# Patient Record
Sex: FEMALE | Race: BLACK OR AFRICAN AMERICAN | Employment: OTHER | URBAN - METROPOLITAN AREA
[De-identification: names, ages, dates, MRNs, and addresses within clinical notes are randomized per-mention and may not be internally consistent; named-entity substitution may affect disease eponyms.]

---

## 2017-09-24 PROBLEM — O21.9 NAUSEA AND VOMITING DURING PREGNANCY: Status: ACTIVE | Noted: 2017-09-24

## 2017-09-24 PROBLEM — Z34.90 PREGNANCY: Status: ACTIVE | Noted: 2017-09-24

## 2017-12-11 PROBLEM — Z3A.39 39 WEEKS GESTATION OF PREGNANCY: Status: ACTIVE | Noted: 2017-12-11

## 2017-12-11 PROBLEM — O47.9 IRREGULAR CONTRACTIONS: Status: ACTIVE | Noted: 2017-12-11

## 2018-12-12 ENCOUNTER — HOSPITAL ENCOUNTER (EMERGENCY)
Age: 38
Discharge: HOME | End: 2018-12-12
Payer: COMMERCIAL

## 2018-12-12 VITALS
OXYGEN SATURATION: 99 % | RESPIRATION RATE: 16 BRPM | DIASTOLIC BLOOD PRESSURE: 79 MMHG | HEIGHT: 68 IN | HEART RATE: 73 BPM | WEIGHT: 180 LBS | BODY MASS INDEX: 27.28 KG/M2 | TEMPERATURE: 97.4 F | SYSTOLIC BLOOD PRESSURE: 103 MMHG

## 2018-12-12 DIAGNOSIS — H10.9 CONJUNCTIVITIS OF RIGHT EYE, UNSPECIFIED CONJUNCTIVITIS TYPE: Primary | ICD-10-CM

## 2018-12-12 PROCEDURE — 99282 EMERGENCY DEPT VISIT SF MDM: CPT

## 2018-12-12 RX ORDER — ERYTHROMYCIN 5 MG/G
OINTMENT OPHTHALMIC
Qty: 1 TUBE | Refills: 0 | Status: SHIPPED | OUTPATIENT
Start: 2018-12-12 | End: 2018-12-22

## 2018-12-12 ASSESSMENT — PAIN SCALES - GENERAL: PAINLEVEL_OUTOF10: 8

## 2018-12-12 ASSESSMENT — PAIN DESCRIPTION - ORIENTATION: ORIENTATION: RIGHT

## 2018-12-12 ASSESSMENT — PAIN DESCRIPTION - LOCATION: LOCATION: EYE

## 2018-12-12 ASSESSMENT — PAIN DESCRIPTION - FREQUENCY: FREQUENCY: CONTINUOUS

## 2018-12-12 ASSESSMENT — PAIN DESCRIPTION - PAIN TYPE: TYPE: ACUTE PAIN

## 2018-12-12 NOTE — ED PROVIDER NOTES
medication. Assessment     1. Conjunctivitis of right eye, unspecified conjunctivitis type      Plan   Discharge to home  Patient condition is good    New Medications     Discharge Medication List as of 12/12/2018 11:41 AM      START taking these medications    Details   erythromycin (ROMYCIN) 5 MG/GM ophthalmic ointment Apply thin layer oint to affected eye 4 times daily, Disp-1 Tube, R-0, Print             Electronically signed by Mark Burroughs PA-C   DD: 12/12/18  **This report was transcribed using voice recognition software. Every effort was made to ensure accuracy; however, inadvertent computerized transcription errors may be present.     END OF ED PROVIDER NOTE          Mark Burroughs PA-C  12/13/18 5523

## 2019-03-12 ENCOUNTER — APPOINTMENT (OUTPATIENT)
Dept: CT IMAGING | Age: 39
End: 2019-03-12
Payer: COMMERCIAL

## 2019-03-12 ENCOUNTER — HOSPITAL ENCOUNTER (EMERGENCY)
Age: 39
Discharge: HOME | End: 2019-03-12
Attending: EMERGENCY MEDICINE
Payer: COMMERCIAL

## 2019-03-12 VITALS
RESPIRATION RATE: 14 BRPM | SYSTOLIC BLOOD PRESSURE: 105 MMHG | HEART RATE: 73 BPM | HEIGHT: 67 IN | DIASTOLIC BLOOD PRESSURE: 61 MMHG | TEMPERATURE: 97.9 F | OXYGEN SATURATION: 98 % | WEIGHT: 180 LBS | BODY MASS INDEX: 28.25 KG/M2

## 2019-03-12 DIAGNOSIS — R11.2 NAUSEA AND VOMITING, INTRACTABILITY OF VOMITING NOT SPECIFIED, UNSPECIFIED VOMITING TYPE: Primary | ICD-10-CM

## 2019-03-12 DIAGNOSIS — R10.9 ABDOMINAL PAIN, UNSPECIFIED ABDOMINAL LOCATION: ICD-10-CM

## 2019-03-12 DIAGNOSIS — N39.0 URINARY TRACT INFECTION WITH HEMATURIA, SITE UNSPECIFIED: ICD-10-CM

## 2019-03-12 DIAGNOSIS — R31.9 URINARY TRACT INFECTION WITH HEMATURIA, SITE UNSPECIFIED: ICD-10-CM

## 2019-03-12 LAB
ALBUMIN SERPL-MCNC: 4.6 G/DL (ref 3.5–5.2)
ALP BLD-CCNC: 70 U/L (ref 35–104)
ALT SERPL-CCNC: 16 U/L (ref 0–32)
ANION GAP SERPL CALCULATED.3IONS-SCNC: 16 MMOL/L (ref 7–16)
AST SERPL-CCNC: 20 U/L (ref 0–31)
BACTERIA: ABNORMAL /HPF
BASOPHILS ABSOLUTE: 0.01 E9/L (ref 0–0.2)
BASOPHILS RELATIVE PERCENT: 0.2 % (ref 0–2)
BILIRUB SERPL-MCNC: 0.6 MG/DL (ref 0–1.2)
BILIRUBIN URINE: NEGATIVE
BLOOD, URINE: ABNORMAL
BUN BLDV-MCNC: 7 MG/DL (ref 6–20)
CALCIUM SERPL-MCNC: 8.9 MG/DL (ref 8.6–10.2)
CHLORIDE BLD-SCNC: 97 MMOL/L (ref 98–107)
CLARITY: CLEAR
CO2: 21 MMOL/L (ref 22–29)
COLOR: YELLOW
CREAT SERPL-MCNC: 0.8 MG/DL (ref 0.5–1)
EOSINOPHILS ABSOLUTE: 0.01 E9/L (ref 0.05–0.5)
EOSINOPHILS RELATIVE PERCENT: 0.2 % (ref 0–6)
EPITHELIAL CELLS, UA: ABNORMAL /HPF
GFR AFRICAN AMERICAN: >60
GFR NON-AFRICAN AMERICAN: >60 ML/MIN/1.73
GLUCOSE BLD-MCNC: 102 MG/DL (ref 74–99)
GLUCOSE URINE: NEGATIVE MG/DL
HCG, URINE, POC: NEGATIVE
HCT VFR BLD CALC: 40.3 % (ref 34–48)
HEMOGLOBIN: 13.3 G/DL (ref 11.5–15.5)
IMMATURE GRANULOCYTES #: 0.01 E9/L
IMMATURE GRANULOCYTES %: 0.2 % (ref 0–5)
KETONES, URINE: 15 MG/DL
LACTIC ACID: 2.1 MMOL/L (ref 0.5–2.2)
LEUKOCYTE ESTERASE, URINE: ABNORMAL
LIPASE: 16 U/L (ref 13–60)
LYMPHOCYTES ABSOLUTE: 1.1 E9/L (ref 1.5–4)
LYMPHOCYTES RELATIVE PERCENT: 25.9 % (ref 20–42)
Lab: NORMAL
MAGNESIUM: 2 MG/DL (ref 1.6–2.6)
MCH RBC QN AUTO: 29.6 PG (ref 26–35)
MCHC RBC AUTO-ENTMCNC: 33 % (ref 32–34.5)
MCV RBC AUTO: 89.6 FL (ref 80–99.9)
MONOCYTES ABSOLUTE: 0.34 E9/L (ref 0.1–0.95)
MONOCYTES RELATIVE PERCENT: 8 % (ref 2–12)
NEGATIVE QC PASS/FAIL: NORMAL
NEUTROPHILS ABSOLUTE: 2.77 E9/L (ref 1.8–7.3)
NEUTROPHILS RELATIVE PERCENT: 65.5 % (ref 43–80)
NITRITE, URINE: NEGATIVE
PDW BLD-RTO: 14 FL (ref 11.5–15)
PH UA: 6 (ref 5–9)
PLATELET # BLD: 325 E9/L (ref 130–450)
PMV BLD AUTO: 8.7 FL (ref 7–12)
POSITIVE QC PASS/FAIL: NORMAL
POTASSIUM REFLEX MAGNESIUM: 3 MMOL/L (ref 3.5–5)
PROTEIN UA: ABNORMAL MG/DL
RBC # BLD: 4.5 E12/L (ref 3.5–5.5)
RBC UA: ABNORMAL /HPF (ref 0–2)
SODIUM BLD-SCNC: 134 MMOL/L (ref 132–146)
SPECIFIC GRAVITY UA: 1.02 (ref 1–1.03)
TOTAL PROTEIN: 8.5 G/DL (ref 6.4–8.3)
UROBILINOGEN, URINE: 0.2 E.U./DL
WBC # BLD: 4.2 E9/L (ref 4.5–11.5)
WBC UA: ABNORMAL /HPF (ref 0–5)

## 2019-03-12 PROCEDURE — 83735 ASSAY OF MAGNESIUM: CPT

## 2019-03-12 PROCEDURE — 83690 ASSAY OF LIPASE: CPT

## 2019-03-12 PROCEDURE — 2580000003 HC RX 258: Performed by: NURSE PRACTITIONER

## 2019-03-12 PROCEDURE — 83605 ASSAY OF LACTIC ACID: CPT

## 2019-03-12 PROCEDURE — 87088 URINE BACTERIA CULTURE: CPT

## 2019-03-12 PROCEDURE — 74177 CT ABD & PELVIS W/CONTRAST: CPT

## 2019-03-12 PROCEDURE — 6370000000 HC RX 637 (ALT 250 FOR IP): Performed by: NURSE PRACTITIONER

## 2019-03-12 PROCEDURE — 96374 THER/PROPH/DIAG INJ IV PUSH: CPT

## 2019-03-12 PROCEDURE — 96375 TX/PRO/DX INJ NEW DRUG ADDON: CPT

## 2019-03-12 PROCEDURE — 80053 COMPREHEN METABOLIC PANEL: CPT

## 2019-03-12 PROCEDURE — 85025 COMPLETE CBC W/AUTO DIFF WBC: CPT

## 2019-03-12 PROCEDURE — 6360000002 HC RX W HCPCS: Performed by: NURSE PRACTITIONER

## 2019-03-12 PROCEDURE — 2580000003 HC RX 258: Performed by: RADIOLOGY

## 2019-03-12 PROCEDURE — 81001 URINALYSIS AUTO W/SCOPE: CPT

## 2019-03-12 PROCEDURE — 99284 EMERGENCY DEPT VISIT MOD MDM: CPT

## 2019-03-12 PROCEDURE — 6360000004 HC RX CONTRAST MEDICATION: Performed by: RADIOLOGY

## 2019-03-12 RX ORDER — ONDANSETRON 4 MG/1
4 TABLET, ORALLY DISINTEGRATING ORAL EVERY 8 HOURS PRN
Qty: 10 TABLET | Refills: 0 | Status: SHIPPED | OUTPATIENT
Start: 2019-03-12 | End: 2020-03-11

## 2019-03-12 RX ORDER — KETOROLAC TROMETHAMINE 30 MG/ML
30 INJECTION, SOLUTION INTRAMUSCULAR; INTRAVENOUS ONCE
Status: COMPLETED | OUTPATIENT
Start: 2019-03-12 | End: 2019-03-12

## 2019-03-12 RX ORDER — SODIUM CHLORIDE 0.9 % (FLUSH) 0.9 %
10 SYRINGE (ML) INJECTION PRN
Status: DISCONTINUED | OUTPATIENT
Start: 2019-03-12 | End: 2019-03-12 | Stop reason: HOSPADM

## 2019-03-12 RX ORDER — ONDANSETRON 2 MG/ML
4 INJECTION INTRAMUSCULAR; INTRAVENOUS ONCE
Status: COMPLETED | OUTPATIENT
Start: 2019-03-12 | End: 2019-03-12

## 2019-03-12 RX ORDER — CEFDINIR 300 MG/1
300 CAPSULE ORAL 2 TIMES DAILY
Qty: 14 CAPSULE | Refills: 0 | Status: SHIPPED | OUTPATIENT
Start: 2019-03-12 | End: 2019-03-19

## 2019-03-12 RX ORDER — POTASSIUM CHLORIDE 20 MEQ/1
40 TABLET, EXTENDED RELEASE ORAL ONCE
Status: COMPLETED | OUTPATIENT
Start: 2019-03-12 | End: 2019-03-12

## 2019-03-12 RX ORDER — METOCLOPRAMIDE HYDROCHLORIDE 5 MG/ML
10 INJECTION INTRAMUSCULAR; INTRAVENOUS ONCE
Status: COMPLETED | OUTPATIENT
Start: 2019-03-12 | End: 2019-03-12

## 2019-03-12 RX ORDER — 0.9 % SODIUM CHLORIDE 0.9 %
30 INTRAVENOUS SOLUTION INTRAVENOUS ONCE
Status: COMPLETED | OUTPATIENT
Start: 2019-03-12 | End: 2019-03-12

## 2019-03-12 RX ORDER — LOPERAMIDE HYDROCHLORIDE 2 MG/1
2 CAPSULE ORAL 4 TIMES DAILY PRN
Qty: 8 CAPSULE | Refills: 0 | Status: SHIPPED | OUTPATIENT
Start: 2019-03-12 | End: 2019-03-14

## 2019-03-12 RX ADMIN — SODIUM CHLORIDE 2448 ML: 9 INJECTION, SOLUTION INTRAVENOUS at 09:55

## 2019-03-12 RX ADMIN — ONDANSETRON 4 MG: 2 INJECTION INTRAMUSCULAR; INTRAVENOUS at 09:55

## 2019-03-12 RX ADMIN — IOPAMIDOL 110 ML: 755 INJECTION, SOLUTION INTRAVENOUS at 11:08

## 2019-03-12 RX ADMIN — KETOROLAC TROMETHAMINE 30 MG: 30 INJECTION, SOLUTION INTRAMUSCULAR; INTRAVENOUS at 09:55

## 2019-03-12 RX ADMIN — METOCLOPRAMIDE 10 MG: 5 INJECTION, SOLUTION INTRAMUSCULAR; INTRAVENOUS at 10:57

## 2019-03-12 RX ADMIN — Medication 10 ML: at 11:05

## 2019-03-12 RX ADMIN — POTASSIUM CHLORIDE 40 MEQ: 20 TABLET, EXTENDED RELEASE ORAL at 10:57

## 2019-03-12 ASSESSMENT — PAIN SCALES - GENERAL
PAINLEVEL_OUTOF10: 9
PAINLEVEL_OUTOF10: 10

## 2019-03-12 ASSESSMENT — PAIN DESCRIPTION - PAIN TYPE
TYPE: ACUTE PAIN
TYPE: ACUTE PAIN

## 2019-03-12 ASSESSMENT — PAIN DESCRIPTION - FREQUENCY
FREQUENCY: CONTINUOUS
FREQUENCY: CONTINUOUS

## 2019-03-12 ASSESSMENT — PAIN DESCRIPTION - DESCRIPTORS
DESCRIPTORS: SHARP
DESCRIPTORS: SHARP

## 2019-03-12 ASSESSMENT — PAIN DESCRIPTION - ORIENTATION: ORIENTATION: MID

## 2019-03-12 ASSESSMENT — PAIN DESCRIPTION - LOCATION
LOCATION: ABDOMEN
LOCATION: ABDOMEN

## 2019-03-13 LAB — URINE CULTURE, ROUTINE: NORMAL

## 2021-04-08 ENCOUNTER — APPOINTMENT (OUTPATIENT)
Dept: CT IMAGING | Age: 41
End: 2021-04-08
Payer: COMMERCIAL

## 2021-04-08 ENCOUNTER — HOSPITAL ENCOUNTER (EMERGENCY)
Age: 41
Discharge: HOME | End: 2021-04-08
Attending: EMERGENCY MEDICINE
Payer: COMMERCIAL

## 2021-04-08 VITALS
SYSTOLIC BLOOD PRESSURE: 95 MMHG | TEMPERATURE: 98 F | HEIGHT: 67 IN | BODY MASS INDEX: 28.25 KG/M2 | HEART RATE: 80 BPM | DIASTOLIC BLOOD PRESSURE: 65 MMHG | RESPIRATION RATE: 17 BRPM | WEIGHT: 180 LBS | OXYGEN SATURATION: 99 %

## 2021-04-08 DIAGNOSIS — R11.2 NON-INTRACTABLE VOMITING WITH NAUSEA, UNSPECIFIED VOMITING TYPE: ICD-10-CM

## 2021-04-08 DIAGNOSIS — N30.01 ACUTE CYSTITIS WITH HEMATURIA: Primary | ICD-10-CM

## 2021-04-08 LAB
ALBUMIN SERPL-MCNC: 4.5 G/DL (ref 3.5–5.2)
ALP BLD-CCNC: 69 U/L (ref 35–104)
ALT SERPL-CCNC: 17 U/L (ref 0–32)
ANION GAP SERPL CALCULATED.3IONS-SCNC: 14 MMOL/L (ref 7–16)
AST SERPL-CCNC: 16 U/L (ref 0–31)
BACTERIA: ABNORMAL /HPF
BILIRUB SERPL-MCNC: 0.7 MG/DL (ref 0–1.2)
BILIRUBIN URINE: NEGATIVE
BLOOD, URINE: ABNORMAL
BUN BLDV-MCNC: 10 MG/DL (ref 6–20)
CALCIUM SERPL-MCNC: 9 MG/DL (ref 8.6–10.2)
CHLORIDE BLD-SCNC: 99 MMOL/L (ref 98–107)
CLARITY: CLEAR
CO2: 21 MMOL/L (ref 22–29)
COLOR: YELLOW
CREAT SERPL-MCNC: 0.7 MG/DL (ref 0.5–1)
EPITHELIAL CELLS, UA: ABNORMAL /HPF
GFR AFRICAN AMERICAN: >60
GFR NON-AFRICAN AMERICAN: >60 ML/MIN/1.73
GLUCOSE BLD-MCNC: 104 MG/DL (ref 74–99)
GLUCOSE URINE: NEGATIVE MG/DL
HCG(URINE) PREGNANCY TEST: NEGATIVE
HCT VFR BLD CALC: 39.7 % (ref 34–48)
HEMOGLOBIN: 12.9 G/DL (ref 11.5–15.5)
KETONES, URINE: NEGATIVE MG/DL
LACTIC ACID, SEPSIS: 1.5 MMOL/L (ref 0.5–1.9)
LEUKOCYTE ESTERASE, URINE: ABNORMAL
LIPASE: 12 U/L (ref 13–60)
MCH RBC QN AUTO: 29.1 PG (ref 26–35)
MCHC RBC AUTO-ENTMCNC: 32.5 % (ref 32–34.5)
MCV RBC AUTO: 89.4 FL (ref 80–99.9)
NITRITE, URINE: NEGATIVE
PDW BLD-RTO: 14.3 FL (ref 11.5–15)
PH UA: 8 (ref 5–9)
PLATELET # BLD: 336 E9/L (ref 130–450)
PMV BLD AUTO: 9.2 FL (ref 7–12)
POTASSIUM SERPL-SCNC: 3.7 MMOL/L (ref 3.5–5)
PROTEIN UA: NEGATIVE MG/DL
RBC # BLD: 4.44 E12/L (ref 3.5–5.5)
RBC UA: ABNORMAL /HPF (ref 0–2)
SARS-COV-2, NAAT: NOT DETECTED
SODIUM BLD-SCNC: 134 MMOL/L (ref 132–146)
SPECIFIC GRAVITY UA: 1.01 (ref 1–1.03)
TOTAL PROTEIN: 8.2 G/DL (ref 6.4–8.3)
TROPONIN: <0.01 NG/ML (ref 0–0.03)
UROBILINOGEN, URINE: 4 E.U./DL
WBC # BLD: 8.1 E9/L (ref 4.5–11.5)
WBC UA: ABNORMAL /HPF (ref 0–5)

## 2021-04-08 PROCEDURE — 96374 THER/PROPH/DIAG INJ IV PUSH: CPT

## 2021-04-08 PROCEDURE — 83605 ASSAY OF LACTIC ACID: CPT

## 2021-04-08 PROCEDURE — 84484 ASSAY OF TROPONIN QUANT: CPT

## 2021-04-08 PROCEDURE — 93005 ELECTROCARDIOGRAM TRACING: CPT | Performed by: NURSE PRACTITIONER

## 2021-04-08 PROCEDURE — 96375 TX/PRO/DX INJ NEW DRUG ADDON: CPT

## 2021-04-08 PROCEDURE — 81001 URINALYSIS AUTO W/SCOPE: CPT

## 2021-04-08 PROCEDURE — 6360000004 HC RX CONTRAST MEDICATION: Performed by: RADIOLOGY

## 2021-04-08 PROCEDURE — 87635 SARS-COV-2 COVID-19 AMP PRB: CPT

## 2021-04-08 PROCEDURE — 96372 THER/PROPH/DIAG INJ SC/IM: CPT

## 2021-04-08 PROCEDURE — 80053 COMPREHEN METABOLIC PANEL: CPT

## 2021-04-08 PROCEDURE — 6360000002 HC RX W HCPCS: Performed by: NURSE PRACTITIONER

## 2021-04-08 PROCEDURE — 99284 EMERGENCY DEPT VISIT MOD MDM: CPT

## 2021-04-08 PROCEDURE — 81025 URINE PREGNANCY TEST: CPT

## 2021-04-08 PROCEDURE — 85027 COMPLETE CBC AUTOMATED: CPT

## 2021-04-08 PROCEDURE — 2580000003 HC RX 258: Performed by: RADIOLOGY

## 2021-04-08 PROCEDURE — 74177 CT ABD & PELVIS W/CONTRAST: CPT

## 2021-04-08 PROCEDURE — 83690 ASSAY OF LIPASE: CPT

## 2021-04-08 PROCEDURE — 2580000003 HC RX 258: Performed by: NURSE PRACTITIONER

## 2021-04-08 PROCEDURE — 6360000002 HC RX W HCPCS: Performed by: EMERGENCY MEDICINE

## 2021-04-08 RX ORDER — PROMETHAZINE HYDROCHLORIDE 25 MG/ML
12.5 INJECTION, SOLUTION INTRAMUSCULAR; INTRAVENOUS ONCE
Status: COMPLETED | OUTPATIENT
Start: 2021-04-08 | End: 2021-04-08

## 2021-04-08 RX ORDER — CEFDINIR 300 MG/1
300 CAPSULE ORAL 2 TIMES DAILY
Qty: 20 CAPSULE | Refills: 0 | Status: SHIPPED | OUTPATIENT
Start: 2021-04-08 | End: 2021-04-18

## 2021-04-08 RX ORDER — METOCLOPRAMIDE HYDROCHLORIDE 5 MG/ML
10 INJECTION INTRAMUSCULAR; INTRAVENOUS ONCE
Status: COMPLETED | OUTPATIENT
Start: 2021-04-08 | End: 2021-04-08

## 2021-04-08 RX ORDER — ONDANSETRON 4 MG/1
8 TABLET, ORALLY DISINTEGRATING ORAL EVERY 8 HOURS PRN
Qty: 12 TABLET | Refills: 0 | Status: SHIPPED | OUTPATIENT
Start: 2021-04-08 | End: 2022-03-30

## 2021-04-08 RX ORDER — 0.9 % SODIUM CHLORIDE 0.9 %
1000 INTRAVENOUS SOLUTION INTRAVENOUS ONCE
Status: COMPLETED | OUTPATIENT
Start: 2021-04-08 | End: 2021-04-08

## 2021-04-08 RX ORDER — SODIUM CHLORIDE 0.9 % (FLUSH) 0.9 %
10 SYRINGE (ML) INJECTION PRN
Status: DISCONTINUED | OUTPATIENT
Start: 2021-04-08 | End: 2021-04-08 | Stop reason: HOSPADM

## 2021-04-08 RX ORDER — DIPHENHYDRAMINE HYDROCHLORIDE 50 MG/ML
12.5 INJECTION INTRAMUSCULAR; INTRAVENOUS ONCE
Status: COMPLETED | OUTPATIENT
Start: 2021-04-08 | End: 2021-04-08

## 2021-04-08 RX ORDER — FENTANYL CITRATE 50 UG/ML
50 INJECTION, SOLUTION INTRAMUSCULAR; INTRAVENOUS ONCE
Status: COMPLETED | OUTPATIENT
Start: 2021-04-08 | End: 2021-04-08

## 2021-04-08 RX ORDER — ONDANSETRON 2 MG/ML
4 INJECTION INTRAMUSCULAR; INTRAVENOUS ONCE
Status: COMPLETED | OUTPATIENT
Start: 2021-04-08 | End: 2021-04-08

## 2021-04-08 RX ADMIN — FENTANYL CITRATE 50 MCG: 50 INJECTION, SOLUTION INTRAMUSCULAR; INTRAVENOUS at 10:04

## 2021-04-08 RX ADMIN — IOPAMIDOL 90 ML: 755 INJECTION, SOLUTION INTRAVENOUS at 12:32

## 2021-04-08 RX ADMIN — Medication 10 ML: at 12:32

## 2021-04-08 RX ADMIN — ONDANSETRON 4 MG: 2 INJECTION INTRAMUSCULAR; INTRAVENOUS at 10:04

## 2021-04-08 RX ADMIN — PROMETHAZINE HYDROCHLORIDE 12.5 MG: 25 INJECTION INTRAMUSCULAR; INTRAVENOUS at 13:40

## 2021-04-08 RX ADMIN — SODIUM CHLORIDE 1000 ML: 9 INJECTION, SOLUTION INTRAVENOUS at 14:06

## 2021-04-08 RX ADMIN — DIPHENHYDRAMINE HYDROCHLORIDE 12.5 MG: 50 INJECTION, SOLUTION INTRAMUSCULAR; INTRAVENOUS at 11:05

## 2021-04-08 RX ADMIN — SODIUM CHLORIDE 1000 ML: 9 INJECTION, SOLUTION INTRAVENOUS at 10:04

## 2021-04-08 RX ADMIN — METOCLOPRAMIDE HYDROCHLORIDE 10 MG: 5 INJECTION INTRAMUSCULAR; INTRAVENOUS at 11:05

## 2021-04-08 ASSESSMENT — PAIN DESCRIPTION - FREQUENCY
FREQUENCY: CONTINUOUS
FREQUENCY: CONTINUOUS

## 2021-04-08 ASSESSMENT — PAIN DESCRIPTION - DESCRIPTORS: DESCRIPTORS: CONSTANT;STABBING

## 2021-04-08 ASSESSMENT — PAIN DESCRIPTION - PAIN TYPE
TYPE: ACUTE PAIN
TYPE: ACUTE PAIN

## 2021-04-08 ASSESSMENT — PAIN DESCRIPTION - LOCATION
LOCATION: ABDOMEN
LOCATION: ABDOMEN

## 2021-04-08 NOTE — ED PROVIDER NOTES
Total Protein 8.2 6.4 - 8.3 g/dL    Albumin 4.5 3.5 - 5.2 g/dL    Total Bilirubin 0.7 0.0 - 1.2 mg/dL    Alkaline Phosphatase 69 35 - 104 U/L    ALT 17 0 - 32 U/L    AST 16 0 - 31 U/L   Troponin   Result Value Ref Range    Troponin <0.01 0.00 - 0.03 ng/mL   Lactate, Sepsis   Result Value Ref Range    Lactic Acid, Sepsis 1.5 0.5 - 1.9 mmol/L   Lipase   Result Value Ref Range    Lipase 12 (L) 13 - 60 U/L   Urinalysis with Microscopic   Result Value Ref Range    Color, UA Yellow Straw/Yellow    Clarity, UA Clear Clear    Glucose, Ur Negative Negative mg/dL    Bilirubin Urine Negative Negative    Ketones, Urine Negative Negative mg/dL    Specific Gravity, UA 1.010 1.005 - 1.030    Blood, Urine TRACE-INTACT Negative    pH, UA 8.0 5.0 - 9.0    Protein, UA Negative Negative mg/dL    Urobilinogen, Urine 4.0 (A) <2.0 E.U./dL    Nitrite, Urine Negative Negative    Leukocyte Esterase, Urine SMALL (A) Negative    WBC, UA 5-10 (A) 0 - 5 /HPF    RBC, UA 1-3 0 - 2 /HPF    Epithelial Cells, UA RARE /HPF    Bacteria, UA RARE (A) None Seen /HPF   Pregnancy, urine   Result Value Ref Range    HCG(Urine) Pregnancy Test NEGATIVE NEGATIVE   EKG 12 Lead   Result Value Ref Range    Ventricular Rate 90 BPM    Atrial Rate 90 BPM    P-R Interval 136 ms    QRS Duration 82 ms    Q-T Interval 380 ms    QTc Calculation (Bazett) 464 ms    P Axis 58 degrees    R Axis 28 degrees    T Axis 34 degrees   EKG #1:  Interpreted by emergency department physician unless otherwise noted. Time:  1431    Rate: 90 bpm  Rhythm: Sinus rhythm. Interpretation: Normal EKG, normal sinus rhythm. Comparison: There are no previous tracings available for comparison interpreted by me. Imaging: All Radiology results interpreted by Radiologist unless otherwise noted. CT ABDOMEN PELVIS W IV CONTRAST Additional Contrast? None   Final Result   Non-specific slight prominence of fluid throughout multiple small bowel loops   may reflect mild enteritis.       Slight diastasis recti at the umbilicus, unchanged           ED Course / Medical Decision Making     Medications   sodium chloride flush 0.9 % injection 10 mL (10 mLs Intravenous Given 4/8/21 1232)   0.9 % sodium chloride bolus (0 mLs Intravenous Stopped 4/8/21 1104)   ondansetron (ZOFRAN) injection 4 mg (4 mg Intravenous Given 4/8/21 1004)   fentaNYL (SUBLIMAZE) injection 50 mcg (50 mcg Intravenous Given 4/8/21 1004)   metoclopramide (REGLAN) injection 10 mg (10 mg Intravenous Given 4/8/21 1105)   diphenhydrAMINE (BENADRYL) injection 12.5 mg (12.5 mg Intravenous Given 4/8/21 1105)   iopamidol (ISOVUE-370) 76 % injection 90 mL (90 mLs Intravenous Given 4/8/21 1232)   promethazine (PHENERGAN) injection 12.5 mg (12.5 mg Intramuscular Given 4/8/21 1340)   0.9 % sodium chloride bolus (0 mLs Intravenous Stopped 4/8/21 1506)        Re-examination:  4/8/21       Time:   1100 Patients condition unchanged, still vomiting.   1300 Patient still complains of nausea. 1500 Patient reports relief of nausea tolerating p.o. fluids. Consultations:             None    Procedure(s):   none    MDM:   Patient is well-appearing, afebrile. Presents with diffuse abdominal pain as well as nausea vomiting. Labs obtained, reviewed, reassuring with the option of UA positive for leukocytes 5-10 WBCs. CT scan of the abdomen pelvis was obtained indicative of enteritis. Patient was given IV fluids and multiple antiemetics upon reevaluation abdominal pain resolved and patient was able to tolerate p.o. fluids. Plan will be discharged home with antibiotics for cystitis Zofran as needed for nausea. She was advised to follow-up with PCP for reevaluation as well as return precautions. Plan of Care/Counseling:  Myself reviewed today's visit with the patient in addition to providing specific details for the plan of care and counseling regarding the diagnosis and prognosis. Questions are answered at this time and are agreeable with the plan.     Assessment 1. Acute cystitis with hematuria    2. Non-intractable vomiting with nausea, unspecified vomiting type      Plan   Discharge home. Patient condition is good    New Medications     Discharge Medication List as of 4/8/2021  3:16 PM      START taking these medications    Details   ondansetron (ZOFRAN ODT) 4 MG disintegrating tablet Place 2 tablets under the tongue every 8 hours as needed for Nausea, Disp-12 tablet, R-0Print      cefdinir (OMNICEF) 300 MG capsule Take 1 capsule by mouth 2 times daily for 10 days, Disp-20 capsule, R-0Print           Electronically signed by CARYL Patel CNP   DD: 4/8/21  **This report was transcribed using voice recognition software. Every effort was made to ensure accuracy; however, inadvertent computerized transcription errors may be present.   END OF ED PROVIDER NOTE      CARYL Lezama CNP  04/09/21 7026

## 2021-04-08 NOTE — ED TRIAGE NOTES
Radiology Procedure Waiver   Name: Jamison Pride  : 1980  MRN: [id number]    Date:  21    Time: 10:00 AM EDT    Benefits of immediately proceeding with Radiology exam(s) without pre-testing outweigh the risks or are not indicated as specified below and therefore the following is/are being waived:    [x] Pregnancy test   [] Patients LMP on-time and regular. [x] Patient had Tubal Ligation or has other Contraception Device. [] Patient  is Menopausal or Premenarcheal.    [] Patient had Full or Partial Hysterectomy. [] Protocol for Iodine allergy    [] MRI Questionnaire     [] BUN/Creatinine   [] Patient age w/no hx of renal dysfunction. [] Patient on Dialysis. [] Recent Normal Labs.   Electronically signed by CARYL Naik CNP on 21 at 10:00 AM EDT

## 2021-04-09 LAB
EKG ATRIAL RATE: 90 BPM
EKG P AXIS: 58 DEGREES
EKG P-R INTERVAL: 136 MS
EKG Q-T INTERVAL: 380 MS
EKG QRS DURATION: 82 MS
EKG QTC CALCULATION (BAZETT): 464 MS
EKG R AXIS: 28 DEGREES
EKG T AXIS: 34 DEGREES
EKG VENTRICULAR RATE: 90 BPM

## 2022-03-30 ENCOUNTER — HOSPITAL ENCOUNTER (EMERGENCY)
Age: 42
Discharge: HOME | End: 2022-03-31
Attending: EMERGENCY MEDICINE
Payer: MEDICARE

## 2022-03-30 ENCOUNTER — APPOINTMENT (OUTPATIENT)
Dept: ULTRASOUND IMAGING | Age: 42
End: 2022-03-30
Payer: MEDICARE

## 2022-03-30 ENCOUNTER — APPOINTMENT (OUTPATIENT)
Dept: CT IMAGING | Age: 42
End: 2022-03-30
Payer: MEDICARE

## 2022-03-30 ENCOUNTER — APPOINTMENT (OUTPATIENT)
Dept: GENERAL RADIOLOGY | Age: 42
End: 2022-03-30
Payer: MEDICARE

## 2022-03-30 DIAGNOSIS — A59.03 TRICHOMONAL CYSTITIS: ICD-10-CM

## 2022-03-30 DIAGNOSIS — N30.00 ACUTE CYSTITIS WITHOUT HEMATURIA: ICD-10-CM

## 2022-03-30 DIAGNOSIS — M77.9 ENTHESOPATHY: Primary | ICD-10-CM

## 2022-03-30 LAB
ALBUMIN SERPL-MCNC: 4.4 G/DL (ref 3.5–5.2)
ALP BLD-CCNC: 59 U/L (ref 35–104)
ALT SERPL-CCNC: 26 U/L (ref 0–32)
ANION GAP SERPL CALCULATED.3IONS-SCNC: 12 MMOL/L (ref 7–16)
AST SERPL-CCNC: 28 U/L (ref 0–31)
BACTERIA: ABNORMAL /HPF
BASOPHILS ABSOLUTE: 0.02 E9/L (ref 0–0.2)
BASOPHILS RELATIVE PERCENT: 0.4 % (ref 0–2)
BILIRUB SERPL-MCNC: 0.4 MG/DL (ref 0–1.2)
BILIRUBIN URINE: NEGATIVE
BLOOD, URINE: ABNORMAL
BUN BLDV-MCNC: 8 MG/DL (ref 6–20)
CALCIUM SERPL-MCNC: 9.6 MG/DL (ref 8.6–10.2)
CHLORIDE BLD-SCNC: 102 MMOL/L (ref 98–107)
CLARITY: ABNORMAL
CO2: 23 MMOL/L (ref 22–29)
COLOR: YELLOW
CREAT SERPL-MCNC: 0.7 MG/DL (ref 0.5–1)
EOSINOPHILS ABSOLUTE: 0.08 E9/L (ref 0.05–0.5)
EOSINOPHILS RELATIVE PERCENT: 1.5 % (ref 0–6)
EPITHELIAL CELLS, UA: ABNORMAL /HPF
GFR AFRICAN AMERICAN: >60
GFR NON-AFRICAN AMERICAN: >60 ML/MIN/1.73
GLUCOSE BLD-MCNC: 100 MG/DL (ref 74–99)
GLUCOSE URINE: NEGATIVE MG/DL
HCG, URINE, POC: NEGATIVE
HCT VFR BLD CALC: 37.4 % (ref 34–48)
HEMOGLOBIN: 12.2 G/DL (ref 11.5–15.5)
IMMATURE GRANULOCYTES #: 0.01 E9/L
IMMATURE GRANULOCYTES %: 0.2 % (ref 0–5)
INFLUENZA A BY PCR: NOT DETECTED
INFLUENZA B BY PCR: NOT DETECTED
KETONES, URINE: NEGATIVE MG/DL
LEUKOCYTE ESTERASE, URINE: ABNORMAL
LIPASE: 30 U/L (ref 13–60)
LYMPHOCYTES ABSOLUTE: 2.63 E9/L (ref 1.5–4)
LYMPHOCYTES RELATIVE PERCENT: 50.7 % (ref 20–42)
Lab: NORMAL
MCH RBC QN AUTO: 29.3 PG (ref 26–35)
MCHC RBC AUTO-ENTMCNC: 32.6 % (ref 32–34.5)
MCV RBC AUTO: 89.7 FL (ref 80–99.9)
MONOCYTES ABSOLUTE: 0.48 E9/L (ref 0.1–0.95)
MONOCYTES RELATIVE PERCENT: 9.2 % (ref 2–12)
MUCUS: PRESENT /LPF
NEGATIVE QC PASS/FAIL: NORMAL
NEUTROPHILS ABSOLUTE: 1.97 E9/L (ref 1.8–7.3)
NEUTROPHILS RELATIVE PERCENT: 38 % (ref 43–80)
NITRITE, URINE: NEGATIVE
PDW BLD-RTO: 14.2 FL (ref 11.5–15)
PH UA: 5.5 (ref 5–9)
PLATELET # BLD: 389 E9/L (ref 130–450)
PMV BLD AUTO: 9.1 FL (ref 7–12)
POSITIVE QC PASS/FAIL: NORMAL
POTASSIUM REFLEX MAGNESIUM: 3.9 MMOL/L (ref 3.5–5)
PROTEIN UA: NEGATIVE MG/DL
RBC # BLD: 4.17 E12/L (ref 3.5–5.5)
RBC UA: ABNORMAL /HPF (ref 0–2)
SARS-COV-2, NAAT: NOT DETECTED
SODIUM BLD-SCNC: 137 MMOL/L (ref 132–146)
SPECIFIC GRAVITY UA: >=1.03 (ref 1–1.03)
TOTAL PROTEIN: 7.8 G/DL (ref 6.4–8.3)
TRICHOMONAS: PRESENT /HPF
TROPONIN, HIGH SENSITIVITY: <6 NG/L (ref 0–9)
UROBILINOGEN, URINE: 4 E.U./DL
WBC # BLD: 5.2 E9/L (ref 4.5–11.5)
WBC UA: ABNORMAL /HPF (ref 0–5)

## 2022-03-30 PROCEDURE — 73630 X-RAY EXAM OF FOOT: CPT

## 2022-03-30 PROCEDURE — 84484 ASSAY OF TROPONIN QUANT: CPT

## 2022-03-30 PROCEDURE — 71045 X-RAY EXAM CHEST 1 VIEW: CPT

## 2022-03-30 PROCEDURE — 87635 SARS-COV-2 COVID-19 AMP PRB: CPT

## 2022-03-30 PROCEDURE — 83690 ASSAY OF LIPASE: CPT

## 2022-03-30 PROCEDURE — 6360000002 HC RX W HCPCS: Performed by: EMERGENCY MEDICINE

## 2022-03-30 PROCEDURE — 87502 INFLUENZA DNA AMP PROBE: CPT

## 2022-03-30 PROCEDURE — 85025 COMPLETE CBC W/AUTO DIFF WBC: CPT

## 2022-03-30 PROCEDURE — 96375 TX/PRO/DX INJ NEW DRUG ADDON: CPT

## 2022-03-30 PROCEDURE — 81001 URINALYSIS AUTO W/SCOPE: CPT

## 2022-03-30 PROCEDURE — 99284 EMERGENCY DEPT VISIT MOD MDM: CPT

## 2022-03-30 PROCEDURE — 93005 ELECTROCARDIOGRAM TRACING: CPT | Performed by: PHYSICIAN ASSISTANT

## 2022-03-30 PROCEDURE — 96374 THER/PROPH/DIAG INJ IV PUSH: CPT

## 2022-03-30 PROCEDURE — 80053 COMPREHEN METABOLIC PANEL: CPT

## 2022-03-30 PROCEDURE — 93970 EXTREMITY STUDY: CPT

## 2022-03-30 PROCEDURE — 6360000004 HC RX CONTRAST MEDICATION: Performed by: RADIOLOGY

## 2022-03-30 PROCEDURE — 74177 CT ABD & PELVIS W/CONTRAST: CPT

## 2022-03-30 PROCEDURE — 2580000003 HC RX 258: Performed by: EMERGENCY MEDICINE

## 2022-03-30 RX ORDER — CEFDINIR 300 MG/1
300 CAPSULE ORAL ONCE
Status: COMPLETED | OUTPATIENT
Start: 2022-03-31 | End: 2022-03-31

## 2022-03-30 RX ORDER — MORPHINE SULFATE 4 MG/ML
4 INJECTION, SOLUTION INTRAMUSCULAR; INTRAVENOUS ONCE
Status: COMPLETED | OUTPATIENT
Start: 2022-03-30 | End: 2022-03-30

## 2022-03-30 RX ORDER — NAPROXEN 500 MG/1
500 TABLET ORAL 2 TIMES DAILY
Qty: 10 TABLET | Refills: 0 | Status: ON HOLD | OUTPATIENT
Start: 2022-03-30 | End: 2022-04-28 | Stop reason: HOSPADM

## 2022-03-30 RX ORDER — METRONIDAZOLE 500 MG/1
500 TABLET ORAL 2 TIMES DAILY
Qty: 14 TABLET | Refills: 0 | Status: SHIPPED | OUTPATIENT
Start: 2022-03-30 | End: 2022-04-06

## 2022-03-30 RX ORDER — ONDANSETRON 8 MG/1
8 TABLET, ORALLY DISINTEGRATING ORAL EVERY 8 HOURS PRN
Qty: 12 TABLET | Refills: 0 | Status: ON HOLD | OUTPATIENT
Start: 2022-03-30 | End: 2022-04-28 | Stop reason: SDUPTHER

## 2022-03-30 RX ORDER — 0.9 % SODIUM CHLORIDE 0.9 %
1000 INTRAVENOUS SOLUTION INTRAVENOUS ONCE
Status: COMPLETED | OUTPATIENT
Start: 2022-03-30 | End: 2022-03-31

## 2022-03-30 RX ORDER — METRONIDAZOLE 500 MG/1
500 TABLET ORAL ONCE
Status: COMPLETED | OUTPATIENT
Start: 2022-03-31 | End: 2022-03-31

## 2022-03-30 RX ORDER — ONDANSETRON 2 MG/ML
4 INJECTION INTRAMUSCULAR; INTRAVENOUS ONCE
Status: COMPLETED | OUTPATIENT
Start: 2022-03-30 | End: 2022-03-30

## 2022-03-30 RX ORDER — CEFDINIR 300 MG/1
300 CAPSULE ORAL 2 TIMES DAILY
Qty: 14 CAPSULE | Refills: 0 | Status: SHIPPED | OUTPATIENT
Start: 2022-03-30 | End: 2022-04-06

## 2022-03-30 RX ADMIN — MORPHINE SULFATE 4 MG: 4 INJECTION, SOLUTION INTRAMUSCULAR; INTRAVENOUS at 22:56

## 2022-03-30 RX ADMIN — SODIUM CHLORIDE 1000 ML: 9 INJECTION, SOLUTION INTRAVENOUS at 22:55

## 2022-03-30 RX ADMIN — IOPAMIDOL 75 ML: 755 INJECTION, SOLUTION INTRAVENOUS at 22:47

## 2022-03-30 RX ADMIN — ONDANSETRON 4 MG: 2 INJECTION INTRAMUSCULAR; INTRAVENOUS at 22:56

## 2022-03-30 ASSESSMENT — PAIN SCALES - GENERAL: PAINLEVEL_OUTOF10: 8

## 2022-03-30 NOTE — ED TRIAGE NOTES
FIRST PROVIDER CONTACT ASSESSMENT NOTE      Department of Emergency Medicine   Admit Date: No admission date for patient encounter. Chief Complaint: Emesis (x 4 weeks, hot flashes ) and Leg Pain (bilateral)      History of Present Illness: Kentrell North is a 39 y.o. female who presents to the ED for   4 weeks of: Stabbing chest pains off and on  Stabbing abdominal pain off and on  Nausea and vomiting off and on    Patient also complains of aching pains to both of her legs that have been going on for over a year. Patient states she does not have a doctor and has never been evaluated for any of these complaints. She is not on any daily medication.   Did try Tylenol couple times for the leg aches.        -----------------END OF FIRST PROVIDER CONTACT ASSESSMENT NOTE--------------  Electronically signed by ALEJANDRO Morgan   DD: 3/30/22

## 2022-03-31 VITALS
HEIGHT: 71 IN | BODY MASS INDEX: 26.6 KG/M2 | OXYGEN SATURATION: 97 % | WEIGHT: 190 LBS | DIASTOLIC BLOOD PRESSURE: 70 MMHG | SYSTOLIC BLOOD PRESSURE: 132 MMHG | RESPIRATION RATE: 18 BRPM | TEMPERATURE: 97 F | HEART RATE: 36 BPM

## 2022-03-31 LAB
EKG ATRIAL RATE: 90 BPM
EKG P AXIS: 72 DEGREES
EKG P-R INTERVAL: 134 MS
EKG Q-T INTERVAL: 420 MS
EKG QRS DURATION: 82 MS
EKG QTC CALCULATION (BAZETT): 513 MS
EKG R AXIS: 54 DEGREES
EKG T AXIS: 42 DEGREES
EKG VENTRICULAR RATE: 90 BPM

## 2022-03-31 PROCEDURE — 6370000000 HC RX 637 (ALT 250 FOR IP): Performed by: EMERGENCY MEDICINE

## 2022-03-31 PROCEDURE — 93010 ELECTROCARDIOGRAM REPORT: CPT | Performed by: INTERNAL MEDICINE

## 2022-03-31 RX ADMIN — CEFDINIR 300 MG: 300 CAPSULE ORAL at 00:23

## 2022-03-31 RX ADMIN — METRONIDAZOLE 500 MG: 500 TABLET ORAL at 00:23

## 2022-03-31 NOTE — ED PROVIDER NOTES
HPI:  3/30/22,   Time: 9:03 PM EDT       Kentrell North is a 39 y.o. female presenting to the ED for abdominal pain nausea and leg pain, beginning 4 weeks ago. The complaint has been intermittent, moderate in severity, and worsened by nothing. The patient states that she has been having 2 issues over the last several weeks and months. The patient states that over the last 3 months she has been having bilateral lower extremity pain. She states that she will be walking and gets pains that start in her feet and come up into her legs up into her knees. She states that it happens randomly and not at all the times that she is walking. She states that in starting 4 weeks ago she began having nausea as well as hot flashes. She states that she intermittently gets nauseous and then will get really sweaty. She denies any chest pain or shortness of breath. No palpitations. No numbness tingling. No headaches or vision changes. No focal deficits. No fevers. She states she has had one episode of vomiting that was nonbloody and nonbilious. No diarrhea. Normal bowel movements. No urinary symptoms. No back pain. Review of Systems:   Pertinent positives and negatives are stated within HPI, all other systems reviewed and are negative.          --------------------------------------------- PAST HISTORY ---------------------------------------------  Past Medical History:  has a past medical history of Anxiety and depression, Breast disorder, Cancer ([facility] [address] [facility] Depression, Eclampsia, Herpes simplex virus (HSV) infection, Hypothyroid, Mental disorder, Seizures ([facility] and Thyroid disease. Past Surgical History:  has a past surgical history that includes Leg Surgery (Right); Inner ear surgery (Left); Cervix surgery; Breast lumpectomy (Bilateral); and Dilation and curettage of uterus. Social History:  reports that she has quit smoking.  She has never used smokeless tobacco. She reports current alcohol use. She reports that she does not use drugs. Family History: family history is not on file. The patients home medications have been reviewed. Allergies: Patient has no known allergies. ---------------------------------------------------PHYSICAL EXAM--------------------------------------    Constitutional/General: Alert and oriented x3, well appearing, non toxic in NAD  Head: Normocephalic and atraumatic  Eyes: PERRL, EOMI, conjunctive normal, sclera non icteric  Mouth: Oropharynx clear, handling secretions, no trismus, no asymmetry of the posterior oropharynx or uvular edema  Neck: Supple, full ROM, non tender to palpation in the midline, no stridor, no crepitus, no meningeal signs  Respiratory: Lungs clear to auscultation bilaterally, no wheezes, rales, or rhonchi. Not in respiratory distress  Cardiovascular:  Regular rate. Regular rhythm. No murmurs, gallops, or rubs. 2+ distal pulses  GI:  Abdomen Soft, mild tenderness palpation diffusely, Non distended. +BS. No organomegaly, no palpable masses,  No rebound, guarding, or rigidity. Musculoskeletal: Moves all extremities x 4. Warm and well perfused, no clubbing, cyanosis, or edema. Capillary refill <3 seconds. 2+ pulses to bilateral dorsalis pedis and posterior tibialis. No midline thoracic or lumbar tenderness. Paraspinal muscle tenderness in the lower lumbar. Integument: skin warm and dry. No rashes. Neurologic: GCS 15, no focal deficits, symmetric strength 5/5 in the upper and lower extremities bilaterally, sensation intact to light touch bilateral lower extremities and upper extremities and equal in nature. No facial droop. No dysarthria or aphasia. Psychiatric: Normal Affect    -------------------------------------------------- RESULTS -------------------------------------------------  I have personally reviewed all laboratory and imaging results for this patient. Results are listed below.      LABS:  Results for orders placed or performed during the hospital encounter of 03/30/22   COVID-19, Rapid    Specimen: Nasopharyngeal Swab   Result Value Ref Range    SARS-CoV-2, NAAT Not Detected Not Detected   Rapid influenza A/B antigens    Specimen: Nares   Result Value Ref Range    Influenza A by PCR Not Detected Not Detected    Influenza B by PCR Not Detected Not Detected   CBC with Auto Differential   Result Value Ref Range    WBC 5.2 4.5 - 11.5 E9/L    RBC 4.17 3.50 - 5.50 E12/L    Hemoglobin 12.2 11.5 - 15.5 g/dL    Hematocrit 37.4 34.0 - 48.0 %    MCV 89.7 80.0 - 99.9 fL    MCH 29.3 26.0 - 35.0 pg    MCHC 32.6 32.0 - 34.5 %    RDW 14.2 11.5 - 15.0 fL    Platelets 259 174 - 126 E9/L    MPV 9.1 7.0 - 12.0 fL    Neutrophils % 38.0 (L) 43.0 - 80.0 %    Immature Granulocytes % 0.2 0.0 - 5.0 %    Lymphocytes % 50.7 (H) 20.0 - 42.0 %    Monocytes % 9.2 2.0 - 12.0 %    Eosinophils % 1.5 0.0 - 6.0 %    Basophils % 0.4 0.0 - 2.0 %    Neutrophils Absolute 1.97 1.80 - 7.30 E9/L    Immature Granulocytes # 0.01 E9/L    Lymphocytes Absolute 2.63 1.50 - 4.00 E9/L    Monocytes Absolute 0.48 0.10 - 0.95 E9/L    Eosinophils Absolute 0.08 0.05 - 0.50 E9/L    Basophils Absolute 0.02 0.00 - 0.20 E9/L   Comprehensive Metabolic Panel w/ Reflex to MG   Result Value Ref Range    Sodium 137 132 - 146 mmol/L    Potassium reflex Magnesium 3.9 3.5 - 5.0 mmol/L    Chloride 102 98 - 107 mmol/L    CO2 23 22 - 29 mmol/L    Anion Gap 12 7 - 16 mmol/L    Glucose 100 (H) 74 - 99 mg/dL    BUN 8 6 - 20 mg/dL    CREATININE 0.7 0.5 - 1.0 mg/dL    GFR Non-African American >60 >=60 mL/min/1.73    GFR African American >60     Calcium 9.6 8.6 - 10.2 mg/dL    Total Protein 7.8 6.4 - 8.3 g/dL    Albumin 4.4 3.5 - 5.2 g/dL    Total Bilirubin 0.4 0.0 - 1.2 mg/dL    Alkaline Phosphatase 59 35 - 104 U/L    ALT 26 0 - 32 U/L    AST 28 0 - 31 U/L   Lipase   Result Value Ref Range    Lipase 30 13 - 60 U/L   Troponin   Result Value Ref Range    Troponin, High Sensitivity <6 0 - 9 ng/L Urinalysis with Microscopic   Result Value Ref Range    Color, UA Yellow Straw/Yellow    Clarity, UA CLOUDY (A) Clear    Glucose, Ur Negative Negative mg/dL    Bilirubin Urine Negative Negative    Ketones, Urine Negative Negative mg/dL    Specific Gravity, UA >=1.030 1.005 - 1.030    Blood, Urine TRACE-INTACT Negative    pH, UA 5.5 5.0 - 9.0    Protein, UA Negative Negative mg/dL    Urobilinogen, Urine 4.0 (A) <2.0 E.U./dL    Nitrite, Urine Negative Negative    Leukocyte Esterase, Urine SMALL (A) Negative    Mucus, UA Present (A) None Seen /LPF    WBC, UA 5-10 (A) 0 - 5 /HPF    RBC, UA 0-1 0 - 2 /HPF    Epithelial Cells, UA MANY /HPF    Bacteria, UA MODERATE (A) None Seen /HPF    Trichomonas, UA Present (A) None Seen /HPF   POC Pregnancy Urine   Result Value Ref Range    HCG, Urine, POC Negative Negative    Lot Number [id number]     Positive QC Pass/Fail Pass     Negative QC Pass/Fail Pass    EKG 12 Lead   Result Value Ref Range    Ventricular Rate 90 BPM    Atrial Rate 90 BPM    P-R Interval 134 ms    QRS Duration 82 ms    Q-T Interval 420 ms    QTc Calculation (Bazett) 513 ms    P Axis 72 degrees    R Axis 54 degrees    T Axis 42 degrees       RADIOLOGY:  Interpreted by Radiologist.  CT ABDOMEN PELVIS W IV CONTRAST Additional Contrast? None   Final Result   No acute abnormality is seen in the abdomen or the pelvis. RECOMMENDATIONS:   Unavailable         US DUP LOWER EXTREMITIES BILATERAL VENOUS   Final Result   No evidence of DVT in either lower extremity. XR FOOT LEFT (MIN 3 VIEWS)   Final Result   1. Enthesophytes off the medial margins of the distal phalanx of the   bilateral large toes. Left greater than right calcaneal enthesophytes. 2.  Minimal left large toe osteoarthritis.       RECOMMENDATION:   In the setting of trauma, if there is persistent symptoms and physical exam   warrants a repeat radiograph in 10-14 days could be considered as occult   fractures may not be evident on initial imaging evaluation. XR FOOT RIGHT (MIN 3 VIEWS)   Final Result   1. Enthesophytes off the medial margins of the distal phalanx of the   bilateral large toes. Left greater than right calcaneal enthesophytes. 2.  Minimal left large toe osteoarthritis. RECOMMENDATION:   In the setting of trauma, if there is persistent symptoms and physical exam   warrants a repeat radiograph in 10-14 days could be considered as occult   fractures may not be evident on initial imaging evaluation. XR CHEST PORTABLE   Final Result   No acute process. EKG:  This EKG is signed and interpreted by the EP. KG shows normal sinus rhythm at 90 bpm.  Prolonged QT. Nonspecific ST-T wave changes. No STEMI.    ------------------------- NURSING NOTES AND VITALS REVIEWED ---------------------------   The nursing notes within the ED encounter and vital signs as below have been reviewed by myself. /76   Pulse 98   Temp 97 °F (36.1 °C) (Temporal)   Resp 14   Ht 5' 11\" (1.803 m)   Wt 190 lb (86.2 kg)   SpO2 99%   BMI 26.50 kg/m²   Oxygen Saturation Interpretation: Normal    The patients available past medical records and past encounters were reviewed. ------------------------------ ED COURSE/MEDICAL DECISION MAKING----------------------  Medications   0.9 % sodium chloride bolus (1,000 mLs IntraVENous New Bag 3/30/22 2255)   cefdinir (OMNICEF) capsule 300 mg (has no administration in time range)   metroNIDAZOLE (FLAGYL) tablet 500 mg (has no administration in time range)   ondansetron (ZOFRAN) injection 4 mg (4 mg IntraVENous Given 3/30/22 2256)   morphine sulfate (PF) injection 4 mg (4 mg IntraVENous Given 3/30/22 2256)   iopamidol (ISOVUE-370) 76 % injection 75 mL (75 mLs IntraVENous Given 3/30/22 2247)         ED COURSE:       Medical Decision Making: This is a 19-year-old female presented to the ED for nausea, chills and bilateral leg pain.   Patient underwent laboratory work-up which showed a normal CBC. Normal chemistry. Negative troponin. EKG showed nonspecific findings. Covid test and influenza test negative. Urinalysis consistent with a UTI as well as trichomonas. Patient received antibiotics here in the emergency department. Ultrasound lower extremities was negative. Osteophytes noted on foot x-rays. CT abdomen pelvis negative. Patient will be treated with antibiotics. She will be treated conservatively for her bone spurs. Strict return precautions given. Patient agrees to plan. I, Dr. Kojo Noguera, am the primary provider for this encounter    This patient's ED course included: a personal history and physicial examination, re-evaluation prior to disposition, multiple bedside re-evaluations, IV medications, cardiac monitoring and continuous pulse oximetry    This patient has remained hemodynamically stable during their ED course. Re-Evaluations:             Re-evaluation. Patients symptoms are improving      Counseling: The emergency provider has spoken with the patient and discussed todays results, in addition to providing specific details for the plan of care and counseling regarding the diagnosis and prognosis. Questions are answered at this time and they are agreeable with the plan.       --------------------------------- IMPRESSION AND DISPOSITION ---------------------------------    IMPRESSION  1. Enthesopathy    2. Trichomonal cystitis    3. Acute cystitis without hematuria        DISPOSITION  Disposition: Discharge to home  Patient condition is stable    NOTE: This report was transcribed using voice recognition software.  Every effort was made to ensure accuracy; however, inadvertent computerized transcription errors may be present        Tani Solis DO  03/30/22 0146

## 2022-04-26 ENCOUNTER — APPOINTMENT (OUTPATIENT)
Dept: GENERAL RADIOLOGY | Age: 42
End: 2022-04-26
Payer: MEDICARE

## 2022-04-26 ENCOUNTER — HOSPITAL ENCOUNTER (OUTPATIENT)
Age: 42
Setting detail: OBSERVATION
Discharge: HOME | End: 2022-04-29
Attending: EMERGENCY MEDICINE | Admitting: INTERNAL MEDICINE
Payer: MEDICARE

## 2022-04-26 DIAGNOSIS — M25.562 ACUTE PAIN OF LEFT KNEE: Primary | ICD-10-CM

## 2022-04-26 DIAGNOSIS — M19.90 INFLAMMATORY ARTHRITIS: ICD-10-CM

## 2022-04-26 DIAGNOSIS — M25.462 EFFUSION OF LEFT KNEE JOINT: ICD-10-CM

## 2022-04-26 LAB
ALBUMIN SERPL-MCNC: 4.1 G/DL (ref 3.5–5.2)
ALP BLD-CCNC: 59 U/L (ref 35–104)
ALT SERPL-CCNC: 20 U/L (ref 0–32)
AMPHETAMINE SCREEN, URINE: NOT DETECTED
ANION GAP SERPL CALCULATED.3IONS-SCNC: 10 MMOL/L (ref 7–16)
APPEARANCE FLUID: NORMAL
AST SERPL-CCNC: 26 U/L (ref 0–31)
BACTERIA: NORMAL /HPF
BARBITURATE SCREEN URINE: NOT DETECTED
BASOPHILS ABSOLUTE: 0.02 E9/L (ref 0–0.2)
BASOPHILS RELATIVE PERCENT: 0.5 % (ref 0–2)
BENZODIAZEPINE SCREEN, URINE: NOT DETECTED
BILIRUB SERPL-MCNC: 0.5 MG/DL (ref 0–1.2)
BILIRUBIN URINE: NEGATIVE
BLOOD, URINE: NORMAL
BUN BLDV-MCNC: 5 MG/DL (ref 6–20)
CALCIUM SERPL-MCNC: 9.4 MG/DL (ref 8.6–10.2)
CANNABINOID SCREEN URINE: NOT DETECTED
CELL COUNT FLUID TYPE: NORMAL
CHLORIDE BLD-SCNC: 105 MMOL/L (ref 98–107)
CLARITY: CLEAR
CO2: 26 MMOL/L (ref 22–29)
COCAINE METABOLITE SCREEN URINE: NOT DETECTED
COLOR FLUID: YELLOW
COLOR: YELLOW
CREAT SERPL-MCNC: 0.7 MG/DL (ref 0.5–1)
EOSINOPHILS ABSOLUTE: 0.09 E9/L (ref 0.05–0.5)
EOSINOPHILS RELATIVE PERCENT: 2 % (ref 0–6)
FENTANYL SCREEN, URINE: NOT DETECTED
GFR AFRICAN AMERICAN: >60
GFR NON-AFRICAN AMERICAN: >60 ML/MIN/1.73
GLUCOSE BLD-MCNC: 86 MG/DL (ref 74–99)
GLUCOSE URINE: NEGATIVE MG/DL
HCT VFR BLD CALC: 32.5 % (ref 34–48)
HEMOGLOBIN: 10.4 G/DL (ref 11.5–15.5)
IMMATURE GRANULOCYTES #: 0.01 E9/L
IMMATURE GRANULOCYTES %: 0.2 % (ref 0–5)
KETONES, URINE: NEGATIVE MG/DL
LEUKOCYTE ESTERASE, URINE: NEGATIVE
LYMPHOCYTES ABSOLUTE: 2.09 E9/L (ref 1.5–4)
LYMPHOCYTES RELATIVE PERCENT: 47.1 % (ref 20–42)
Lab: ABNORMAL
MCH RBC QN AUTO: 28.9 PG (ref 26–35)
MCHC RBC AUTO-ENTMCNC: 32 % (ref 32–34.5)
MCV RBC AUTO: 90.3 FL (ref 80–99.9)
METHADONE SCREEN, URINE: NOT DETECTED
MONOCYTE, FLUID: 92 %
MONOCYTES ABSOLUTE: 0.44 E9/L (ref 0.1–0.95)
MONOCYTES RELATIVE PERCENT: 9.9 % (ref 2–12)
NEUTROPHIL, FLUID: 8 %
NEUTROPHILS ABSOLUTE: 1.79 E9/L (ref 1.8–7.3)
NEUTROPHILS RELATIVE PERCENT: 40.3 % (ref 43–80)
NITRITE, URINE: NEGATIVE
NUCLEATED CELLS FLUID: 422 /UL
OPIATE SCREEN URINE: POSITIVE
OXYCODONE URINE: NOT DETECTED
PDW BLD-RTO: 14.3 FL (ref 11.5–15)
PH UA: 6 (ref 5–9)
PHENCYCLIDINE SCREEN URINE: NOT DETECTED
PLATELET # BLD: 347 E9/L (ref 130–450)
PMV BLD AUTO: 9.1 FL (ref 7–12)
POTASSIUM SERPL-SCNC: 3.8 MMOL/L (ref 3.5–5)
PROTEIN UA: NEGATIVE MG/DL
RBC # BLD: 3.6 E12/L (ref 3.5–5.5)
RBC FLUID: <2000 /UL
RBC UA: NORMAL /HPF (ref 0–2)
SEDIMENTATION RATE, ERYTHROCYTE: 38 MM/HR (ref 0–20)
SODIUM BLD-SCNC: 141 MMOL/L (ref 132–146)
SPECIFIC GRAVITY UA: 1.02 (ref 1–1.03)
TOTAL PROTEIN: 7.3 G/DL (ref 6.4–8.3)
UROBILINOGEN, URINE: 0.2 E.U./DL
WBC # BLD: 4.4 E9/L (ref 4.5–11.5)
WBC UA: NORMAL /HPF (ref 0–5)

## 2022-04-26 PROCEDURE — 85651 RBC SED RATE NONAUTOMATED: CPT

## 2022-04-26 PROCEDURE — 81001 URINALYSIS AUTO W/SCOPE: CPT

## 2022-04-26 PROCEDURE — 96375 TX/PRO/DX INJ NEW DRUG ADDON: CPT

## 2022-04-26 PROCEDURE — 87070 CULTURE OTHR SPECIMN AEROBIC: CPT

## 2022-04-26 PROCEDURE — 99285 EMERGENCY DEPT VISIT HI MDM: CPT

## 2022-04-26 PROCEDURE — 87491 CHLMYD TRACH DNA AMP PROBE: CPT

## 2022-04-26 PROCEDURE — 89051 BODY FLUID CELL COUNT: CPT

## 2022-04-26 PROCEDURE — 20610 DRAIN/INJ JOINT/BURSA W/O US: CPT

## 2022-04-26 PROCEDURE — 96374 THER/PROPH/DIAG INJ IV PUSH: CPT

## 2022-04-26 PROCEDURE — 89060 EXAM SYNOVIAL FLUID CRYSTALS: CPT

## 2022-04-26 PROCEDURE — 87040 BLOOD CULTURE FOR BACTERIA: CPT

## 2022-04-26 PROCEDURE — 6360000002 HC RX W HCPCS: Performed by: PHYSICIAN ASSISTANT

## 2022-04-26 PROCEDURE — 87205 SMEAR GRAM STAIN: CPT

## 2022-04-26 PROCEDURE — 80307 DRUG TEST PRSMV CHEM ANLYZR: CPT

## 2022-04-26 PROCEDURE — 2500000003 HC RX 250 WO HCPCS: Performed by: PHYSICIAN ASSISTANT

## 2022-04-26 PROCEDURE — 86140 C-REACTIVE PROTEIN: CPT

## 2022-04-26 PROCEDURE — 80053 COMPREHEN METABOLIC PANEL: CPT

## 2022-04-26 PROCEDURE — 36415 COLL VENOUS BLD VENIPUNCTURE: CPT

## 2022-04-26 PROCEDURE — 85025 COMPLETE CBC W/AUTO DIFF WBC: CPT

## 2022-04-26 PROCEDURE — 73562 X-RAY EXAM OF KNEE 3: CPT

## 2022-04-26 PROCEDURE — 87591 N.GONORRHOEAE DNA AMP PROB: CPT

## 2022-04-26 RX ORDER — MORPHINE SULFATE 4 MG/ML
4 INJECTION, SOLUTION INTRAMUSCULAR; INTRAVENOUS ONCE
Status: COMPLETED | OUTPATIENT
Start: 2022-04-26 | End: 2022-04-26

## 2022-04-26 RX ORDER — FENTANYL CITRATE 50 UG/ML
100 INJECTION, SOLUTION INTRAMUSCULAR; INTRAVENOUS ONCE
Status: DISCONTINUED | OUTPATIENT
Start: 2022-04-26 | End: 2022-04-26

## 2022-04-26 RX ORDER — ONDANSETRON 2 MG/ML
4 INJECTION INTRAMUSCULAR; INTRAVENOUS ONCE
Status: COMPLETED | OUTPATIENT
Start: 2022-04-26 | End: 2022-04-26

## 2022-04-26 RX ORDER — LIDOCAINE HYDROCHLORIDE 10 MG/ML
20 INJECTION, SOLUTION INFILTRATION; PERINEURAL ONCE
Status: COMPLETED | OUTPATIENT
Start: 2022-04-26 | End: 2022-04-26

## 2022-04-26 RX ORDER — FENTANYL CITRATE 50 UG/ML
75 INJECTION, SOLUTION INTRAMUSCULAR; INTRAVENOUS ONCE
Status: COMPLETED | OUTPATIENT
Start: 2022-04-26 | End: 2022-04-26

## 2022-04-26 RX ADMIN — FENTANYL CITRATE 75 MCG: 50 INJECTION, SOLUTION INTRAMUSCULAR; INTRAVENOUS at 22:40

## 2022-04-26 RX ADMIN — MORPHINE SULFATE 4 MG: 4 INJECTION, SOLUTION INTRAMUSCULAR; INTRAVENOUS at 20:07

## 2022-04-26 RX ADMIN — ONDANSETRON 4 MG: 2 INJECTION INTRAMUSCULAR; INTRAVENOUS at 20:06

## 2022-04-26 RX ADMIN — LIDOCAINE HYDROCHLORIDE 20 ML: 10 INJECTION, SOLUTION INFILTRATION; PERINEURAL at 22:45

## 2022-04-26 ASSESSMENT — PAIN SCALES - GENERAL
PAINLEVEL_OUTOF10: 9
PAINLEVEL_OUTOF10: 6
PAINLEVEL_OUTOF10: 10

## 2022-04-26 ASSESSMENT — PAIN DESCRIPTION - LOCATION: LOCATION: LEG

## 2022-04-26 ASSESSMENT — PAIN - FUNCTIONAL ASSESSMENT: PAIN_FUNCTIONAL_ASSESSMENT: 0-10

## 2022-04-26 ASSESSMENT — PAIN DESCRIPTION - ORIENTATION: ORIENTATION: LEFT

## 2022-04-26 NOTE — Clinical Note
Zavala Bltato accompanied Deepti Beavers to the emergency department on 4/26/2022. They may return to work on 04/28/2022. If you have any questions or concerns, please don't hesitate to call.       Alexei Valerio PA-C

## 2022-04-26 NOTE — ED PROVIDER NOTES
ED Attending shared visit  CC: Ofelia      [facility]  Department of Emergency Medicine   ED  Encounter Note  Admit Date/RoomTime: 2022  6:24 PM  ED Room:     NAME: Mia Marino  : 1980  MRN: [id number]     Chief Complaint:  Leg Pain (left leg. shooting pain up leg into buttocks)    History of Present Illness       Mia Marino is a 39 y.o. old female who presents to the emergency department by private vehicle, for non-traumatic pain located anterior to left knee  which occured a few day(s) prior to arrival, but she reports she has been having bilateral lower extremity pain on and off for a few months. The complaint is due to no known cause. Since onset the symptoms have been gradually worsening. Patient was in the ED 1 month ago with bilateral foot pain radiating to her knee. She has x-rays at that time which does show some bone spurs. At that time she had reported she was having nausea and chills for about 4 weeks. A full evaluation was undertaken which showed basically normal blood work, urinary tract infection with positive trichomonas. CAT scan of her abdomen was unremarkable at that time. 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 She reports she is still having nausea and chills/hot flashes. She has not tried to take her temperature. .  Her pain is aggraveated by any movement and relieved by splint or immobilization. Her weight bearing ability is: not possible secondary to pain, she can very slightly hop on the unaffected leg, which causes pain in the left leg. She denies any injury. She reports she did follow-up with a foot doctor who told her she needed to wear orthotics but then was released from podiatry care due to insurance issues. She denies history of Gonorrhea or chlamydia. Tetanus Status: unknown.      ROS   Pertinent positives and negatives are stated within HPI, all other systems reviewed and are negative. Past Medical History:  has a past medical history of Anxiety and depression, Breast disorder, Cancer ([address] Cervical cancer ([address] Depression, Eclampsia, Herpes simplex virus (HSV) infection, Hypothyroid, Mental disorder, Seizures ([address] and Thyroid disease. Surgical History:  has a past surgical history that includes Leg Surgery (Right); Inner ear surgery (Left); Cervix surgery; Breast lumpectomy (Bilateral); and Dilation and curettage of uterus. Social History:  reports that she has quit smoking. She has never used smokeless tobacco. She reports current alcohol use. She reports that she does not use drugs. Family History: family history is not on file. Allergies: Patient has no known allergies. Physical Exam   Oxygen Saturation Interpretation: Normal.        ED Triage Vitals [04/26/22 1822]   BP Temp Temp Source Pulse Resp SpO2 Height Weight   111/80 97.7 °F (36.5 °C) Temporal 98 18 99 % 5' 11\" (1.803 m) 190 lb (86.2 kg)         Constitutional:  Alert, development consistent with age. Neck:  Normal ROM. Supple. Left Knee: anterior, suprapatellar            Tenderness:  Severe, mostly over the anterior and superior knee, pain also anterior thigh though less severe. Swelling/Effusion: Mild. Deformity: no deformity observed/palpated. ROM: unable to test due to pain. Skin:  tenderness and warmth. Drawer's:  Unable to Assess. Lachman's: Unable to Assess. Apley's: Unable to Assess. Wade's: Unable to Assess. Valgus/Varus Stress: Unable to Assess. Crepitus: Unable to Assess. Hip:            Tenderness:  none. Swelling: None. Deformity: no.              ROM: diminished range with pain, due to pain in the knee with manipulation of hip, she reports the pain from knee radiated to the buttock. Skin:  no wounds, erythema, or swelling. Joint(s) Above/Below: ankle and foot. Tenderness:  none. Swelling: No.              Deformity: no deformity observed/palpated. ROM: full range of motion. Skin:  no wounds, erythema, or swelling. Neurovascular: Motor deficit: none. Sensory deficit: none. Pulse deficit: none. Capillary refill: normal.  Gait:  unable to be tested at this time. Lymphatics: No lymphangitis or adenopathy noted. Neurological:  Oriented. Motor functions intact.     Lab / Imaging Results   (All laboratory and radiology results have been personally reviewed by myself)  Labs:  Results for orders placed or performed during the hospital encounter of 04/26/22   C.trachomatis N.gonorrhoeae DNA, Urine    Specimen: Urine   Result Value Ref Range    Source Urine    CBC with Auto Differential   Result Value Ref Range    WBC 4.4 (L) 4.5 - 11.5 E9/L    RBC 3.60 3.50 - 5.50 E12/L    Hemoglobin 10.4 (L) 11.5 - 15.5 g/dL    Hematocrit 32.5 (L) 34.0 - 48.0 %    MCV 90.3 80.0 - 99.9 fL    MCH 28.9 26.0 - 35.0 pg    MCHC 32.0 32.0 - 34.5 %    RDW 14.3 11.5 - 15.0 fL    Platelets 144 554 - 586 E9/L    MPV 9.1 7.0 - 12.0 fL    Neutrophils % 40.3 (L) 43.0 - 80.0 %    Immature Granulocytes % 0.2 0.0 - 5.0 %    Lymphocytes % 47.1 (H) 20.0 - 42.0 %    Monocytes % 9.9 2.0 - 12.0 %    Eosinophils % 2.0 0.0 - 6.0 %    Basophils % 0.5 0.0 - 2.0 %    Neutrophils Absolute 1.79 (L) 1.80 - 7.30 E9/L    Immature Granulocytes # 0.01 E9/L    Lymphocytes Absolute 2.09 1.50 - 4.00 E9/L    Monocytes Absolute 0.44 0.10 - 0.95 E9/L    Eosinophils Absolute 0.09 0.05 - 0.50 E9/L    Basophils Absolute 0.02 0.00 - 0.20 E9/L   Sedimentation Rate   Result Value Ref Range    Sed Rate 38 (H) 0 - 20 mm/Hr   Comprehensive Metabolic Panel   Result Value Ref Range    Sodium 141 132 - 146 mmol/L    Potassium 3.8 3.5 - 5.0 mmol/L    Chloride 105 98 - 107 mmol/L    CO2 26 22 - 29 mmol/L Anion Gap 10 7 - 16 mmol/L    Glucose 86 74 - 99 mg/dL    BUN 5 (L) 6 - 20 mg/dL    CREATININE 0.7 0.5 - 1.0 mg/dL    GFR Non-African American >60 >=60 mL/min/1.73    GFR African American >60     Calcium 9.4 8.6 - 10.2 mg/dL    Total Protein 7.3 6.4 - 8.3 g/dL    Albumin 4.1 3.5 - 5.2 g/dL    Total Bilirubin 0.5 0.0 - 1.2 mg/dL    Alkaline Phosphatase 59 35 - 104 U/L    ALT 20 0 - 32 U/L    AST 26 0 - 31 U/L   Urinalysis   Result Value Ref Range    Color, UA Yellow Straw/Yellow    Clarity, UA Clear Clear    Glucose, Ur Negative Negative mg/dL    Bilirubin Urine Negative Negative    Ketones, Urine Negative Negative mg/dL    Specific Gravity, UA 1.020 1.005 - 1.030    Blood, Urine TRACE-INTACT Negative    pH, UA 6.0 5.0 - 9.0    Protein, UA Negative Negative mg/dL    Urobilinogen, Urine 0.2 <2.0 E.U./dL    Nitrite, Urine Negative Negative    Leukocyte Esterase, Urine Negative Negative   Microscopic Urinalysis   Result Value Ref Range    WBC, UA NONE 0 - 5 /HPF    RBC, UA NONE 0 - 2 /HPF    Bacteria, UA NONE SEEN None Seen /HPF   Cell Count with Differential, Body Fluid   Result Value Ref Range    Cell Count Fluid Type Synovial     Color, Fluid Yellow     Appearance, Fluid Hazy     Nucl Cell, Fluid 422 /uL    RBC, Fluid <2,000 /uL    Neutrophil Count, Fluid 8 %    Monocyte Count, Fluid 92 %   URINE DRUG SCREEN   Result Value Ref Range    Amphetamine Screen, Urine NOT DETECTED Negative <1000 ng/mL    Barbiturate Screen, Ur NOT DETECTED Negative < 200 ng/mL    Benzodiazepine Screen, Urine NOT DETECTED Negative < 200 ng/mL    Cannabinoid Scrn, Ur NOT DETECTED Negative < 50ng/mL    Cocaine Metabolite Screen, Urine NOT DETECTED Negative < 300 ng/mL    Opiate Scrn, Ur POSITIVE (A) Negative < 300ng/mL    PCP Screen, Urine NOT DETECTED Negative < 25 ng/mL    Methadone Screen, Urine NOT DETECTED Negative <300 ng/mL    Oxycodone Urine NOT DETECTED Negative <100 ng/mL    FENTANYL SCREEN, URINE NOT DETECTED Negative <1 ng/mL Drug Screen Comment: see below      Imaging: All Radiology results interpreted by Radiologist unless otherwise noted. XR KNEE LEFT (3 VIEWS)   Final Result   Addendum 1 of 1   ADDENDUM:   Correction: Please note that there is suprapatella soft tissue swelling,   compatible with joint effusion. Critical results were called by Dr. Shanta Foley to referring physician on   4/26/2022 at 19:29. Final   No acute abnormality of the knee. Joint effusion. ED Course / Medical Decision Making     Medications   cefTRIAXone (ROCEPHIN) 2 g injection (has no administration in time range)   sterile water injection (has no administration in time range)   0.9 % sodium chloride bolus (has no administration in time range)   morphine sulfate (PF) injection 4 mg (4 mg IntraVENous Given 4/26/22 2007)   ondansetron (ZOFRAN) injection 4 mg (4 mg IntraVENous Given 4/26/22 2006)   lidocaine 1 % injection 20 mL (20 mLs IntraDERmal Given 4/26/22 2245)   fentaNYL (SUBLIMAZE) injection 75 mcg (75 mcg IntraVENous Given 4/26/22 2240)   cefTRIAXone (ROCEPHIN) 2,000 mg in sodium chloride flush 20 mL IV syringe (2,000 mg IntraVENous Given 4/27/22 0049)        Consult(s):   IP CONSULT TO ORTHOPEDIC SURGERY  IP CONSULT TO INFECTIOUS DISEASES    Procedure(s):     PROCEDURE   4/27/22       Time:    ARTHROCENTESIS  Risks, benefits and alternatives (for applicable procedures below) described. Performed By: EM Attending Physician. Dr. Vi Weeks    Indication:  Joint pain, joint swelling and to obtain joint fluid for diagnostic testing. Informed consent: Written consent obtained. The patient was counseled regarding the procedure in person, it's indications, risks, potential complications and alternatives and any questions were answered. Consent was obtained. .  Prep:  The patient was positioned appropriately and the landmarks were identified. The skin was cleansed with povidone iodine and draped in a sterile fashion.   Local Anesthesia:  obtained with Lidocaine 1% without epinephrine. Arthrocentesis:  left knee aspiration was performed using a 18 gauge needle and 20 cc of tenacious, yellow, slightly hazy fluid was drained. Culture sent:  yes. A sterile dressing was then applied to the site. Complications: None. The patient tolerated the procedure with difficulty. MDM:    Patient presents to emergency with severe left knee pain causing immobility of the knee. The symptoms have been developing over the past several days and the pain radiates into her left thigh up into her buttocks. She was here 1 month ago and had bilateral feet pain and found to have bone spurs. At that time she also had a full evaluation due to chills/nausea and was found to have a urinary tract infection with positive trichomonas. She was given cefdinir and Flagyl for home but she did not take the medicines because she reported they made her sleepy. Patient has persisted with the nausea and chills in addition to newly developing left knee pain. Patient's presentation was consistent with septic arthritis and an arthrocentesis was performed. Cultures are pending at this time. Patient was covered with Rocephin 2 g in department. Blood cultures and urine cultures for gonorrhea and chlamydia were also sent. Patient is going to be admitted under observation for follow-up on the synovial fluid analysis and orthopedic consult. Pain control was provided with morphine in department which caused patient to be significantly sedated. Patient reports no history of IV drug abuse or any other type of drug abuse. Urine drug screen was positive for opiates but she had received a dose of morphine prior to collection of urine. Imaging was obtained based on moderate suspicion for joint effusion as per history/physical findings.        Plan of Care/Counseling:  Brooklynn Hatfield PA-C and EM Attending Physician reviewed today's visit with the patient in addition to providing specific details for the plan of care and counseling regarding the diagnosis and prognosis. Questions are answered at this time and are agreeable with the plan. Assessment      1. Acute pain of left knee      Plan   Observation Admission to Telemetry Unit. Patient condition is stable    New Medications     New Prescriptions    No medications on file     Electronically signed by Brooklynn Hatfield PA-C   DD: 4/26/22  **This report was transcribed using voice recognition software. Every effort was made to ensure accuracy; however, inadvertent computerized transcription errors may be present.   END OF ED PROVIDER NOTE       Brooklynn Hatfield PA-C  04/27/22 [id number]

## 2022-04-27 PROBLEM — M19.90 INFLAMMATORY ARTHRITIS: Status: ACTIVE | Noted: 2022-04-27

## 2022-04-27 PROBLEM — M25.462 EFFUSION OF LEFT KNEE JOINT: Status: ACTIVE | Noted: 2022-04-27

## 2022-04-27 PROBLEM — M00.9 SEPTIC ARTHRITIS (HCC): Status: ACTIVE | Noted: 2022-04-27

## 2022-04-27 PROBLEM — M00.9 SEPTIC ARTHRITIS (HCC): Status: RESOLVED | Noted: 2022-04-27 | Resolved: 2022-04-27

## 2022-04-27 LAB
ANION GAP SERPL CALCULATED.3IONS-SCNC: 8 MMOL/L (ref 7–16)
BUN BLDV-MCNC: 6 MG/DL (ref 6–20)
C-REACTIVE PROTEIN: 1.4 MG/DL (ref 0–0.4)
CALCIUM SERPL-MCNC: 9.1 MG/DL (ref 8.6–10.2)
CHLORIDE BLD-SCNC: 103 MMOL/L (ref 98–107)
CO2: 28 MMOL/L (ref 22–29)
CREAT SERPL-MCNC: 0.7 MG/DL (ref 0.5–1)
GFR AFRICAN AMERICAN: >60
GFR NON-AFRICAN AMERICAN: >60 ML/MIN/1.73
GLUCOSE BLD-MCNC: 94 MG/DL (ref 74–99)
GRAM STAIN ORDERABLE: NORMAL
HCT VFR BLD CALC: 32.8 % (ref 34–48)
HEMOGLOBIN: 10.6 G/DL (ref 11.5–15.5)
MCH RBC QN AUTO: 29.2 PG (ref 26–35)
MCHC RBC AUTO-ENTMCNC: 32.3 % (ref 32–34.5)
MCV RBC AUTO: 90.4 FL (ref 80–99.9)
PDW BLD-RTO: 14.4 FL (ref 11.5–15)
PLATELET # BLD: 355 E9/L (ref 130–450)
PMV BLD AUTO: 8.9 FL (ref 7–12)
POTASSIUM SERPL-SCNC: 4.2 MMOL/L (ref 3.5–5)
RBC # BLD: 3.63 E12/L (ref 3.5–5.5)
SODIUM BLD-SCNC: 139 MMOL/L (ref 132–146)
WBC # BLD: 5 E9/L (ref 4.5–11.5)

## 2022-04-27 PROCEDURE — 96361 HYDRATE IV INFUSION ADD-ON: CPT

## 2022-04-27 PROCEDURE — 6360000002 HC RX W HCPCS: Performed by: PHYSICIAN ASSISTANT

## 2022-04-27 PROCEDURE — 96375 TX/PRO/DX INJ NEW DRUG ADDON: CPT

## 2022-04-27 PROCEDURE — 2580000003 HC RX 258: Performed by: INTERNAL MEDICINE

## 2022-04-27 PROCEDURE — 80048 BASIC METABOLIC PNL TOTAL CA: CPT

## 2022-04-27 PROCEDURE — 96372 THER/PROPH/DIAG INJ SC/IM: CPT

## 2022-04-27 PROCEDURE — 85027 COMPLETE CBC AUTOMATED: CPT

## 2022-04-27 PROCEDURE — 6360000002 HC RX W HCPCS: Performed by: INTERNAL MEDICINE

## 2022-04-27 PROCEDURE — G0378 HOSPITAL OBSERVATION PER HR: HCPCS

## 2022-04-27 PROCEDURE — 99285 EMERGENCY DEPT VISIT HI MDM: CPT

## 2022-04-27 PROCEDURE — 36415 COLL VENOUS BLD VENIPUNCTURE: CPT

## 2022-04-27 PROCEDURE — 96376 TX/PRO/DX INJ SAME DRUG ADON: CPT

## 2022-04-27 PROCEDURE — 6370000000 HC RX 637 (ALT 250 FOR IP): Performed by: INTERNAL MEDICINE

## 2022-04-27 PROCEDURE — 2580000003 HC RX 258: Performed by: PHYSICIAN ASSISTANT

## 2022-04-27 PROCEDURE — 99220 PR INITIAL OBSERVATION CARE/DAY 70 MINUTES: CPT | Performed by: INTERNAL MEDICINE

## 2022-04-27 RX ORDER — ENOXAPARIN SODIUM 100 MG/ML
40 INJECTION SUBCUTANEOUS DAILY
Status: DISCONTINUED | OUTPATIENT
Start: 2022-04-27 | End: 2022-04-29 | Stop reason: HOSPADM

## 2022-04-27 RX ORDER — POLYETHYLENE GLYCOL 3350 17 G/17G
17 POWDER, FOR SOLUTION ORAL DAILY PRN
Status: DISCONTINUED | OUTPATIENT
Start: 2022-04-27 | End: 2022-04-29 | Stop reason: HOSPADM

## 2022-04-27 RX ORDER — ACETAMINOPHEN 325 MG/1
650 TABLET ORAL EVERY 6 HOURS PRN
Status: DISCONTINUED | OUTPATIENT
Start: 2022-04-27 | End: 2022-04-28

## 2022-04-27 RX ORDER — SODIUM CHLORIDE 0.9 % (FLUSH) 0.9 %
5-40 SYRINGE (ML) INJECTION EVERY 12 HOURS SCHEDULED
Status: DISCONTINUED | OUTPATIENT
Start: 2022-04-27 | End: 2022-04-29 | Stop reason: HOSPADM

## 2022-04-27 RX ORDER — ACETAMINOPHEN 650 MG/1
650 SUPPOSITORY RECTAL EVERY 6 HOURS PRN
Status: DISCONTINUED | OUTPATIENT
Start: 2022-04-27 | End: 2022-04-28

## 2022-04-27 RX ORDER — ONDANSETRON 4 MG/1
4 TABLET, ORALLY DISINTEGRATING ORAL EVERY 8 HOURS PRN
Status: DISCONTINUED | OUTPATIENT
Start: 2022-04-27 | End: 2022-04-29 | Stop reason: HOSPADM

## 2022-04-27 RX ORDER — ONDANSETRON 2 MG/ML
4 INJECTION INTRAMUSCULAR; INTRAVENOUS EVERY 6 HOURS PRN
Status: DISCONTINUED | OUTPATIENT
Start: 2022-04-27 | End: 2022-04-29 | Stop reason: HOSPADM

## 2022-04-27 RX ORDER — MORPHINE SULFATE 2 MG/ML
1 INJECTION, SOLUTION INTRAMUSCULAR; INTRAVENOUS
Status: DISCONTINUED | OUTPATIENT
Start: 2022-04-27 | End: 2022-04-29 | Stop reason: HOSPADM

## 2022-04-27 RX ORDER — 0.9 % SODIUM CHLORIDE 0.9 %
1000 INTRAVENOUS SOLUTION INTRAVENOUS ONCE
Status: COMPLETED | OUTPATIENT
Start: 2022-04-27 | End: 2022-04-27

## 2022-04-27 RX ORDER — SODIUM CHLORIDE 0.9 % (FLUSH) 0.9 %
5-40 SYRINGE (ML) INJECTION PRN
Status: DISCONTINUED | OUTPATIENT
Start: 2022-04-27 | End: 2022-04-29 | Stop reason: HOSPADM

## 2022-04-27 RX ORDER — KETOROLAC TROMETHAMINE 30 MG/ML
15 INJECTION, SOLUTION INTRAMUSCULAR; INTRAVENOUS EVERY 6 HOURS PRN
Status: DISCONTINUED | OUTPATIENT
Start: 2022-04-27 | End: 2022-04-28

## 2022-04-27 RX ORDER — MORPHINE SULFATE 2 MG/ML
1 INJECTION, SOLUTION INTRAMUSCULAR; INTRAVENOUS EVERY 4 HOURS PRN
Status: DISCONTINUED | OUTPATIENT
Start: 2022-04-27 | End: 2022-04-27

## 2022-04-27 RX ORDER — SODIUM CHLORIDE 9 MG/ML
INJECTION, SOLUTION INTRAVENOUS PRN
Status: DISCONTINUED | OUTPATIENT
Start: 2022-04-27 | End: 2022-04-29 | Stop reason: HOSPADM

## 2022-04-27 RX ORDER — CEFTRIAXONE 2 G/1
INJECTION, POWDER, FOR SOLUTION INTRAMUSCULAR; INTRAVENOUS
Status: DISPENSED
Start: 2022-04-27 | End: 2022-04-27

## 2022-04-27 RX ADMIN — Medication 10 ML: at 11:08

## 2022-04-27 RX ADMIN — MORPHINE SULFATE 1 MG: 2 INJECTION, SOLUTION INTRAMUSCULAR; INTRAVENOUS at 21:27

## 2022-04-27 RX ADMIN — SODIUM CHLORIDE 1000 ML: 9 INJECTION, SOLUTION INTRAVENOUS at 02:11

## 2022-04-27 RX ADMIN — MORPHINE SULFATE 1 MG: 2 INJECTION, SOLUTION INTRAMUSCULAR; INTRAVENOUS at 16:55

## 2022-04-27 RX ADMIN — ACETAMINOPHEN 650 MG: 325 TABLET ORAL at 05:52

## 2022-04-27 RX ADMIN — ONDANSETRON 4 MG: 2 INJECTION INTRAMUSCULAR; INTRAVENOUS at 21:29

## 2022-04-27 RX ADMIN — ENOXAPARIN SODIUM 40 MG: 100 INJECTION SUBCUTANEOUS at 08:32

## 2022-04-27 RX ADMIN — MORPHINE SULFATE 1 MG: 2 INJECTION, SOLUTION INTRAMUSCULAR; INTRAVENOUS at 12:40

## 2022-04-27 RX ADMIN — MORPHINE SULFATE 1 MG: 2 INJECTION, SOLUTION INTRAMUSCULAR; INTRAVENOUS at 08:32

## 2022-04-27 RX ADMIN — MORPHINE SULFATE 1 MG: 2 INJECTION, SOLUTION INTRAMUSCULAR; INTRAVENOUS at 02:52

## 2022-04-27 RX ADMIN — Medication 2000 MG: at 00:49

## 2022-04-27 ASSESSMENT — PAIN DESCRIPTION - LOCATION
LOCATION: LEG
LOCATION: KNEE;LEG

## 2022-04-27 ASSESSMENT — PAIN DESCRIPTION - ORIENTATION
ORIENTATION: LEFT

## 2022-04-27 ASSESSMENT — PAIN SCALES - GENERAL
PAINLEVEL_OUTOF10: 10
PAINLEVEL_OUTOF10: 9
PAINLEVEL_OUTOF10: 10
PAINLEVEL_OUTOF10: 9
PAINLEVEL_OUTOF10: 10
PAINLEVEL_OUTOF10: 10

## 2022-04-27 ASSESSMENT — PAIN DESCRIPTION - DESCRIPTORS
DESCRIPTORS: BURNING;PRESSURE
DESCRIPTORS: ACHING;BURNING
DESCRIPTORS: BURNING;PRESSURE
DESCRIPTORS: ACHING;BURNING;DISCOMFORT
DESCRIPTORS: BURNING;PRESSURE

## 2022-04-27 ASSESSMENT — PAIN DESCRIPTION - FREQUENCY: FREQUENCY: CONTINUOUS

## 2022-04-27 ASSESSMENT — PAIN - FUNCTIONAL ASSESSMENT: PAIN_FUNCTIONAL_ASSESSMENT: PREVENTS OR INTERFERES SOME ACTIVE ACTIVITIES AND ADLS

## 2022-04-27 ASSESSMENT — PAIN DESCRIPTION - ONSET: ONSET: ON-GOING

## 2022-04-27 ASSESSMENT — PAIN DESCRIPTION - PAIN TYPE: TYPE: ACUTE PAIN

## 2022-04-27 NOTE — PLAN OF CARE
Problem: Pain  Goal: Verbalizes/displays adequate comfort level or baseline comfort level  Outcome: Progressing     Problem: Safety - Adult  Goal: Free from fall injury  Outcome: Progressing     Problem: ABCDS Injury Assessment  Goal: Absence of physical injury  Outcome: Progressing

## 2022-04-27 NOTE — CONSULTS
Orthopaedic Consultation  Zulema Powers PA-C for Radhika Garcia MD      CHIEF COMPLAINT:  Left knee pain and lower extremity pain for a few days duration    History of Present Illness: Patient is a 40 yo female who presented to [facility]) ED 4-26-22 with complaints of left knee and lower extremity pain of a few days duration. Patient was seen at [facility]) ER 3-30-22 and seen for bilateral lower extremity pain that would begin at both feet and radiate into her knees an this has been present for approx 1 month. She states pain is now more localized to her left knee and pain will radiate into her thigh and left buttocks. Pain is diffusely present in her left knee with swelling, states that it has been warm. She denies LLE paresthesias or low back pain. Her knee was aspirated in the ER 4-26-22 as septic arthritis was suspected. 20 cc of tenacious, yellow slightly hazy synovial fluid was sent for gram stain, cell analysis, and culture. Blood cultures were obtained. She was given a dose of IV rocephin in the ED. When reviewing her chart patient was in the ER 3-30-22 for complaints of bilateral lower extremity pain, abdominal pain, nausea, chills and she states now that she continues to feel feverish and hot, nauseous but is not having chills. She has not taken her temperatures at home. On 3-30-22 she was diagnosed with a UTI and + trichomoniasis. She denies any other STI hx. Denies any other recent systemic infections. She did not fully complete the antibiotics she was prescribed for her UTI and trichomoniasis as they made her feel sleepy, she does state that she did take them for approximately 5 days but did not complete the prescription. She denies prior issues with her left knee. States that within the past few days knee pain has severely worsened where she cannot extend it or weight bear. NKI or fall.          Past Medical History:   Diagnosis Date    Anxiety and depression     Breast disorder     Cancer [facility])     Cervical    Cervical [address] [address]     pt doesn't remember when diagnosed, pt states she has been ca free for more than 5 years    Depression     Eclampsia 03/2011    Herpes simplex virus (HSV) infection     Hypothyroid     Mental disorder     Seizures ([facility] [address]     Thyroid disease          Past Surgical History:   Procedure Laterality Date    BREAST LUMPECTOMY Bilateral     CERVIX SURGERY      DILATION AND CURETTAGE OF UTERUS      INNER EAR SURGERY Left     LEG SURGERY Right        Medications Prior to Admission:    Medications Prior to Admission: naproxen (NAPROSYN) 500 MG tablet, Take 1 tablet by mouth 2 times daily for 5 days  ondansetron (ZOFRAN ODT) 8 MG TBDP disintegrating tablet, Place 1 tablet under the tongue every 8 hours as needed for Nausea (Patient not taking: Reported on 4/27/2022)  ibuprofen (ADVIL;MOTRIN) 800 MG tablet, Take 1 tablet by mouth every 8 hours (Patient not taking: Reported on 4/27/2022)    Allergies:    Patient has no known allergies. Social History:    reports that she has quit smoking. She has never used smokeless tobacco. She reports current alcohol use. She reports that she does not use drugs. Family History:   family history is not on file. REVIEW OF SYSTEMS:  As above in the HPI, otherwise negative    PHYSICAL EXAM:    Vitals:  /67   Pulse 83   Temp 97.5 °F (36.4 °C)   Resp 18   Ht 5' 11\" (1.803 m)   Wt 168 lb 9.6 oz (76.5 kg)   SpO2 100%   BMI 23.51 kg/m²     General:  Awake, alert, oriented X 3. Well developed, well nourished, well groomed. No apparent distress. HEENT:  Normocephalic, atraumatic. Pupils equal, round, reactive to light. No scleral icterus. No conjunctival injection. Normal lips, teeth, and gums. No nasal discharge. Neck:  Supple  Heart:  RRR, no murmurs, gallops, or rubs  Lungs:  CTA bilaterally, bilat symmetrical expansion, no wheeze, rales, or rhonchi  Abdomen:   Bowel sounds present, soft, nontender, no masses, no organomegaly, no peritoneal signs  Extremities:  Left knee no warmth, no erythema, no effusion. Attempted to have patient perform active extension  but she is in too much pain to do so. Diffuse TTP. Keeps knee flexed at approx 90 degrees with pillow between legs. Calf soft and NT. No ankle or foot TTP. Skin:  Warm and dry, no open lesions or rash  Neuro:  Cranial nerves 2-12 intact, no focal deficits  Breast: deferred  Rectal: deferred  Genitalia:  deferred    LABS:  CBC:   Lab Results   Component Value Date    WBC 5.0 04/27/2022    RBC 3.63 04/27/2022    HGB 10.6 04/27/2022    HCT 32.8 04/27/2022    MCV 90.4 04/27/2022    MCH 29.2 04/27/2022    MCHC 32.3 04/27/2022    RDW 14.4 04/27/2022     04/27/2022    MPV 8.9 04/27/2022     WBC:    Lab Results   Component Value Date    WBC 5.0 04/27/2022     U/A:    Lab Results   Component Value Date    COLORU Yellow 04/26/2022    PROTEINU Negative 04/26/2022    PHUR 6.0 04/26/2022    WBCUA NONE 04/26/2022    RBCUA NONE 04/26/2022    MUCUS Present 03/30/2022    TRICHOMONAS Present 03/30/2022    BACTERIA NONE SEEN 04/26/2022    CLARITYU Clear 04/26/2022    SPECGRAV 1.020 04/26/2022    LEUKOCYTESUR Negative 04/26/2022    UROBILINOGEN 0.2 04/26/2022    BILIRUBINUR Negative 04/26/2022    BLOODU TRACE-INTACT 04/26/2022    GLUCOSEU Negative 04/26/2022   ESR 38 elevated  CRP 1.4 elevated    Cell analysis synoval fluid  Color yellow  Appearance hazy  Nucl cell fluid 422  RBC fluid <2,000  Neutrophil count fluid 8    Synovial fluid crystal analysis pending  Blood cultures x 2 pending    Gram Stain Gram stain performed on unspun fluid   Polymorphonuclear leukocytes not seen   Epithelial cells not seen   Rare Mononuclear leukocytes   No organisms seen.      Left knee XR 4-26-22  Early tricompartmental cecily changes, suprapatellar swelling, no fracture    ASSESSMENT:      Patient Active Problem List   Diagnosis    Pregnancy    Nausea and vomiting during pregnancy    Irregular contractions    39 weeks gestation of pregnancy    Septic arthritis ([address]    Effusion of left knee joint       PLAN:    38 yo female presenting with acute onset of left knee pain, swelling with radiation of pain into her left thigh and buttocks region. Recently diagnosed 3-30-22 with UTI and trichomoniasis and did not complete full course of recommended antibiotic treatment for these conditions. Left knee was aspirated in the ER upon presentation and is currently showing on gram stain no organisms and WBC on synovial fluid analysis is low at 422. Will continue to follow patient for final culture results on synovial fluid and blood cultures, but currently results are not indicative of septic arthritis. She can continue with PT efforts in the interim for gait training and mobilization as tolerated.      [employer] SOFIYA

## 2022-04-27 NOTE — H&P
[facility] History and Physical      CHIEF COMPLAINT:   Left knee pain    History of Present Illness:      59-year-old female presents to the ER for left knee pain. She states that she has had intermittent left knee pain for several months. She states though recently its been getting worse and swelling has increased. Patient has been taking over-the-counter ibuprofen for the pain however it is not controlling it. Patient is having a hard time weightbearing on that left knee. In the ER it was difficult for her to get up and out of bed. Apparently patient was in the ED about a month ago with a nausea and chills. She was found to be positive for trichomoniasis. She also had bilateral foot pain at that time and was told to go to podiatry. Patient went for what appointment and then did not continue due to insurance issues. In the ED the knee has been drained at bedside and cultures have been sent. Thus far she has received Rocephin 2 g IV x1, fentanyl 75 mcg IV x1, lidocaine intradermal x1, morphine 4 mg IV x1, and Zofran 4 mg IV x1.     Informant(s) for H&P: Patient    REVIEW OF SYSTEMS:  A comprehensive review of systems was negative except for: what is in the HPI      PMH:  Past Medical History:   Diagnosis Date    Anxiety and depression     Breast disorder     Cancer ([address]     Cervical    Cervical cancer ([address]     pt doesn't remember when diagnosed, pt states she has been ca free for more than 5 years    Depression     Eclampsia 03/2011    Herpes simplex virus (HSV) infection     Hypothyroid     Mental disorder     Seizures ([address]     Thyroid disease        Surgical History:  Past Surgical History:   Procedure Laterality Date    BREAST LUMPECTOMY Bilateral     CERVIX SURGERY      DILATION AND CURETTAGE OF UTERUS      INNER EAR SURGERY Left     LEG SURGERY Right        Medications Prior to Admission:    Prior to Admission medications    Medication Sig Start Date End Date Taking? Authorizing Provider   naproxen (NAPROSYN) 500 MG tablet Take 1 tablet by mouth 2 times daily for 5 days 3/30/22 4/4/22  Caity Morton,    ondansetron (ZOFRAN ODT) 8 MG TBDP disintegrating tablet Place 1 tablet under the tongue every 8 hours as needed for Nausea 3/30/22   Caity Morton DO   ibuprofen (ADVIL;MOTRIN) 800 MG tablet Take 1 tablet by mouth every 8 hours 12/12/17   Kisha Stapleton MD       Allergies:    Patient has no known allergies. Social History:    reports that she has quit smoking. She has never used smokeless tobacco. She reports current alcohol use. She reports that she does not use drugs. Family History:   family history is not on file.        PHYSICAL EXAM:  Vitals:  BP 99/64   Pulse 72   Temp 97.7 °F (36.5 °C) (Temporal)   Resp 18   Ht 5' 11\" (1.803 m)   Wt 190 lb (86.2 kg)   SpO2 97%   BMI 26.50 kg/m²     General Appearance: alert and oriented to person, place and time and in no acute distress  Skin: warm and dry  Head: normocephalic and atraumatic  Eyes: pupils equal, round, and reactive to light, extraocular eye movements intact, conjunctivae normal  Neck: neck supple and non tender without mass   Pulmonary/Chest: clear to auscultation bilaterally- no wheezes, rales or rhonchi, normal air movement, no respiratory distress  Cardiovascular: normal rate, normal S1 and S2 and no carotid bruits  Abdomen: soft, non-tender, non-distended, normal bowel sounds, no masses or organomegaly  Extremities: no cyanosis, no clubbing and no edema, left knee swelling and pain  Neurologic: no cranial nerve deficit and speech normal        LABS:  Recent Labs     04/26/22 1918      K 3.8      CO2 26   BUN 5*   CREATININE 0.7   GLUCOSE 86   CALCIUM 9.4       Recent Labs     04/26/22 1918   WBC 4.4*   RBC 3.60   HGB 10.4*   HCT 32.5*   MCV 90.3   MCH 28.9   MCHC 32.0   RDW 14.3      MPV 9.1       No results for input(s): POCGLU in the last 72 hours. Radiology:   XR KNEE LEFT (3 VIEWS)   Final Result   Addendum 1 of 1   ADDENDUM:   Correction: Please note that there is suprapatella soft tissue swelling,   compatible with joint effusion. Critical results were called by Dr. Erik Villa to referring physician on   4/26/2022 at 19:29. Final   No acute abnormality of the knee. Joint effusion. ASSESSMENT:      Principal Problem:    Septic arthritis ([address]  Active Problems:    Effusion of left knee joint  Resolved Problems:    * No resolved hospital problems. *      PLAN:    1. Left knee joint effusion S/P bedside I&D R/O septic joint - Follow up with blood cultures as well as effusion cultures. Received rocephin in the ER. Will consult ID for further antibiotic management. Morphine PRN for pain. PT/OT. 2. DVT prophylaxis - Lovenox    NOTE: This report was transcribed using voice recognition software. Every effort was made to ensure accuracy; however, inadvertent computerized transcription errors may be present.     Electronically signed by Reema Avila DO on 4/27/2022 at 12:54 AM

## 2022-04-27 NOTE — CARE COORDINATION
Introduced my self and provided explanation of CM role to patient. Patient is aware of current diagnosis and treatment plan including pt/ot evals, ortho consult. Patient is somewhat sleepy, gives short answers and drifts back to sleep during screening. She does voice that she resides at home with her 3 yr old son (he is in care of patient's other daughter). She has no pcp but does desire to self schedule and establish pcp post discharge. She denies issues with obtaining prescriptions. Await pt/ot evals for final dc planning needs. Nursing reports she is slow to ambulate and will not bear weight to affected lower extremity. Christiana Morton.  Diane, MSN, RN  [facility] Case Management  [phone]

## 2022-04-27 NOTE — CONSULTS
[address] [place] [address] [facility]  Consultation Note     Admit Date: 4/26/2022  6:24 PM    Reason for Consult:   Possible left septic knee    Attending Physician:  Jeneal Halsted, DO    HISTORY OF PRESENT ILLNESS:             The history is obtained from extensive review of available past medical records. The patient is a 39 y.o. female who is not known to the ID service. The patient presents to the ED at [facility] on 4/26/2022 with a several month history of pain in the left knee. She was afebrile and vital signs were unremarkable. White count was 4.4. ESR was 38. She underwent an arthrocentesis with 422 WBCs, predominantly monocytes. Drug screen was positive for opiates. She was treated with Ceftriaxone. Past Medical History:        Diagnosis Date    Anxiety and depression     Breast disorder     Cancer ([address]     Cervical    C[facility]     pt doesn't remember when diagnosed, pt states she has been ca free for more than 5 years    Depression     Eclampsia 03/2011    Herpes simplex virus (HSV) infection     Hypothyroid     Mental disorder     Seizures ([facility]     Thyroid disease      Past Surgical History:        Procedure Laterality Date    BREAST LUMPECTOMY Bilateral     CERVIX SURGERY      DILATION AND CURETTAGE OF UTERUS      INNER EAR SURGERY Left     LEG SURGERY Right      Current Medications:   Scheduled Meds:   sodium chloride flush  5-40 mL IntraVENous 2 times per day    enoxaparin  40 mg SubCUTAneous Daily     Continuous Infusions:   sodium chloride       PRN Meds:sodium chloride flush, sodium chloride, ondansetron **OR** ondansetron, polyethylene glycol, acetaminophen **OR** acetaminophen, morphine    Allergies:  Patient has no known allergies.     Social History:   Social History     Socioeconomic History    Marital status: Single     Spouse name: None    Number of children: None    Years of education: None    Highest education level: None Occupational History    None   Tobacco Use    Smoking status: Former Smoker    Smokeless tobacco: Never Used   Vaping Use    Vaping Use: Never used   Substance and Sexual Activity    Alcohol use: Yes     Comment: socially    Drug use: No    Sexual activity: Not Currently     Partners: Male     Comment: begining of pregnancy -march   Other Topics Concern    None   Social History Narrative    None     Social Determinants of Health     Financial Resource Strain:     Difficulty of Paying Living Expenses: Not on file   Food Insecurity:     Worried About Running Out of Food in the Last Year: Not on file    Stephen of Food in the Last Year: Not on file   Transportation Needs:     Lack of Transportation (Medical): Not on file    Lack of Transportation (Non-Medical): Not on file   Physical Activity:     Days of Exercise per Week: Not on file    Minutes of Exercise per Session: Not on file   Stress:     Feeling of Stress : Not on file   Social Connections:     Frequency of Communication with Friends and Family: Not on file    Frequency of Social Gatherings with Friends and Family: Not on file    Attends Restoration Services: Not on file    Active Member of [address] [place] [address] or Organizations: Not on file    Attends Club or Organization Meetings: Not on file    Marital Status: Not on file   Intimate Partner Violence:     Fear of Current or Ex-Partner: Not on file    Emotionally Abused: Not on file    Physically Abused: Not on file    Sexually Abused: Not on file   Housing Stability:     Unable to Pay for Housing in the Last Year: Not on file    Number of [place] in the Last Year: Not on file    Unstable Housing in the Last Year: Not on file      Pets: None  Travel: No  The patient lives alone. She is unemployed    Family History:   No family history on file. . Otherwise non-pertinent to the chief complaint.     REVIEW OF SYSTEMS:    Constitutional: Negative for fevers, chills, diaphoresis  Neurologic: Negative   Psychiatric: Negative  Rheumatologic: Negative   Endocrine: Negative  Hematologic: Negative  Immunologic: Negative  ENT: Negative  Respiratory: Negative   Cardiovascular: Negative  GI: Negative  : Negative  Musculoskeletal: As in the HPI. In addition to this she said that her right knee was also hurting. Skin: No rashes. PHYSICAL EXAM:    Vitals:   /67   Pulse 83   Temp 97.5 °F (36.4 °C)   Resp 18   Ht 5' 11\" (1.803 m)   Wt 168 lb 9.6 oz (76.5 kg)   SpO2 100%   BMI 23.51 kg/m²   Constitutional: The patient is asleep. Arousable. No distress. Skin: Warm and dry. No rashes were noted. HEENT: Eyes show round, and reactive pupils. No jaundice. Moist mucous membranes, no ulcerations, no thrush. Neck: Supple to movements. No lymphadenopathy. Chest: No use of accessory muscles to breathe. Symmetrical expansion. Auscultation reveals no wheezing, crackles, or rhonchi. Cardiovascular: S1 and S2 are rhythmic and regular. No murmurs appreciated. Abdomen: Positive bowel sounds to auscultation. Benign to palpation. No masses felt. No hepatosplenomegaly. Extremities: Left knee has an ice pack on it. The skin his cool. There is minimal to no effusion. Decreased range of motion. Lines: Peripheral.      CBC+dif:  Recent Labs     04/26/22 1918 04/26/22 1918 04/27/22  0940   WBC 4.4*  --  5.0   HGB 10.4*   < > 10.6*   HCT 32.5*   < > 32.8*   MCV 90.3   < > 90.4      < > 355   NEUTROABS 1.79*  --   --     < > = values in this interval not displayed.      Lab Results   Component Value Date    CRP 1.4 (H) 04/26/2022      No results found for: CRPHS  Lab Results   Component Value Date    SEDRATE 38 (H) 04/26/2022     Lab Results   Component Value Date    ALT 20 04/26/2022    AST 26 04/26/2022    ALKPHOS 59 04/26/2022    BILITOT 0.5 04/26/2022     Lab Results   Component Value Date     04/27/2022    K 4.2 04/27/2022    K 3.9 03/30/2022     04/27/2022    CO2 28 04/27/2022 BUN 6 04/27/2022    CREATININE 0.7 04/27/2022    GFRAA >60 04/27/2022    LABGLOM >60 04/27/2022    GLUCOSE 94 04/27/2022    PROT 7.3 04/26/2022    LABALBU 4.1 04/26/2022    CALCIUM 9.1 04/27/2022    BILITOT 0.5 04/26/2022    ALKPHOS 59 04/26/2022    AST 26 04/26/2022    ALT 20 04/26/2022       No results found for: PROTIME, INR    No results found for: TSH    Lab Results   Component Value Date    COLORU Yellow 04/26/2022    PHUR 6.0 04/26/2022    WBCUA NONE 04/26/2022    RBCUA NONE 04/26/2022    MUCUS Present 03/30/2022    TRICHOMONAS Present 03/30/2022    BACTERIA NONE SEEN 04/26/2022    CLARITYU Clear 04/26/2022    SPECGRAV 1.020 04/26/2022    LEUKOCYTESUR Negative 04/26/2022    UROBILINOGEN 0.2 04/26/2022    BILIRUBINUR Negative 04/26/2022    BLOODU TRACE-INTACT 04/26/2022    GLUCOSEU Negative 04/26/2022       No results found for: HCO3, BE, O2SAT, PH, THGB, PCO2, PO2, TCO2  Radiology:  Reviewed    Microbiology:  Pending  No results for input(s): BC in the last 72 hours. No results for input(s): ORG in the last 72 hours. No results for input(s): Sandrine Candle in the last 72 hours. No results for input(s): STREPNEUMAGU in the last 72 hours. No results for input(s): LP1UAG in the last 72 hours. No results for input(s): ASO in the last 72 hours. No results for input(s): CULTRESP in the last 72 hours. No results for input(s): PROCAL in the last 72 hours. Assessment:  · Inflammatory arthritis left knee. Doubt infectious given the low cell count. Crystals are pending  · Leukopenia    Plan:    · Antibiotics are not warranted  · Check cultures, baseline ESR, CRP  · Supportive treatment  · Check crystals in synovial fluid  · Uric acid  · Will follow with you    Thank you for having us see this patient in consultation. I will be discussing this case with the treating physicians.     Patt Osborne MD  1:29 PM  4/27/2022

## 2022-04-27 NOTE — PROGRESS NOTES
Physical Therapy  Facility/Department: [facility] MED SURG      Name: Yessenia Mittal  : 1980  MRN: [id number]      Order received and chart reviewed. Will await to complete PT initial evaluation until orthopedics has made their recommendations. Will continue to follow.      Evi Trejo, [address]

## 2022-04-27 NOTE — PROGRESS NOTES
[address] [place], [place] [address]ist   Progress Note    Admitting Date and Time: 4/26/2022  6:24 PM  Admit Dx: Septic arthritis ([address] [M00.9]  Effusion of left knee joint [M25.462]  Acute pain of left knee [M25.562]    Subjective/interval history:    Pt was admitted early this morning with left knee effusion and concern for septic joint. She received IV antibiotics in the ED. Cell counts on joint aspiration not consistent with septic joint, so antibiotics stopped per ID recommendation. Synovial fluid crystals pending. Pain fairly well controlled with as needed IV morphine, though it is not lasting very long. States she gets nauseous and throws up with oral pain medications. ROS: denies fever, chills, cp, sob, n/v, HA unless stated above.  sodium chloride flush  5-40 mL IntraVENous 2 times per day    enoxaparin  40 mg SubCUTAneous Daily     sodium chloride flush, 5-40 mL, PRN  sodium chloride, , PRN  ondansetron, 4 mg, Q8H PRN   Or  ondansetron, 4 mg, Q6H PRN  polyethylene glycol, 17 g, Daily PRN  acetaminophen, 650 mg, Q6H PRN   Or  acetaminophen, 650 mg, Q6H PRN  morphine, 1 mg, Q4H PRN         Objective:    BP 99/61   Pulse 82   Temp 97.7 °F (36.5 °C)   Resp 18   Ht 5' 11\" (1.803 m)   Wt 168 lb 9.6 oz (76.5 kg)   SpO2 97%   BMI 23.51 kg/m²   General Appearance: alert and oriented to person, place and time and in no acute distress  Skin: warm and dry  Head: normocephalic and atraumatic  Eyes: pupils equal, round, and reactive to light, extraocular eye movements intact, conjunctivae normal  Neck: neck supple and non tender without mass   Pulmonary/Chest: clear to auscultation bilaterally- no wheezes, rales or rhonchi, normal air movement, no respiratory distress  Cardiovascular: normal rate, normal S1 and S2 and no carotid bruits  Abdomen: soft, non-tender, non-distended, normal bowel sounds, no masses or organomegaly   Extremities: Left knee with mild effusion, tender and warm to palpation. No peripheral edema. No cyanosis or clubbing. Neurologic: no cranial nerve deficit and speech normal      Recent Labs     04/26/22 1918 04/27/22  0940    139   K 3.8 4.2    103   CO2 26 28   BUN 5* 6   CREATININE 0.7 0.7   GLUCOSE 86 94   CALCIUM 9.4 9.1       Recent Labs     04/26/22 1918   ALKPHOS 59   PROT 7.3   LABALBU 4.1   BILITOT 0.5   AST 26   ALT 20       Recent Labs     04/26/22 1918 04/27/22  0940   WBC 4.4* 5.0   RBC 3.60 3.63   HGB 10.4* 10.6*   HCT 32.5* 32.8*   MCV 90.3 90.4   MCH 28.9 29.2   MCHC 32.0 32.3   RDW 14.3 14.4    355   MPV 9.1 8.9         Radiology:   XR KNEE LEFT (3 VIEWS)   Final Result   Addendum 1 of 1   ADDENDUM:   Correction: Please note that there is suprapatella soft tissue swelling,   compatible with joint effusion. Critical results were called by Dr. Randy Zeng to referring physician on   4/26/2022 at 19:29. Final   No acute abnormality of the knee. Joint effusion. Assessment and Plan:  Principal Problem:    Effusion of left knee joint  Active Problems:    Inflammatory arthritis  Resolved Problems:    Septic arthritis ([facility]      1. Left knee effusion   -Likely inflammatory arthritis, cell counts not consistent with septic arthritis  -Biotics discontinued per ID recommendation  -Synovial fluid crystals pending  -uric acid and ESR ordered   -Increase frequency of morphine slightly to every 3 hours as needed for now, but will add as needed low-dose IV Toradol to try to wean from opiate pain medications. DVT prophylaxis: Enoxaparin  CODE STATUS: Full code    Disposition: Anticipate discharge home. Possibly tomorrow or Friday depending on lab results and pain control. NOTE: This report was transcribed using voice recognition software. Every effort was made to ensure accuracy; however, inadvertent computerized transcription errors may be present.      Electronically signed by Alejandra Mcdaniel DO on 4/27/2022 at 5:55 PM

## 2022-04-27 NOTE — DISCHARGE INSTR - COC
Continuity of Care Form    Patient Name: Vanna Gordon   :  1980  MRN:  [id number]    Admit date:  2022  Discharge date:  ***    Code Status Order: Prior   Advance Directives:      Admitting Physician:  Purvi Fu DO  PCP: No primary care provider on file. Discharging Nurse: [facility] Unit/Room#:   Discharging Unit Phone Number: ***    Emergency Contact:   Extended Emergency Contact Information  Primary Emergency Contact: Floyd Gomez  Address: [address] [place] [address] Phone: [phone]  Relation: Other    Past Surgical History:  Past Surgical History:   Procedure Laterality Date    BREAST LUMPECTOMY Bilateral     CERVIX SURGERY      DILATION AND CURETTAGE OF UTERUS      INNER EAR SURGERY Left     LEG SURGERY Right        Immunization History: There is no immunization history for the selected administration types on file for this patient.     Active Problems:  Patient Active Problem List   Diagnosis Code    Pregnancy Z34.90    Nausea and vomiting during pregnancy O21.9    Irregular contractions O47.9    39 weeks gestation of pregnancy Z3A.39    Septic arthritis (HCC) M00.9    Effusion of left knee joint M25.462       Isolation/Infection:   Isolation            No Isolation          Patient Infection Status       Infection Onset Added Last Indicated Last Indicated By Review Planned Expiration Resolved Resolved By    None active    Resolved    COVID-19 (Rule Out) 22 COVID-19, Rapid (Ordered)   22 Rule-Out Test Resulted    COVID-19 (Rule Out) 21 COVID-19, Rapid (Ordered)   21 Rule-Out Test Resulted            Nurse Assessment:  Last Vital Signs: BP 99/64   Pulse 72   Temp 97.7 °F (36.5 °C) (Temporal)   Resp 18   Ht 5' 11\" (1.803 m)   Wt 190 lb (86.2 kg)   SpO2 97%   BMI 26.50 kg/m²     Last documented pain score (0-10 scale): Pain Level: 6  Last Weight: Wt Readings from Last 1 Encounters:   22 190 lb (86.2 kg)     Mental Status:  {IP PT MENTAL STATUS:}    IV Access:  { EDENILSON IV ACCESS:714271643}    Nursing Mobility/ADLs:  Walking   {CHP DME PFMS:475725316}  Transfer  {CHP DME LTRA:495872502}  Bathing  {CHP DME MQRA:509623629}  Dressing  {CHP DME OBKE:674798542}  Toileting  {CHP DME CUIS:890245166}  Feeding  {CHP DME LQHH:016189832}  Med Admin  {CHP DME IGDY:946506560}  Med Delivery   { EDENILSON MED Delivery:696828724}    Wound Care Documentation and Therapy:        Elimination:  Continence: Bowel: {YES / FH:37426}  Bladder: {YES / UB:35394}  Urinary Catheter: {Urinary Catheter:583670678}   Colostomy/Ileostomy/Ileal Conduit: {YES / ZN:56136}       Date of Last BM: ***  No intake or output data in the 24 hours ending 22 0057  No intake/output data recorded.     Safety Concerns:     [address] [employer] Safety Concerns:569017013}    Impairments/Disabilities:      [address] [employer] Impairments/Disabilities:855511865}    Nutrition Therapy:  Current Nutrition Therapy:   [address] [employer] Diet List:851584310}    Routes of Feeding: {CHP DME Other Feedings:198727637}  Liquids: {Slp liquid thickness:24902}  Daily Fluid Restriction: {CHP DME Yes amt example:563298812}  Last Modified Barium Swallow with Video (Video Swallowing Test): {Done Not Done UVQP:408859479}    Treatments at the Time of Hospital Discharge:   Respiratory Treatments: ***  Oxygen Therapy:  {Therapy; copd oxygen:31660}  Ventilator:    {[facility] Vent CIUH:625268687}    Rehab Therapies: {THERAPEUTIC INTERVENTION:0049109683}  Weight Bearing Status/Restrictions: [address] Weight Bearin}  Other Medical Equipment (for information only, NOT a DME order):  {EQUIPMENT:259856781}  Other Treatments: ***    Patient's personal belongings (please select all that are sent with patient):  {P DME Belongings:588881921}    RN SIGNATURE:  {Esignature:127455618}    CASE MANAGEMENT/SOCIAL WORK SECTION    Inpatient Status Date: ***    Readmission Risk Assessment Score:  Readmission Risk              Risk of Unplanned Readmission:  0           Discharging to Facility/ Agency   Name:   Address:  Phone:  Fax:    Dialysis Facility (if applicable)   Name:  Address:  Dialysis Schedule:  Phone:  Fax:    / signature: {Esignature:600205474}    PHYSICIAN SECTION    Prognosis: {Prognosis:6813137378}    Condition at Discharge: [address] Beat[address] Birch Patient Condition:161430237}    Rehab Potential (if transferring to Rehab): {Prognosis:4378555284}    Recommended Labs or Other Treatments After Discharge: ***    Physician Certification: I certify the above information and transfer of Mana Gar  is necessary for the continuing treatment of the diagnosis listed and that she requires {Admit to Appropriate Level of Care:63629} for {GREATER/LESS:442792699} 30 days.      Update Admission H&P: {CHP DME Changes in BMLEX:972609113}    PHYSICIAN SIGNATURE:  {Esignature:854727867}

## 2022-04-28 PROBLEM — R26.2 DIFFICULTY WALKING: Status: ACTIVE | Noted: 2022-04-28

## 2022-04-28 LAB
ANION GAP SERPL CALCULATED.3IONS-SCNC: 9 MMOL/L (ref 7–16)
BUN BLDV-MCNC: 6 MG/DL (ref 6–20)
CALCIUM SERPL-MCNC: 9.7 MG/DL (ref 8.6–10.2)
CHLORIDE BLD-SCNC: 102 MMOL/L (ref 98–107)
CO2: 27 MMOL/L (ref 22–29)
CREAT SERPL-MCNC: 0.7 MG/DL (ref 0.5–1)
CRYSTALS, FLUID: NORMAL
GFR AFRICAN AMERICAN: >60
GFR NON-AFRICAN AMERICAN: >60 ML/MIN/1.73
GLUCOSE BLD-MCNC: 92 MG/DL (ref 74–99)
HCT VFR BLD CALC: 33.7 % (ref 34–48)
HEMOGLOBIN: 11 G/DL (ref 11.5–15.5)
MCH RBC QN AUTO: 28.9 PG (ref 26–35)
MCHC RBC AUTO-ENTMCNC: 32.6 % (ref 32–34.5)
MCV RBC AUTO: 88.7 FL (ref 80–99.9)
PDW BLD-RTO: 14.1 FL (ref 11.5–15)
PLATELET # BLD: 379 E9/L (ref 130–450)
PMV BLD AUTO: 8.8 FL (ref 7–12)
POTASSIUM SERPL-SCNC: 3.8 MMOL/L (ref 3.5–5)
RBC # BLD: 3.8 E12/L (ref 3.5–5.5)
SEDIMENTATION RATE, ERYTHROCYTE: 40 MM/HR (ref 0–20)
SODIUM BLD-SCNC: 138 MMOL/L (ref 132–146)
SOURCE BODY FLUID: NORMAL
URIC ACID, SERUM: 7 MG/DL (ref 2.4–5.7)
WBC # BLD: 4.7 E9/L (ref 4.5–11.5)

## 2022-04-28 PROCEDURE — 6360000002 HC RX W HCPCS: Performed by: NURSE PRACTITIONER

## 2022-04-28 PROCEDURE — 2580000003 HC RX 258: Performed by: INTERNAL MEDICINE

## 2022-04-28 PROCEDURE — 80048 BASIC METABOLIC PNL TOTAL CA: CPT

## 2022-04-28 PROCEDURE — 6360000002 HC RX W HCPCS: Performed by: INTERNAL MEDICINE

## 2022-04-28 PROCEDURE — 97161 PT EVAL LOW COMPLEX 20 MIN: CPT

## 2022-04-28 PROCEDURE — 96372 THER/PROPH/DIAG INJ SC/IM: CPT

## 2022-04-28 PROCEDURE — 99225 PR SBSQ OBSERVATION CARE/DAY 25 MINUTES: CPT | Performed by: FAMILY MEDICINE

## 2022-04-28 PROCEDURE — 36415 COLL VENOUS BLD VENIPUNCTURE: CPT

## 2022-04-28 PROCEDURE — 6370000000 HC RX 637 (ALT 250 FOR IP): Performed by: NURSE PRACTITIONER

## 2022-04-28 PROCEDURE — 85651 RBC SED RATE NONAUTOMATED: CPT

## 2022-04-28 PROCEDURE — 84550 ASSAY OF BLOOD/URIC ACID: CPT

## 2022-04-28 PROCEDURE — G0378 HOSPITAL OBSERVATION PER HR: HCPCS

## 2022-04-28 PROCEDURE — 96375 TX/PRO/DX INJ NEW DRUG ADDON: CPT

## 2022-04-28 PROCEDURE — 96376 TX/PRO/DX INJ SAME DRUG ADON: CPT

## 2022-04-28 PROCEDURE — APPSS30 APP SPLIT SHARED TIME 16-30 MINUTES: Performed by: NURSE PRACTITIONER

## 2022-04-28 PROCEDURE — 85027 COMPLETE CBC AUTOMATED: CPT

## 2022-04-28 RX ORDER — ONDANSETRON 8 MG/1
8 TABLET, ORALLY DISINTEGRATING ORAL EVERY 8 HOURS PRN
Qty: 12 TABLET | Refills: 0 | Status: SHIPPED | OUTPATIENT
Start: 2022-04-28 | End: 2022-06-09

## 2022-04-28 RX ORDER — ACETAMINOPHEN 500 MG
1000 TABLET ORAL 3 TIMES DAILY
Qty: 360 TABLET | Refills: 1 | COMMUNITY
Start: 2022-04-28 | End: 2022-09-22 | Stop reason: ALTCHOICE

## 2022-04-28 RX ORDER — OXYCODONE HYDROCHLORIDE 5 MG/1
5 TABLET ORAL EVERY 6 HOURS PRN
Qty: 28 TABLET | Refills: 0 | Status: SHIPPED | OUTPATIENT
Start: 2022-04-28 | End: 2022-05-05

## 2022-04-28 RX ORDER — ACETAMINOPHEN 500 MG
1000 TABLET ORAL EVERY 8 HOURS SCHEDULED
Status: DISCONTINUED | OUTPATIENT
Start: 2022-04-28 | End: 2022-04-29 | Stop reason: HOSPADM

## 2022-04-28 RX ORDER — OXYCODONE HYDROCHLORIDE 5 MG/1
5 TABLET ORAL EVERY 4 HOURS PRN
Status: DISCONTINUED | OUTPATIENT
Start: 2022-04-28 | End: 2022-04-29 | Stop reason: HOSPADM

## 2022-04-28 RX ORDER — KETOROLAC TROMETHAMINE 30 MG/ML
15 INJECTION, SOLUTION INTRAMUSCULAR; INTRAVENOUS EVERY 6 HOURS
Status: DISCONTINUED | OUTPATIENT
Start: 2022-04-28 | End: 2022-04-29 | Stop reason: HOSPADM

## 2022-04-28 RX ORDER — IBUPROFEN 800 MG/1
800 TABLET ORAL EVERY 8 HOURS
Qty: 21 TABLET | Refills: 0 | Status: SHIPPED | OUTPATIENT
Start: 2022-04-28 | End: 2022-09-22 | Stop reason: ALTCHOICE

## 2022-04-28 RX ADMIN — Medication 10 ML: at 01:35

## 2022-04-28 RX ADMIN — ACETAMINOPHEN 1000 MG: 500 TABLET ORAL at 13:18

## 2022-04-28 RX ADMIN — Medication 10 ML: at 20:12

## 2022-04-28 RX ADMIN — MORPHINE SULFATE 1 MG: 2 INJECTION, SOLUTION INTRAMUSCULAR; INTRAVENOUS at 20:12

## 2022-04-28 RX ADMIN — ENOXAPARIN SODIUM 40 MG: 100 INJECTION SUBCUTANEOUS at 08:36

## 2022-04-28 RX ADMIN — KETOROLAC TROMETHAMINE 15 MG: 30 INJECTION, SOLUTION INTRAMUSCULAR at 08:35

## 2022-04-28 RX ADMIN — OXYCODONE 5 MG: 5 TABLET ORAL at 18:14

## 2022-04-28 RX ADMIN — KETOROLAC TROMETHAMINE 15 MG: 30 INJECTION, SOLUTION INTRAMUSCULAR at 21:43

## 2022-04-28 RX ADMIN — KETOROLAC TROMETHAMINE 15 MG: 30 INJECTION, SOLUTION INTRAMUSCULAR at 14:47

## 2022-04-28 RX ADMIN — Medication 10 ML: at 08:36

## 2022-04-28 ASSESSMENT — PAIN SCALES - GENERAL
PAINLEVEL_OUTOF10: 10
PAINLEVEL_OUTOF10: 2
PAINLEVEL_OUTOF10: 2
PAINLEVEL_OUTOF10: 10
PAINLEVEL_OUTOF10: 8
PAINLEVEL_OUTOF10: 7
PAINLEVEL_OUTOF10: 10
PAINLEVEL_OUTOF10: 3

## 2022-04-28 ASSESSMENT — PAIN DESCRIPTION - DESCRIPTORS
DESCRIPTORS: ACHING
DESCRIPTORS: ACHING;DISCOMFORT
DESCRIPTORS: ACHING

## 2022-04-28 ASSESSMENT — PAIN DESCRIPTION - PAIN TYPE
TYPE: ACUTE PAIN

## 2022-04-28 ASSESSMENT — PAIN DESCRIPTION - ORIENTATION
ORIENTATION: LEFT

## 2022-04-28 ASSESSMENT — PAIN - FUNCTIONAL ASSESSMENT
PAIN_FUNCTIONAL_ASSESSMENT: PREVENTS OR INTERFERES WITH MANY ACTIVE NOT PASSIVE ACTIVITIES
PAIN_FUNCTIONAL_ASSESSMENT: PREVENTS OR INTERFERES WITH MANY ACTIVE NOT PASSIVE ACTIVITIES
PAIN_FUNCTIONAL_ASSESSMENT: PREVENTS OR INTERFERES WITH ALL ACTIVE AND SOME PASSIVE ACTIVITIES

## 2022-04-28 ASSESSMENT — PAIN DESCRIPTION - FREQUENCY
FREQUENCY: CONTINUOUS
FREQUENCY: CONTINUOUS

## 2022-04-28 ASSESSMENT — PAIN DESCRIPTION - LOCATION
LOCATION: KNEE

## 2022-04-28 ASSESSMENT — PAIN DESCRIPTION - ONSET
ONSET: ON-GOING
ONSET: ON-GOING

## 2022-04-28 NOTE — PROGRESS NOTES
Department of Surgery   Surgical Service- Orthopaedics  Physician Assistant  Progress Note      María Ram  4/28/2022  2:10 PM    SUBJECTIVE: Patient continues to have left knee pain radiating into thigh and buttocks, denies low back pain or LLE paresthesias. States pain remains the same. Morphine and Toradol ordered for pain. She states that she still feels feverish and nauseous. Seen by PT today. Denies history of gout or rheumatologic disease. States that she has \"bone spurs\" in her toes/feet. Denies every having other areas of joint pain or specifically swelling,warmth, redness in great toes or other joints. OBJECTIVE:      VITALS:  BP 89/61   Pulse 81   Temp 97.5 °F (36.4 °C) (Oral)   Resp 18   Ht 5' 11\" (1.803 m)   Wt 168 lb 9.6 oz (76.5 kg)   SpO2 100%   BMI 23.51 kg/m²     Physical Exam: Mild swelling at knee, no excessive warmth, lateral knee is cold due to ice pack against it. No erythema. Patient rest with knee flexed, pain with any attempted PROM or AROM. No calf TTP or LE edema. 2+ DP and PT pulses.      Data:  CBC:   Lab Results   Component Value Date    WBC 4.7 04/28/2022    RBC 3.80 04/28/2022    HGB 11.0 04/28/2022    HCT 33.7 04/28/2022    MCV 88.7 04/28/2022    MCH 28.9 04/28/2022    MCHC 32.6 04/28/2022    RDW 14.1 04/28/2022     04/28/2022    MPV 8.8 04/28/2022     BMP:    Lab Results   Component Value Date     04/28/2022    K 3.8 04/28/2022    K 3.9 03/30/2022     04/28/2022    CO2 27 04/28/2022    BUN 6 04/28/2022    LABALBU 4.1 04/26/2022    CREATININE 0.7 04/28/2022    CALCIUM 9.7 04/28/2022    GFRAA >60 04/28/2022    LABGLOM >60 04/28/2022    GLUCOSE 92 04/28/2022     Uric Acid:    Lab Results   Component Value Date    LABURIC 7.0 04/28/2022 4-28-22 ESR 40  Blood cultures x 2 prelim results no growth for 24 hrs  Synovial fluid culture prelim results no growth  Synovial fluid crystals Negative      ASSESSMENT: Acute left knee pain and effusion- suspect inflammatory or possibly gouty arthropathy    PLAN:   Recent labs and culture results reviewed  Based on synovial fluid cell count there is a low probability that symptoms are due to septic arthritis, most recent synovial fluid gram stain and culture results are so far negative. She may be having symptoms from a gouty arthropathy given slight elevation of uric acid although serum uric acid is not fully diagnostic of gout and also synovial fluid crystal analysis was negative. Will await final culture results and continue to follow. Continue pain medications as ordered and PT efforts.      [employer] SOFIYA

## 2022-04-28 NOTE — PROGRESS NOTES
P Quality Flow/Interdisciplinary Rounds Progress Note        Quality Flow Rounds held on April 28, 2022    Disciplines Attending:  Bedside Nurse, ,  and Nursing Unit Leadership    Susana Martinez was admitted on 4/26/2022  6:24 PM    Anticipated Discharge Date:  Expected Discharge Date: 04/30/22    Disposition:    Andrea Score:  Andrea Scale Score: 20    Readmission Risk              Risk of Unplanned Readmission:  0           Discussed patient goal for the day, patient clinical progression, and barriers to discharge. The following Goal(s) of the Day/Commitment(s) have been identified: Awaiting final cultures, pain control, and discharge planning home.       Mariela Neil RN  April 28, 2022

## 2022-04-28 NOTE — PROGRESS NOTES
Physical Therapy  Facility/Department: [facility] MED SURG  Physical Therapy Initial Assessment    Name: Melissa Tobar  : 1980  MRN: [id number]  Date of Service: 2022      Patient Diagnosis(es): The encounter diagnosis was Acute pain of left knee. Past Medical History:  has a past medical history of Anxiety and depression, Breast disorder, Cancer ([facility] [address] Cervical cancer ([facility] [address] Depression, Eclampsia, Herpes simplex virus (HSV) infection, Hypothyroid, Mental disorder, Seizures ([facility] [address] and Thyroid disease. Past Surgical History:  has a past surgical history that includes Leg Surgery (Right); Inner ear surgery (Left); Cervix surgery; Breast lumpectomy (Bilateral); and Dilation and curettage of uterus. Requires PT Follow-Up: Yes     Evaluating Therapist: Nicholas Garcia PT     rec ww     Referring Provider:        Blade Martinez DO         PT order : PT eval and treat     Room #: 357   DIAGNOSIS:  Septic arthritis, L knee effusion   PRECAUTIONS: falls     Social:  Pt lives with  4 y. o.son  in a 1  floor plan  3  steps and  1  rails to enter. Prior to admission pt walked with  No AD, independent      Initial Evaluation  Date:  2022 Treatment      Short Term/ Long Term   Goals   Was pt agreeable to Eval/treatment? Yes      Does pt have pain?  severe L knee pain, has been medicated.    5/10 at rest, 10/10 after mobility      Bed Mobility  Rolling:  Independent   Supine to sit:  S/I   Sit to supine:  S/I   Scooting:  S/I    independent    Transfers Sit to stand:  CGA   Stand to sit:  CGA   Stand pivot:  CGA    independent    Ambulation    7  Feet  2  with  ww  with  SBA/CGA    100  feet with least restrictive device  with independent        Stair negotiation: ascended and descended NT    4  steps with  1  rail with  Independent    LE ROM  Decreased L knee ext ( limited by at least 15 degrees) , flex WFL,otherwise WFL  L knee ext  -5 degrees    LE strength  2-/ 5 L knee   3-/ 5    AM- PAC RAW score   Pt is alert and Oriented x  4      Balance:  CGA . Fall risk due to  Pain and inability to bear weight   Endurance: limited by pain   Bed/Chair alarm:  No      ASSESSMENT  Pt displays functional ability as noted in the objective portion of this evaluation. Conditions Requiring Skilled Therapeutic Intervention:    [x]Decreased strength     [x]Decreased ROM  [x]Decreased functional mobility  [x]Decreased balance   [x]Decreased endurance   [x]Decreased posture  []Decreased sensation  []Decreased coordination   []Decreased vision  []Decreased safety awareness   [x]Increased pain         Treatment/Education:    Pt in bed  upon arrival ; agreeable to PT. Mobility as above. Slow with all mobility due to pain. Pt reports increased L knee pain with mobility and knee extension and states pain shoots into buttocks. Unable to bear weight on L foot and gait distance limited by pain, unable to walk into bathroom due to pain and ended up using BSC. Instructed in hand and foot placement with transfers and sequencing with gait when attempting to bear weight on L LE. Amb with flexed trunk posture     Pt educated on fall risk,  Safety with mobility        Patient response to education:   Pt verbalized understanding Pt demonstrated skill Pt requires further education in this area    x With cues  x       Comments:  Pt left  In bed per pt request after session, with call light in reach. Rehab potential is Good for reaching above PT goals. Pts/ family goals   1. Decreased pain     Patient and or family understand(s) diagnosis, prognosis, and plan of care. -  Yes     PLAN  PT care will be provided in accordance with the objectives noted above. Whenever appropriate, clear delegation orders will be provided for nursing staff. Exercises and functional mobility practice will be used as well as appropriate assistive devices or modalities to obtain goals.  Patient and family education will also be administered as needed. PLAN OF CARE:    Current Treatment Recommendations     [x] Strengthening to improve independence with functional mobility   [] ROM to improve independence with functional mobility   [x] Balance Training to improve static/dynamic balance and to reduce fall risk  [x] Endurance Training to improve activity tolerance during functional mobility   [x] Transfer Training to improve safety and independence with all functional transfers   [x] Gait Training to improve gait mechanics, endurance and assess need for appropriate assistive device  [x] Stair Training in preparation for safe discharge home and/or into the community   [x] Positioning to prevent skin breakdown and contractures  [x] Safety and Education Training   [x] Patient/Caregiver Education   [] HEP  [] Other     Frequency of treatments will be 2-5x/week x 7 days. Time in: 0955   Time out: 1014       Evaluation Time includes thorough review of current medical information, gathering information on past medical history/social history and prior level of function, completion of standardized testing/informal observation of tasks, assessment of data and education on plan of care and goals.     CPT codes:  [x] Low Complexity PT evaluation 74779  [] Moderate Complexity PT evaluation 60544  [] High Complexity PT evaluation 40075  [] PT Re-evaluation 82662  [] Gait training 31175  minutes  [] Therapeutic activities 56883  minutes  [] Therapeutic exercises 12825  minutes  [] Neuromuscular reeducation 02579  minutes       [address] number:  PT 5197

## 2022-04-28 NOTE — PROGRESS NOTES
Brought pt down for her knee MRI exam. Pt states she was pre-medicated for pain. Per pt She is unable to lay on her back or straighten her knee enough to be able to fit in our cameras. Kathrin Britt, her RN , to inform. RN states pt is able to walk to bathroom. This will need to be discussed betwn pt and Dr to see if it will be able to be done and her pain managed.

## 2022-04-28 NOTE — CARE COORDINATION
Social work / Discharge Planning:       Initial assessment completed by PAZ yesterday. Awaiting PT/OT evaluations. ID was consulted and no antibiotics ordered. Social work will follow.    Electronically signed by MALCOM Coleman on 4/28/2022 at 8:17 AM

## 2022-04-28 NOTE — PROGRESS NOTES
[address] [place] [address] [facility]  [facility]  Progress Note    SUBJECTIVE:  Chief Complaint   Patient presents with    Leg Pain     left leg. shooting pain up leg into buttocks     Patient is tolerating medications. No reported adverse drug reactions. No nausea, vomiting, diarrhea. Left knee pain unchanged however managed with medications. Review of systems:  As stated above in the chief complaint, otherwise negative. Medications:  Scheduled Meds:   sodium chloride flush  5-40 mL IntraVENous 2 times per day    enoxaparin  40 mg SubCUTAneous Daily     Continuous Infusions:   sodium chloride       PRN Meds:sodium chloride flush, sodium chloride, ondansetron **OR** ondansetron, polyethylene glycol, acetaminophen **OR** acetaminophen, morphine, ketorolac    OBJECTIVE:  BP 89/61   Pulse 81   Temp 97.5 °F (36.4 °C) (Oral)   Resp 18   Ht 5' 11\" (1.803 m)   Wt 168 lb 9.6 oz (76.5 kg)   SpO2 100%   BMI 23.51 kg/m²   Temp  Av.8 °F (36.6 °C)  Min: 97.5 °F (36.4 °C)  Max: 98.1 °F (36.7 °C)  Constitutional: The patient is awake, alert, and oriented. Resting in bed  Skin: Warm and dry. No rashes were noted. HEENT: Round and reactive pupils. Moist mucous membranes. No ulcerations or thrush. Neck: Supple to movements. Chest: No use of accessory muscles to breathe. Symmetrical expansion. No wheezing, crackles or rhonchi. Cardiovascular: S1 and S2 are rhythmic and regular. No murmurs appreciated. Abdomen: Positive bowel sounds to auscultation. Benign to palpation. No masses felt. No hepatosplenomegaly. Extremities: No clubbing, no cyanosis, no edema.  Left knee swelling, decreased ROM  Lines: peripheral    Laboratory and Tests Review:  Lab Results   Component Value Date    WBC 4.7 2022    WBC 5.0 2022    WBC 4.4 (L) 2022    HGB 11.0 (L) 2022    HCT 33.7 (L) 2022    MCV 88.7 2022     2022     Lab Results   Component Value Date    NEUTROABS 1.79 (L) 04/26/2022    NEUTROABS 1.97 03/30/2022    NEUTROABS 2.77 03/12/2019     No results found for: CRPHS  Lab Results   Component Value Date    ALT 20 04/26/2022    AST 26 04/26/2022    ALKPHOS 59 04/26/2022    BILITOT 0.5 04/26/2022     Lab Results   Component Value Date     04/28/2022    K 3.8 04/28/2022    K 3.9 03/30/2022     04/28/2022    CO2 27 04/28/2022    BUN 6 04/28/2022    CREATININE 0.7 04/28/2022    CREATININE 0.7 04/27/2022    CREATININE 0.7 04/26/2022    GFRAA >60 04/28/2022    LABGLOM >60 04/28/2022    GLUCOSE 92 04/28/2022    PROT 7.3 04/26/2022    LABALBU 4.1 04/26/2022    CALCIUM 9.7 04/28/2022    BILITOT 0.5 04/26/2022    ALKPHOS 59 04/26/2022    AST 26 04/26/2022    ALT 20 04/26/2022     Lab Results   Component Value Date    CRP 1.4 (H) 04/26/2022     Lab Results   Component Value Date    SEDRATE 40 (H) 04/28/2022    SEDRATE 38 (H) 04/26/2022     Radiology:      Microbiology:     Blood cultures 4/16/22 NGTD  Body fluid synovial No organisms seen     Assessment:  · Inflammatory arthritis left knee. Doubt infectious given the low cell count. · Leukopenia     Plan:    · Antibiotics are not warranted  · Check cultures,   · baseline ESR 38, CRP 1.4  · Supportive treatment  · Check crystals in synovial fluid-none  · Uric acid 7  · Will follow with you      Hernandez Galloway, APRN - CNP  9:50 AM  4/28/2022     Patient seen and examined. I had a face to face encounter with the patient. Agree with exam.  Assessment and plan as outlined above and directed by me. Addition and corrections were done as deemed appropriate. My exam and plan include: the patient has she can be discharged from understanding. Spoke with nursing inflammatory arthritis. Synovial fluid cultures are negative. She is off antibiotics.     Ashely Calles MD  4/28/2022  3:05 PM

## 2022-04-28 NOTE — PROGRESS NOTES
[facility] Progress Note    Admitting Date and Time: 4/26/2022  6:24 PM  Admit Dx: Septic arthritis ([facility] [address] [M00.9]  Effusion of left knee joint [M25.462]  Acute pain of left knee [M25.562]      Assessment:    Principal Problem:    Effusion of left knee joint  Active Problems:    Inflammatory arthritis    Difficulty walking  Resolved Problems:    Septic arthritis ([address]      Plan:  1. Left knee effusion  - gram stain negative for growth or organisms thus far, culture negative at 48 hours. Fluid analysis negative for crystals. Does not seem to be c/w gout or CPPD, nor septic arthritis. Has been getting Toradol, ES Tylenol and requiring Morphine  Unable to extend knee due to severity of pain. Will check MRI to assess any possible quadriceps tendonopathy , or less likely bucket handle meniscal tear. Continue Tylenol, NSAIDS and PRN morphine      VTE prophylaxis: Lovenox    Subjective:  Patient is being followed for Septic arthritis ([address] [M00.9]  Effusion of left knee joint [M25.462]  Acute pain of left knee [M25.562]     Still with significant left knee pain, states unable to extend the joint due to severity of pain. She was not able to bear much weight with PT and had to hop with use of walker    ROS: denies fever, chills, cp, sob, n/v, HA unless stated above.  acetaminophen  1,000 mg Oral 3 times per day    ketorolac  15 mg IntraVENous Q6H    sodium chloride flush  5-40 mL IntraVENous 2 times per day    enoxaparin  40 mg SubCUTAneous Daily     oxyCODONE, 5 mg, Q4H PRN  sodium chloride flush, 5-40 mL, PRN  sodium chloride, , PRN  ondansetron, 4 mg, Q8H PRN   Or  ondansetron, 4 mg, Q6H PRN  polyethylene glycol, 17 g, Daily PRN  morphine, 1 mg, Q3H PRN         Objective:    BP 89/61   Pulse 81   Temp 97.5 °F (36.4 °C) (Oral)   Resp 18   Ht 5' 11\" (1.803 m)   Wt 168 lb 9.6 oz (76.5 kg)   SpO2 100%   BMI 23.51 kg/m²     General Appearance: in moderate pain left knee.   Skin: warm and dry  Head: normocephalic and atraumatic  Eyes: pupils equal, round, and reactive to light, extraocular eye movements intact, conjunctivae normal  Neck: neck supple and non tender without mass   Pulmonary/Chest: clear to auscultation bilaterally- no wheezes, rales or rhonchi, normal air movement, no respiratory distress  Cardiovascular: normal rate, normal S1 and S2 and no carotid bruits  Abdomen: soft, non-tender, non-distended, normal bowel sounds, no masses or organomegaly  Extremities: left knee suprapatellar discomfort, unable to extend joint. No erythema or warmth. Neurologic: no cranial nerve deficit and speech normal        Recent Labs     04/26/22 1918 04/27/22  0940 04/28/22  0414    139 138   K 3.8 4.2 3.8    103 102   CO2 26 28 27   BUN 5* 6 6   CREATININE 0.7 0.7 0.7   GLUCOSE 86 94 92   CALCIUM 9.4 9.1 9.7       Recent Labs     04/26/22 1918 04/27/22  0940 04/28/22  0414   WBC 4.4* 5.0 4.7   RBC 3.60 3.63 3.80   HGB 10.4* 10.6* 11.0*   HCT 32.5* 32.8* 33.7*   MCV 90.3 90.4 88.7   MCH 28.9 29.2 28.9   MCHC 32.0 32.3 32.6   RDW 14.3 14.4 14.1    355 379   MPV 9.1 8.9 8.8       Radiology:     NOTE: This report was transcribed using voice recognition software. Every effort was made to ensure accuracy; however, inadvertent computerized transcription errors may be present.   Electronically signed by CARYL Prado CNP on 4/28/2022 at 3:58 PM

## 2022-04-29 ENCOUNTER — APPOINTMENT (OUTPATIENT)
Dept: MRI IMAGING | Age: 42
End: 2022-04-29
Payer: MEDICARE

## 2022-04-29 VITALS
OXYGEN SATURATION: 100 % | HEART RATE: 82 BPM | SYSTOLIC BLOOD PRESSURE: 103 MMHG | DIASTOLIC BLOOD PRESSURE: 62 MMHG | BODY MASS INDEX: 23.6 KG/M2 | TEMPERATURE: 98.3 F | HEIGHT: 71 IN | RESPIRATION RATE: 16 BRPM | WEIGHT: 168.6 LBS

## 2022-04-29 PROBLEM — M94.262 CHONDROMALACIA OF KNEE, LEFT: Status: ACTIVE | Noted: 2022-04-29

## 2022-04-29 LAB
ANION GAP SERPL CALCULATED.3IONS-SCNC: 11 MMOL/L (ref 7–16)
BUN BLDV-MCNC: 7 MG/DL (ref 6–20)
C. TRACHOMATIS DNA ,URINE: NEGATIVE
CALCIUM SERPL-MCNC: 9.4 MG/DL (ref 8.6–10.2)
CHLORIDE BLD-SCNC: 102 MMOL/L (ref 98–107)
CO2: 25 MMOL/L (ref 22–29)
CREAT SERPL-MCNC: 0.8 MG/DL (ref 0.5–1)
GFR AFRICAN AMERICAN: >60
GFR NON-AFRICAN AMERICAN: >60 ML/MIN/1.73
GLUCOSE BLD-MCNC: 96 MG/DL (ref 74–99)
HCT VFR BLD CALC: 32.1 % (ref 34–48)
HEMOGLOBIN: 10.5 G/DL (ref 11.5–15.5)
MCH RBC QN AUTO: 29.2 PG (ref 26–35)
MCHC RBC AUTO-ENTMCNC: 32.7 % (ref 32–34.5)
MCV RBC AUTO: 89.4 FL (ref 80–99.9)
N. GONORRHOEAE DNA, URINE: NEGATIVE
PDW BLD-RTO: 14.1 FL (ref 11.5–15)
PLATELET # BLD: 363 E9/L (ref 130–450)
PMV BLD AUTO: 9 FL (ref 7–12)
POTASSIUM SERPL-SCNC: 3.7 MMOL/L (ref 3.5–5)
RBC # BLD: 3.59 E12/L (ref 3.5–5.5)
SODIUM BLD-SCNC: 138 MMOL/L (ref 132–146)
SOURCE: NORMAL
WBC # BLD: 4.6 E9/L (ref 4.5–11.5)

## 2022-04-29 PROCEDURE — 85027 COMPLETE CBC AUTOMATED: CPT

## 2022-04-29 PROCEDURE — 2580000003 HC RX 258: Performed by: INTERNAL MEDICINE

## 2022-04-29 PROCEDURE — 6360000002 HC RX W HCPCS: Performed by: NURSE PRACTITIONER

## 2022-04-29 PROCEDURE — 36415 COLL VENOUS BLD VENIPUNCTURE: CPT

## 2022-04-29 PROCEDURE — G0378 HOSPITAL OBSERVATION PER HR: HCPCS

## 2022-04-29 PROCEDURE — 6370000000 HC RX 637 (ALT 250 FOR IP): Performed by: NURSE PRACTITIONER

## 2022-04-29 PROCEDURE — 99217 PR OBSERVATION CARE DISCHARGE MANAGEMENT: CPT | Performed by: FAMILY MEDICINE

## 2022-04-29 PROCEDURE — 6360000002 HC RX W HCPCS: Performed by: INTERNAL MEDICINE

## 2022-04-29 PROCEDURE — 96372 THER/PROPH/DIAG INJ SC/IM: CPT

## 2022-04-29 PROCEDURE — 96376 TX/PRO/DX INJ SAME DRUG ADON: CPT

## 2022-04-29 PROCEDURE — 80048 BASIC METABOLIC PNL TOTAL CA: CPT

## 2022-04-29 PROCEDURE — APPSS30 APP SPLIT SHARED TIME 16-30 MINUTES: Performed by: NURSE PRACTITIONER

## 2022-04-29 PROCEDURE — 97165 OT EVAL LOW COMPLEX 30 MIN: CPT

## 2022-04-29 PROCEDURE — 73721 MRI JNT OF LWR EXTRE W/O DYE: CPT

## 2022-04-29 RX ADMIN — KETOROLAC TROMETHAMINE 15 MG: 30 INJECTION, SOLUTION INTRAMUSCULAR at 08:53

## 2022-04-29 RX ADMIN — Medication 10 ML: at 08:53

## 2022-04-29 RX ADMIN — OXYCODONE 5 MG: 5 TABLET ORAL at 11:16

## 2022-04-29 RX ADMIN — KETOROLAC TROMETHAMINE 15 MG: 30 INJECTION, SOLUTION INTRAMUSCULAR at 17:13

## 2022-04-29 RX ADMIN — ACETAMINOPHEN 1000 MG: 500 TABLET ORAL at 13:38

## 2022-04-29 RX ADMIN — SODIUM CHLORIDE, PRESERVATIVE FREE 10 ML: 5 INJECTION INTRAVENOUS at 13:40

## 2022-04-29 RX ADMIN — MORPHINE SULFATE 1 MG: 2 INJECTION, SOLUTION INTRAMUSCULAR; INTRAVENOUS at 08:55

## 2022-04-29 RX ADMIN — ENOXAPARIN SODIUM 40 MG: 100 INJECTION SUBCUTANEOUS at 08:52

## 2022-04-29 RX ADMIN — MORPHINE SULFATE 1 MG: 2 INJECTION, SOLUTION INTRAMUSCULAR; INTRAVENOUS at 02:05

## 2022-04-29 RX ADMIN — KETOROLAC TROMETHAMINE 15 MG: 30 INJECTION, SOLUTION INTRAMUSCULAR at 04:09

## 2022-04-29 RX ADMIN — ACETAMINOPHEN 1000 MG: 500 TABLET ORAL at 06:18

## 2022-04-29 RX ADMIN — MORPHINE SULFATE 1 MG: 2 INJECTION, SOLUTION INTRAMUSCULAR; INTRAVENOUS at 13:38

## 2022-04-29 ASSESSMENT — PAIN DESCRIPTION - LOCATION
LOCATION: OTHER (COMMENT)
LOCATION: LEG

## 2022-04-29 ASSESSMENT — PAIN SCALES - GENERAL
PAINLEVEL_OUTOF10: 8
PAINLEVEL_OUTOF10: 7
PAINLEVEL_OUTOF10: 8

## 2022-04-29 ASSESSMENT — PAIN DESCRIPTION - DESCRIPTORS: DESCRIPTORS: ACHING;CRAMPING;SHARP

## 2022-04-29 ASSESSMENT — PAIN DESCRIPTION - ORIENTATION: ORIENTATION: LEFT

## 2022-04-29 NOTE — PROGRESS NOTES
[address] [place] [address] [facility]  [facility]  Progress Note    SUBJECTIVE:  Chief Complaint   Patient presents with    Leg Pain     left leg. shooting pain up leg into buttocks     Patient is tolerating medications. No reported adverse drug reactions. No nausea, vomiting, diarrhea. Unable to complete MRI due to pain. No fevers     Review of systems:  As stated above in the chief complaint, otherwise negative. Medications:  Scheduled Meds:   acetaminophen  1,000 mg Oral 3 times per day    ketorolac  15 mg IntraVENous Q6H    sodium chloride flush  5-40 mL IntraVENous 2 times per day    enoxaparin  40 mg SubCUTAneous Daily     Continuous Infusions:   sodium chloride       PRN Meds:oxyCODONE, sodium chloride flush, sodium chloride, ondansetron **OR** ondansetron, polyethylene glycol, morphine    OBJECTIVE:  /62   Pulse 82   Temp 98.3 °F (36.8 °C) (Oral)   Resp 16   Ht 5' 11\" (1.803 m)   Wt 168 lb 9.6 oz (76.5 kg)   SpO2 100%   BMI 23.51 kg/m²   Temp  Av.1 °F (36.7 °C)  Min: 97.3 °F (36.3 °C)  Max: 99.3 °F (37.4 °C)  Constitutional: The patient is awake, alert, and oriented. Resting in bed. Apathetic. Skin: Warm and dry. No rashes were noted. HEENT: Round and reactive pupils. Moist mucous membranes. No ulcerations or thrush. Neck: Supple to movements. Chest: No use of accessory muscles to breathe. Symmetrical expansion. No wheezing, crackles or rhonchi. Cardiovascular: S1 and S2 are rhythmic and regular. No murmurs appreciated. Abdomen: Positive bowel sounds to auscultation. Benign to palpation. No masses felt. Extremities: Left knee swelling, decreased ROM +pain.    Lines: peripheral    Laboratory and Tests Review:  Lab Results   Component Value Date    WBC 4.6 2022    WBC 4.7 2022    WBC 5.0 2022    HGB 10.5 (L) 2022    HCT 32.1 (L) 2022    MCV 89.4 2022     2022     Lab Results   Component Value Date    NEUTROABS 1.79 (L) 04/26/2022    NEUTROABS 1.97 03/30/2022    NEUTROABS 2.77 03/12/2019     No results found for: CRPHS  Lab Results   Component Value Date    ALT 20 04/26/2022    AST 26 04/26/2022    ALKPHOS 59 04/26/2022    BILITOT 0.5 04/26/2022     Lab Results   Component Value Date     04/29/2022    K 3.7 04/29/2022    K 3.9 03/30/2022     04/29/2022    CO2 25 04/29/2022    BUN 7 04/29/2022    CREATININE 0.8 04/29/2022    CREATININE 0.7 04/28/2022    CREATININE 0.7 04/27/2022    GFRAA >60 04/29/2022    LABGLOM >60 04/29/2022    GLUCOSE 96 04/29/2022    PROT 7.3 04/26/2022    LABALBU 4.1 04/26/2022    CALCIUM 9.4 04/29/2022    BILITOT 0.5 04/26/2022    ALKPHOS 59 04/26/2022    AST 26 04/26/2022    ALT 20 04/26/2022     Lab Results   Component Value Date    CRP 1.4 (H) 04/26/2022     Lab Results   Component Value Date    SEDRATE 40 (H) 04/28/2022    SEDRATE 38 (H) 04/26/2022     Radiology:  Reviewed     Microbiology:   Blood cultures 4/16/22 NGTD  Body fluid synovial No organisms seen, no crystals   Urine for chlamydia & gonorrhea: negative      Assessment:  · Inflammatory arthritis left knee. Doubt infectious given the low cell count. · Leukopenia, resolved     Plan:    · Antibiotics are not warranted  · Supportive treatment  · Unable to complete MRI due to pain; has been up walking in room however    · Labs reviewed     CARYL Shen CNP  10:36 AM  4/29/2022     Patient seen and examined. I had a face to face encounter with the patient. Agree with exam.  Assessment and plan as outlined above and directed by me. Addition and corrections were done as deemed appropriate. My exam and plan include: Thus far no evidence of ongoing infection. ID will sign off. Call if needed.     Harlan Mesa MD  4/29/2022  12:56 PM

## 2022-04-29 NOTE — PLAN OF CARE
Problem: Discharge Planning  Goal: Discharge to home or other facility with appropriate resources  Outcome: Adequate for Discharge     Problem: Pain  Goal: Verbalizes/displays adequate comfort level or baseline comfort level  Outcome: Adequate for Discharge     Problem: Safety - Adult  Goal: Free from fall injury  Outcome: Adequate for Discharge     Problem: ABCDS Injury Assessment  Goal: Absence of physical injury  Outcome: Adequate for Discharge

## 2022-04-29 NOTE — PROGRESS NOTES
MRI performed today of left knee    MRI reports and images reviewed and shows severe chondromalacia of patella as well as tricompartmental arthritic change, medial trochlear hypoplasia, menisci intact, cruciates intact, joint effusion, patella michael    4-29-22   CBC WBC normal at 4.6    Knee aspiration/synovial fluid culture preliminary no growth incubation continues    Synovial fluid crystal analysis negative    Blood cultures x 2 prelim no growth for 24 hrs          Left knee pain and effusion  Left knee OA, chondromalacia patella    MRI performed today and shows findings consistent with effusion and arthritic change within knee. No acute structural abnormalities seen. It is suggested that the patient be placed on NSAIDS for pain, ice, rest, and participate in outpatient PT for initiation of ROM, strengthening, and mobilization. Will sign off on patient case at this time.     [employer] SOFIYA

## 2022-04-29 NOTE — DISCHARGE SUMMARY
[facility] Physician Discharge Summary       [facility]) Physicians Pre-Service  [phone]  Schedule an appointment as soon as possible for a visit in 1 week  to establish with primary care provider    Darnell Vaughn MD  [facility] [phone]    In 1 week  for hospital follow up      Activity level: As tolerated    Dispo: Home    Condition on discharge: Stable     Patient ID:  Darlene Silverio  [id number]  39 y.o.  1980    Admit date: 4/26/2022    Discharge date and time:  4/29/2022  4:40 PM    Admission Diagnoses: Principal Problem:    Effusion of left knee joint  Active Problems:    Inflammatory arthritis    Difficulty walking    Acute pain of left knee  Resolved Problems:    Septic arthritis [facility])      Discharge Diagnoses: Principal Problem:    Effusion of left knee joint  Active Problems:    Inflammatory arthritis    Difficulty walking    Acute pain of left knee  Resolved Problems:    Septic arthritis ([facility] [address]      Consults:  IP CONSULT TO ORTHOPEDIC SURGERY  IP CONSULT TO INFECTIOUS DISEASES  IP CONSULT TO IV TEAM  IP CONSULT TO IV TEAM  IP CONSULT TO IV TEAM    Hospital Course:   Patient Darlene Silverio is a 39 y.o. presented with Septic arthritis ([facility] [address] [M00.9]  Effusion of left knee joint [M25.462]  Acute pain of left knee [C97058]    39year old female who presented to ED with complaint of severe, progressively worsening left knee pain. Limiting ability to ambulate, and noting increased swelling. Had been taking Ibuprofen without relief    She was noted to have left knee effusion, was aspirated in ED and admitted for further pain control and orthopedics consult. Knee fluid negative for growth at time of discharge, > 48 hours incubation. No crystals were seen and low leukocyte count not consistent with infection. No antibiotics recommended. Treated with morphine, toradol and ES Tylenol.  Pain remained severe and limited ROM, unable to fully extend knee. MRI shows recurrence of large effusion and also severe OA changes with high grade chondromalacia patella and high grade cartilage fissures. Images reviewed by ortho and stated patient OK to discharge and will follow up with her in office. She was prescribed 800 mg Ibuprofen and Oxycodone PRN for pain management. Provided a walker. Recommended outpatient PT. Follow up with orthopedics in office in 1 week. Discharge Exam:    General Appearance: alert and oriented to person, place and time and in no acute distress  Skin: warm and dry  Head: normocephalic and atraumatic  Eyes: pupils equal, round, and reactive to light, extraocular eye movements intact, conjunctivae normal  Neck: neck supple and non tender without mass   Pulmonary/Chest: clear to auscultation bilaterally- no wheezes, rales or rhonchi, normal air movement, no respiratory distress  Cardiovascular: normal rate, normal S1 and S2 and no carotid bruits  Abdomen: soft, non-tender, non-distended, normal bowel sounds, no masses or organomegaly  Extremities: she can extend her knee to about 15-20 degrees and then resists any further attempt at ROM. Pain to medial aspect. Neurologic: no cranial nerve deficit and speech normal    I/O last 3 completed shifts: In: 250 [P.O.:240; I.V.:10]  Out: 1802 [Urine:1802]  No intake/output data recorded. LABS:  Recent Labs     04/27/22  0940 04/28/22 0414 04/29/22  0910    138 138   K 4.2 3.8 3.7    102 102   CO2 28 27 25   BUN 6 6 7   CREATININE 0.7 0.7 0.8   GLUCOSE 94 92 96   CALCIUM 9.1 9.7 9.4       Recent Labs     04/27/22  0940 04/28/22 0414 04/29/22  0910   WBC 5.0 4.7 4.6   RBC 3.63 3.80 3.59   HGB 10.6* 11.0* 10.5*   HCT 32.8* 33.7* 32.1*   MCV 90.4 88.7 89.4   MCH 29.2 28.9 29.2   MCHC 32.3 32.6 32.7   RDW 14.4 14.1 14.1    379 363   MPV 8.9 8.8 9.0       No results for input(s): POCGLU in the last 72 hours.     Imaging:  XR KNEE LEFT (3 VIEWS)    Addendum Date: 4/26/2022    ADDENDUM: Correction: Please note that there is suprapatella soft tissue swelling, compatible with joint effusion. Critical results were called by Dr. Kahlil Lux to referring physician on 4/26/2022 at 19:29. Result Date: 4/26/2022  EXAMINATION: THREE XRAY VIEWS OF THE LEFT KNEE 4/26/2022 7:14 pm COMPARISON: None. HISTORY: ORDERING SYSTEM PROVIDED HISTORY: pain severe, immobile, no injury TECHNOLOGIST PROVIDED HISTORY: Reason for exam:->pain severe, immobile, no injury FINDINGS: No evidence of acute fracture or dislocation. No focal osseous lesion. No evidence of joint effusion. There are mild arthritic changes. There is suprapatellar soft tissue swelling. No acute abnormality of the knee. Joint effusion. Patient Instructions:      Medication List      START taking these medications    acetaminophen 500 MG tablet  Commonly known as: TYLENOL  Take 2 tablets by mouth in the morning, at noon, and at bedtime Take Extra strength Tylenol  - 2 tabs orally 3 times daily for next 1 week     oxyCODONE 5 MG immediate release tablet  Commonly known as: Roxicodone  Take 1 tablet by mouth every 6 hours as needed for Pain for up to 7 days. Intended supply: 7 days.  Take lowest dose possible to manage pain        CHANGE how you take these medications    ibuprofen 800 MG tablet  Commonly known as: ADVIL;MOTRIN  Take 1 tablet by mouth every 8 hours for 7 days TAKE WITH FOOD  What changed: additional instructions        CONTINUE taking these medications    ondansetron 8 MG Tbdp disintegrating tablet  Commonly known as: Zofran ODT  Place 1 tablet under the tongue every 8 hours as needed for Nausea        STOP taking these medications    naproxen 500 MG tablet  Commonly known as: Naprosyn           Where to Get Your Medications      These medications were sent to [address] [place] [address] [phone]  [facility] [address]    Phone: [phone]   · ibuprofen 800 MG tablet  · ondansetron 8 MG Tbdp disintegrating tablet     You can get these medications from any pharmacy    Bring a paper prescription for each of these medications  · oxyCODONE 5 MG immediate release tablet  You don't need a prescription for these medications  · acetaminophen 500 MG tablet           Note that more than 30 minutes was spent in preparing discharge papers, discussing discharge with patient, medication review, etc.    Signed:  Electronically signed by CARYL Dyson CNP on 4/29/2022 at 4:40 PM

## 2022-04-29 NOTE — PROGRESS NOTES
Occupational Therapy  OCCUPATIONAL THERAPY INITIAL EVALUATION      Date:2022  Patient Name: Kisha Bynum  MRN: [id number]  : 1980  Room: [facility] Byrd [address] [place] [address]         Date:2022                                                  Patient Name: Kisha Bynum    MRN: [id number]    : 1980    Room: [address] [place] [address]      Evaluating OT: Jaida Sol OTR/L   [id number]      Referring Any Sheriff DO    Specific Provider Orders/Date:OT eval and treat 2022      Diagnosis:  Septic arthritis ([address] [M00.9]  Effusion of left knee joint [M25.462]  Acute pain of left knee [M25.562]     Pertinent Medical History: anxiety and depression, mental disorder   Precautions:  Fall Risk,      Assessment of current deficits    [x] Functional mobility  [x]ADLs  [x] Strength               [x]Cognition    [x] Functional transfers   [x] IADLs         [x] Safety Awareness   [x]Endurance    [] Fine Coordination              [x] Balance      [] Vision/perception   []Sensation     []Gross Motor Coordination  [] ROM  [] Delirium                   [] Motor Control     OT PLAN OF CARE   OT POC based on physician orders, patient diagnosis and results of clinical assessment    Frequency/Duration  2-4 days/wk for 2 weeks PRN   Specific OT Treatment Interventions to include:   ADL retraining/adapted techniques and AE recommendations to increase functional independence within precautions                    Energy conservation techniques to improve tolerance for selfcare routine   Functional transfer/mobility training/DME recommendations for increased independence, safety and fall prevention         Patient/family education to increase safety and functional independence             Environmental modifications for safe mobility and completion of ADLs                             Therapeutic activity to improve functional performance during ADLs. Therapeutic exercise to improve tolerance and functional strength for ADLs    Balance retraining/tolerance tasks for facilitation of postural control with dynamic challenges during ADLs .       Positioning to improve functional independence      Recommended Adaptive Equipment: TBD     Home Living: Pt lives with 3year old son, 1 story with 3 steps/rail   Prior Level of Function: Independent  with ADLs , Independent  with IADLs; ambulated with no device     Pain Level: L knee pain ;   Cognition: A&O: patient required encouragement to move OOB - attempted education on using walker to help alleviate some pain and pressure - patient stated \" Trino to high to remember\"   Memory:  Fair    Sequencing:  Fair    Problem solving:  Fair    Judgement/safety:  Fair      Functional Assessment:  AM-PAC Daily Activity Raw Score: 17/24   Initial Eval Status  Date: 4/29/22 Treatment Status  Date: STGs = LTGs  Time frame: 10-14 days   Feeding Independent      Grooming SBA/set-up ,seated  Decrease standing tolerance/balance   Mod I    UB Dressing SBA/set-up   Mod I    LB Dressing Mod A   Mod I    Bathing Mod A   Mod I    Toileting Min A  Mod I    Bed Mobility  SBA  Supine to sit   Sit-supine onto transport cart   Mod i   Functional Transfers CGA  Sit-stand from bed   Mod I    Functional Mobility CGA/SBA,w/walker   Ambulated from to transport cart in hallway     Patient mainly keeping weight off L LE   Mod i with good tolerance    Balance Sitting:     Static:  Supervision     Dynamic:Min A  Standing: CGA/SBA   Independent    Activity Tolerance Pain limiting tolerance     Good  with ADL activity    Visual/  Perceptual Glasses: no                 Hand Dominance right   AROM (PROM) Strength Additional Info:    RUE  WFL WFL good  and wfl FMC/dexterity noted during ADL tasks       [facility] WFL good  and wfl FMC/dexterity noted during ADL tasks Hearing: WFL   Sensation:  No c/o numbness or tingling  Tone: WFL   Edema: none observed    Comments: Upon arrival patient lying in bed . At end of session, patient transported to test  with call light and phone within reach, all lines and tubes intact. Overall patient demonstrated  decreased independence and safety during completion of ADL/functional transfer/mobility tasks. Pt would benefit from continued skilled OT to increase safety and independence with completion of ADL/IADL tasks for functional independence and quality of life. Rehab Potential: good for established goals     Patient / Family Goal: none stated       Patient and/or family were instructed on functional diagnosis, prognosis/goals and OT plan of care. Demonstrated fair  understanding. Eval Complexity: Low    Time In: 1100  Time Out: 1112      Min Units   OT Eval Low 97165 x  1   OT Eval Medium 52277      OT Eval High 07918      OT Re-Eval [id number]       Therapeutic Ex 77874       Therapeutic Activities 25941       ADL/Self Care 62670       Orthotic Management 61489       Manual 95817     Neuro Re-Ed 77666       Non-Billable Time          Evaluation Time additionally includes thorough review of current medical information, gathering information on past medical history/social history and prior level of function, interpretation of standardized testing/informal observation of tasks, assessment of data and development of plan of care and goals.             Bulmaro Ramirez  OTR/L  OT [id number]

## 2022-04-29 NOTE — PROGRESS NOTES
[facility] Progress Note    Admitting Date and Time: 4/26/2022  6:24 PM  Admit Dx: Septic arthritis ([facility] [M00.9]  Effusion of left knee joint [M25.462]  Acute pain of left knee [M25.562]      Assessment:    Principal Problem:    Effusion of left knee joint  Active Problems:    Inflammatory arthritis    Difficulty walking    Acute pain of left knee  Resolved Problems:    Septic arthritis ([facility] [address]      Plan:  1. Left knee effusion  - gram stain negative for organisms thus far, culture negative. Fluid analysis negative for crystals. Does not seem to be c/w gout or CPPD, nor septic arthritis. Has been getting Toradol, ES Tylenol and requiring Morphine  Unable to extend knee due to severity of pain. Continue Tylenol, NSAIDS and PRN morphine. Ortho follow up pending    Planned to send her home yesterday with knee immobilizer, walker and Scripts sent for Ibuprofen, Oxycodone and referral to outpatient PT. MRI shows recurrence of large effusion and also severe OA changes with high grade chondromalacia patella and high grade cartilage fissures. Nursing asked to reach out to ortho for any further recommendations      Subjective:  Patient is being followed for Septic arthritis ([facility] [M00.9]  Effusion of left knee joint [M25.462]  Acute pain of left knee [M25.562]     No acute events overnight. She was unable to have MRI due to pain and not able to extend knee enough to fit in scanner. Still resists attempt at extending knee. Pain to medial aspect    ROS: denies fever, chills, cp, sob, n/v, HA unless stated above.      acetaminophen  1,000 mg Oral 3 times per day    ketorolac  15 mg IntraVENous Q6H    sodium chloride flush  5-40 mL IntraVENous 2 times per day    enoxaparin  40 mg SubCUTAneous Daily     oxyCODONE, 5 mg, Q4H PRN  sodium chloride flush, 5-40 mL, PRN  sodium chloride, , PRN  ondansetron, 4 mg, Q8H PRN   Or  ondansetron, 4 mg, Q6H PRN  polyethylene glycol, 17 g, Daily PRN  morphine, 1 mg, Q3H PRN         Objective:    /62   Pulse 82   Temp 98.3 °F (36.8 °C) (Oral)   Resp 16   Ht 5' 11\" (1.803 m)   Wt 168 lb 9.6 oz (76.5 kg)   SpO2 100%   BMI 23.51 kg/m²     General Appearance: alert and oriented to person, place and time and in no acute distress  Skin: warm and dry  Head: normocephalic and atraumatic  Eyes: pupils equal, round, and reactive to light, extraocular eye movements intact, conjunctivae normal  Neck: neck supple and non tender without mass   Pulmonary/Chest: clear to auscultation bilaterally- no wheezes, rales or rhonchi, normal air movement, no respiratory distress  Cardiovascular: normal rate, normal S1 and S2 and no carotid bruits  Abdomen: soft, non-tender, non-distended, normal bowel sounds, no masses or organomegaly  Extremities: she can extend her knee to about 15-20 degrees and then resists any further attempt at ROM. Pain to medial aspect. Neurologic: no cranial nerve deficit and speech normal        Recent Labs     04/27/22  0940 04/28/22  0414 04/29/22  0910    138 138   K 4.2 3.8 3.7    102 102   CO2 28 27 25   BUN 6 6 7   CREATININE 0.7 0.7 0.8   GLUCOSE 94 92 96   CALCIUM 9.1 9.7 9.4       Recent Labs     04/27/22  0940 04/28/22  0414 04/29/22  0910   WBC 5.0 4.7 4.6   RBC 3.63 3.80 3.59   HGB 10.6* 11.0* 10.5*   HCT 32.8* 33.7* 32.1*   MCV 90.4 88.7 89.4   MCH 29.2 28.9 29.2   MCHC 32.3 32.6 32.7   RDW 14.4 14.1 14.1    379 363   MPV 8.9 8.8 9.0       Radiology:     NOTE: This report was transcribed using voice recognition software. Every effort was made to ensure accuracy; however, inadvertent computerized transcription errors may be present.   Electronically signed by CARYL Martinez CNP on 4/29/2022 at 11:42 AM

## 2022-04-29 NOTE — PROGRESS NOTES
Patients daughter, Valery Alan, stopped up to floor to discuss concerns over her mothers inability to bend knee and walk and not being able to get MRI due to pts pain in knee and unable to straighten leg out for MRI. Daughter is still requesting that her mother receive MRI/pt said she would like to re-attempt tomorrow and requesting that we pre-medicate with morphine IV to control pain. This RN said she will discuss with oncoming RN in the morning and to pass along with ordering physician that pt would still like th re-attempt MRI tomorrow.

## 2022-05-01 LAB
BODY FLUID CULTURE, STERILE: NORMAL
GRAM STAIN RESULT: NORMAL

## 2022-05-02 LAB
BLOOD CULTURE, ROUTINE: NORMAL
CULTURE, BLOOD 2: NORMAL

## 2022-06-09 ENCOUNTER — HOSPITAL ENCOUNTER (EMERGENCY)
Age: 42
Discharge: HOME | End: 2022-06-09
Payer: MEDICARE

## 2022-06-09 ENCOUNTER — APPOINTMENT (OUTPATIENT)
Dept: ULTRASOUND IMAGING | Age: 42
End: 2022-06-09
Payer: MEDICARE

## 2022-06-09 ENCOUNTER — HOSPITAL ENCOUNTER (EMERGENCY)
Age: 42
Discharge: HOME | End: 2022-06-09
Attending: EMERGENCY MEDICINE
Payer: MEDICARE

## 2022-06-09 ENCOUNTER — APPOINTMENT (OUTPATIENT)
Dept: GENERAL RADIOLOGY | Age: 42
End: 2022-06-09
Payer: MEDICARE

## 2022-06-09 ENCOUNTER — APPOINTMENT (OUTPATIENT)
Dept: CT IMAGING | Age: 42
End: 2022-06-09
Payer: MEDICARE

## 2022-06-09 VITALS
TEMPERATURE: 96.9 F | HEART RATE: 83 BPM | WEIGHT: 190 LBS | OXYGEN SATURATION: 100 % | RESPIRATION RATE: 16 BRPM | BODY MASS INDEX: 29.82 KG/M2 | HEIGHT: 67 IN | SYSTOLIC BLOOD PRESSURE: 119 MMHG | DIASTOLIC BLOOD PRESSURE: 63 MMHG

## 2022-06-09 VITALS
HEART RATE: 79 BPM | DIASTOLIC BLOOD PRESSURE: 66 MMHG | RESPIRATION RATE: 18 BRPM | OXYGEN SATURATION: 98 % | TEMPERATURE: 98.5 F | SYSTOLIC BLOOD PRESSURE: 108 MMHG

## 2022-06-09 DIAGNOSIS — J18.9 PNEUMONIA OF LEFT LOWER LOBE DUE TO INFECTIOUS ORGANISM: Primary | ICD-10-CM

## 2022-06-09 DIAGNOSIS — K52.9 GASTROENTERITIS: ICD-10-CM

## 2022-06-09 DIAGNOSIS — N30.00 ACUTE CYSTITIS WITHOUT HEMATURIA: ICD-10-CM

## 2022-06-09 LAB
ALBUMIN SERPL-MCNC: 4.6 G/DL (ref 3.5–5.2)
ALP BLD-CCNC: 68 U/L (ref 35–104)
ALT SERPL-CCNC: 16 U/L (ref 0–32)
ANION GAP SERPL CALCULATED.3IONS-SCNC: 15 MMOL/L (ref 7–16)
AST SERPL-CCNC: 18 U/L (ref 0–31)
BACTERIA: ABNORMAL /HPF
BASOPHILS ABSOLUTE: 0.03 E9/L (ref 0–0.2)
BASOPHILS RELATIVE PERCENT: 0.5 % (ref 0–2)
BILIRUB SERPL-MCNC: 0.5 MG/DL (ref 0–1.2)
BILIRUBIN URINE: NEGATIVE
BLOOD, URINE: ABNORMAL
BUN BLDV-MCNC: 7 MG/DL (ref 6–20)
CALCIUM SERPL-MCNC: 9.8 MG/DL (ref 8.6–10.2)
CHLORIDE BLD-SCNC: 102 MMOL/L (ref 98–107)
CLARITY: ABNORMAL
CO2: 23 MMOL/L (ref 22–29)
COLOR: YELLOW
CREAT SERPL-MCNC: 0.7 MG/DL (ref 0.5–1)
EOSINOPHILS ABSOLUTE: 0.15 E9/L (ref 0.05–0.5)
EOSINOPHILS RELATIVE PERCENT: 2.3 % (ref 0–6)
EPITHELIAL CELLS, UA: ABNORMAL /HPF
GFR AFRICAN AMERICAN: >60
GFR NON-AFRICAN AMERICAN: >60 ML/MIN/1.73
GLUCOSE BLD-MCNC: 74 MG/DL (ref 74–99)
GLUCOSE URINE: NEGATIVE MG/DL
HCG(URINE) PREGNANCY TEST: NEGATIVE
HCT VFR BLD CALC: 37.2 % (ref 34–48)
HEMOGLOBIN: 11.8 G/DL (ref 11.5–15.5)
IMMATURE GRANULOCYTES #: 0.01 E9/L
IMMATURE GRANULOCYTES %: 0.2 % (ref 0–5)
INFLUENZA A BY PCR: NOT DETECTED
INFLUENZA B BY PCR: NOT DETECTED
KETONES, URINE: NEGATIVE MG/DL
LACTIC ACID: 1.5 MMOL/L (ref 0.5–2.2)
LEUKOCYTE ESTERASE, URINE: ABNORMAL
LIPASE: 19 U/L (ref 13–60)
LYMPHOCYTES ABSOLUTE: 2.81 E9/L (ref 1.5–4)
LYMPHOCYTES RELATIVE PERCENT: 44 % (ref 20–42)
MCH RBC QN AUTO: 28.4 PG (ref 26–35)
MCHC RBC AUTO-ENTMCNC: 31.7 % (ref 32–34.5)
MCV RBC AUTO: 89.4 FL (ref 80–99.9)
MONOCYTES ABSOLUTE: 0.51 E9/L (ref 0.1–0.95)
MONOCYTES RELATIVE PERCENT: 8 % (ref 2–12)
NEUTROPHILS ABSOLUTE: 2.88 E9/L (ref 1.8–7.3)
NEUTROPHILS RELATIVE PERCENT: 45 % (ref 43–80)
NITRITE, URINE: POSITIVE
PDW BLD-RTO: 13.7 FL (ref 11.5–15)
PH UA: 6 (ref 5–9)
PLATELET # BLD: 362 E9/L (ref 130–450)
PMV BLD AUTO: 9.1 FL (ref 7–12)
POTASSIUM REFLEX MAGNESIUM: 4.3 MMOL/L (ref 3.5–5)
PROTEIN UA: NEGATIVE MG/DL
RBC # BLD: 4.16 E12/L (ref 3.5–5.5)
RBC UA: ABNORMAL /HPF (ref 0–2)
SARS-COV-2, NAAT: NOT DETECTED
SODIUM BLD-SCNC: 140 MMOL/L (ref 132–146)
SPECIFIC GRAVITY UA: 1.02 (ref 1–1.03)
TOTAL PROTEIN: 8.2 G/DL (ref 6.4–8.3)
TROPONIN, HIGH SENSITIVITY: <6 NG/L (ref 0–9)
UROBILINOGEN, URINE: 1 E.U./DL
WBC # BLD: 6.4 E9/L (ref 4.5–11.5)
WBC UA: ABNORMAL /HPF (ref 0–5)

## 2022-06-09 PROCEDURE — 87635 SARS-COV-2 COVID-19 AMP PRB: CPT

## 2022-06-09 PROCEDURE — 93971 EXTREMITY STUDY: CPT | Performed by: RADIOLOGY

## 2022-06-09 PROCEDURE — 81001 URINALYSIS AUTO W/SCOPE: CPT

## 2022-06-09 PROCEDURE — 74177 CT ABD & PELVIS W/CONTRAST: CPT

## 2022-06-09 PROCEDURE — 96375 TX/PRO/DX INJ NEW DRUG ADDON: CPT

## 2022-06-09 PROCEDURE — 83690 ASSAY OF LIPASE: CPT

## 2022-06-09 PROCEDURE — 2580000003 HC RX 258: Performed by: RADIOLOGY

## 2022-06-09 PROCEDURE — 6360000004 HC RX CONTRAST MEDICATION: Performed by: RADIOLOGY

## 2022-06-09 PROCEDURE — 85025 COMPLETE CBC W/AUTO DIFF WBC: CPT

## 2022-06-09 PROCEDURE — 99281 EMR DPT VST MAYX REQ PHY/QHP: CPT

## 2022-06-09 PROCEDURE — 2580000003 HC RX 258: Performed by: EMERGENCY MEDICINE

## 2022-06-09 PROCEDURE — 84484 ASSAY OF TROPONIN QUANT: CPT

## 2022-06-09 PROCEDURE — 83605 ASSAY OF LACTIC ACID: CPT

## 2022-06-09 PROCEDURE — 80053 COMPREHEN METABOLIC PANEL: CPT

## 2022-06-09 PROCEDURE — 36415 COLL VENOUS BLD VENIPUNCTURE: CPT

## 2022-06-09 PROCEDURE — 87186 SC STD MICRODIL/AGAR DIL: CPT

## 2022-06-09 PROCEDURE — 96374 THER/PROPH/DIAG INJ IV PUSH: CPT

## 2022-06-09 PROCEDURE — 6360000002 HC RX W HCPCS: Performed by: EMERGENCY MEDICINE

## 2022-06-09 PROCEDURE — 96361 HYDRATE IV INFUSION ADD-ON: CPT

## 2022-06-09 PROCEDURE — 87502 INFLUENZA DNA AMP PROBE: CPT

## 2022-06-09 PROCEDURE — 71046 X-RAY EXAM CHEST 2 VIEWS: CPT

## 2022-06-09 PROCEDURE — 93005 ELECTROCARDIOGRAM TRACING: CPT | Performed by: PHYSICIAN ASSISTANT

## 2022-06-09 PROCEDURE — 93971 EXTREMITY STUDY: CPT

## 2022-06-09 PROCEDURE — 81025 URINE PREGNANCY TEST: CPT

## 2022-06-09 PROCEDURE — 99285 EMERGENCY DEPT VISIT HI MDM: CPT

## 2022-06-09 PROCEDURE — 87088 URINE BACTERIA CULTURE: CPT

## 2022-06-09 RX ORDER — DOXYCYCLINE HYCLATE 100 MG
100 TABLET ORAL 2 TIMES DAILY
Qty: 14 TABLET | Refills: 0 | Status: SHIPPED | OUTPATIENT
Start: 2022-06-09 | End: 2022-06-16

## 2022-06-09 RX ORDER — ONDANSETRON 4 MG/1
4 TABLET, ORALLY DISINTEGRATING ORAL 3 TIMES DAILY PRN
Qty: 21 TABLET | Refills: 0 | Status: SHIPPED | OUTPATIENT
Start: 2022-06-09 | End: 2022-09-22 | Stop reason: ALTCHOICE

## 2022-06-09 RX ORDER — SODIUM CHLORIDE 0.9 % (FLUSH) 0.9 %
10 SYRINGE (ML) INJECTION PRN
Status: DISCONTINUED | OUTPATIENT
Start: 2022-06-09 | End: 2022-06-10 | Stop reason: HOSPADM

## 2022-06-09 RX ORDER — KETOROLAC TROMETHAMINE 30 MG/ML
15 INJECTION, SOLUTION INTRAMUSCULAR; INTRAVENOUS ONCE
Status: COMPLETED | OUTPATIENT
Start: 2022-06-09 | End: 2022-06-09

## 2022-06-09 RX ORDER — ONDANSETRON 2 MG/ML
4 INJECTION INTRAMUSCULAR; INTRAVENOUS ONCE
Status: COMPLETED | OUTPATIENT
Start: 2022-06-09 | End: 2022-06-09

## 2022-06-09 RX ORDER — CEFDINIR 300 MG/1
300 CAPSULE ORAL 2 TIMES DAILY
Qty: 14 CAPSULE | Refills: 0 | Status: SHIPPED | OUTPATIENT
Start: 2022-06-09 | End: 2022-06-16

## 2022-06-09 RX ORDER — 0.9 % SODIUM CHLORIDE 0.9 %
1000 INTRAVENOUS SOLUTION INTRAVENOUS ONCE
Status: COMPLETED | OUTPATIENT
Start: 2022-06-09 | End: 2022-06-09

## 2022-06-09 RX ADMIN — Medication 10 ML: at 21:26

## 2022-06-09 RX ADMIN — KETOROLAC TROMETHAMINE 15 MG: 30 INJECTION, SOLUTION INTRAMUSCULAR at 21:09

## 2022-06-09 RX ADMIN — IOPAMIDOL 90 ML: 755 INJECTION, SOLUTION INTRAVENOUS at 21:26

## 2022-06-09 RX ADMIN — CEFTRIAXONE 1000 MG: 1 INJECTION, POWDER, FOR SOLUTION INTRAMUSCULAR; INTRAVENOUS at 21:07

## 2022-06-09 RX ADMIN — SODIUM CHLORIDE 1000 ML: 9 INJECTION, SOLUTION INTRAVENOUS at 21:11

## 2022-06-09 RX ADMIN — ONDANSETRON 4 MG: 2 INJECTION INTRAMUSCULAR; INTRAVENOUS at 21:10

## 2022-06-09 ASSESSMENT — ENCOUNTER SYMPTOMS
BACK PAIN: 1
BLOOD IN STOOL: 0
COUGH: 1
VOMITING: 0
COLOR CHANGE: 0
ABDOMINAL PAIN: 1
DIARRHEA: 1
NAUSEA: 1
SHORTNESS OF BREATH: 0
RHINORRHEA: 1

## 2022-06-09 ASSESSMENT — PAIN SCALES - GENERAL: PAINLEVEL_OUTOF10: 8

## 2022-06-09 ASSESSMENT — PAIN DESCRIPTION - LOCATION: LOCATION: ABDOMEN

## 2022-06-09 NOTE — ED NOTES
Department of Emergency Medicine  FIRST PROVIDER TRIAGE NOTE             Independent MLP           6/9/22     Date of Encounter: 6/9/22   MRN: [id number]      HPI: Prudence Juan is a 43 y.o. female who presents to the ED for Emesis (x4 days for both complaints) and Emesis       ROS: Negative for cp or sob. PE: Gen Appearance/Constitutional: alert     Initial Plan of Care: All treatment areas with department are currently occupied. Plan to order/Initiate the following while awaiting opening in ED: labs, EKG and imaging studies.   Initiate Treatment-Testing, Proceed toTreatment Area When Bed Available for ED Attending/MLP to Continue Care    Electronically signed by ALEJANDRO Nguyen   DD: 6/9/22         ALEJANDRO Oro  06/09/22 1410

## 2022-06-09 NOTE — ED NOTES
Department of Emergency Medicine  FIRST PROVIDER TRIAGE NOTE             Independent MLP           6/9/22  3:03 PM EDT    Date of Encounter: 6/9/22   MRN: [id number]      HPI: Melissa Tobar is a 43 y.o. female who presents to the ED for Abdominal Pain (diffuse lower, x4 days, nausea, emesis, fatigue, generalized bodyaches)     Pt presenting with vomiting, abdominal pain, body aches, cough, cp, sob x 4 days    ROS: Negative for diarrhea or urinary complaints. PE: Gen Appearance/Constitutional: alert  HEENT: NC/NT. PERRLA,  Airway patent. Initial Plan of Care: All treatment areas with department are currently occupied. Plan to order/Initiate the following while awaiting opening in ED: labs, EKG and imaging studies.   Initiate Treatment-Testing, Proceed toTreatment Area When Bed Available for ED Attending/MLP to Continue Care    Electronically signed by SOFIYA Bernardo   DD: 6/9/22       SOFIYA Bernardo  06/09/22 5118

## 2022-06-09 NOTE — ED PROVIDER NOTES
ED PROVIDER NOTE    Chief Complaint   Patient presents with    Abdominal Pain     diffuse lower, x4 days, nausea, emesis, fatigue, generalized bodyaches       HPI:  6/9/22,   Time: 6:00 PM EDT       Kisha Bynum is a 43 y.o. female presenting to the ED for abdominal pain, vomiting, bodyaches. Gradual onset few days ago, progressively worsening, moderate in severity. Also having left leg and knee pain for the past few weeks, was admitted at [facility] about 6 weeks ago, had negative joint fluid studies, MRI showed severe OA. For the past few days has developed diffuse lower abdominal pain, low back pain, left groin pain, cough, rhinorrhea, nausea, vomiting. Abdominal pain diffuse lower, radiates to low back and to chest, no aggravating/alleviating factors. No black/bloody stools. No fever or chills. Decreased po intake and urine output. Chart review: hx of cervical ca, HSV, seizures, depression    Review of Systems:     Review of Systems   Constitutional: Positive for appetite change and fatigue. Negative for chills and fever. HENT: Positive for congestion and rhinorrhea. Eyes: Negative for visual disturbance. Respiratory: Positive for cough. Negative for shortness of breath. Cardiovascular: Negative for chest pain. Gastrointestinal: Positive for abdominal pain, diarrhea and nausea. Negative for blood in stool and vomiting. Genitourinary: Positive for decreased urine volume. Negative for difficulty urinating, dysuria, flank pain, frequency, hematuria and urgency. Musculoskeletal: Positive for back pain and myalgias. Negative for neck pain and neck stiffness. Skin: Negative for color change.    Neurological: Negative for dizziness, syncope, weakness, light-headedness, numbness and headaches.         --------------------------------------------- PAST HISTORY ---------------------------------------------  Past Medical History:   Past Medical History:   Diagnosis Date    Anxiety and depression     Breast disorder     Cancer [facility])     Cervical    Cervical cancer ([facility] [address]     pt doesn't remember when diagnosed, pt states she has been ca free for more than 5 years    Depression     Eclampsia 03/2011    Herpes simplex virus (HSV) infection     Hypothyroid     Mental disorder     Seizures ([facility] [place] [address]     Thyroid disease        Past Surgical History:   Past Surgical History:   Procedure Laterality Date    BREAST LUMPECTOMY Bilateral     CERVIX SURGERY      DILATION AND CURETTAGE OF UTERUS      INNER EAR SURGERY Left     LEG SURGERY Right        Social History:   Social History     Socioeconomic History    Marital status: Single     Spouse name: None    Number of children: None    Years of education: None    Highest education level: None   Occupational History    None   Tobacco Use    Smoking status: Former Smoker    Smokeless tobacco: Never Used   Vaping Use    Vaping Use: Never used   Substance and Sexual Activity    Alcohol use: Yes     Comment: socially    Drug use: No    Sexual activity: Not Currently     Partners: Male     Comment: begining of pregnancy -march   Other Topics Concern    None   Social History Narrative    None     Social Determinants of Health     Financial Resource Strain:     Difficulty of Paying Living Expenses: Not on file   Food Insecurity:     Worried About Running Out of Food in the Last Year: Not on file    Stephen of Food in the Last Year: Not on file   Transportation Needs:     Lack of Transportation (Medical): Not on file    Lack of Transportation (Non-Medical):  Not on file   Physical Activity:     Days of Exercise per Week: Not on file    Minutes of Exercise per Session: Not on file   Stress:     Feeling of Stress : Not on file   Social Connections:     Frequency of Communication with Friends and Family: Not on file    Frequency of Social Gatherings with Friends and Family: Not on file    Attends Church Services: Not on file   [employer]s or Organizations: Not on file    Attends Club or Organization Meetings: Not on file    Marital Status: Not on file   Intimate Partner Violence:     Fear of Current or Ex-Partner: Not on file    Emotionally Abused: Not on file    Physically Abused: Not on file    Sexually Abused: Not on file   Housing Stability:     Unable to Pay for Housing in the Last Year: Not on file    Number of Joe in the Last Year: Not on file    Unstable Housing in the Last Year: Not on file       Family History:   History reviewed. No pertinent family history. The patients home medications have been reviewed. Allergies:   No Known Allergies        ---------------------------------------------------PHYSICAL EXAM--------------------------------------    /86   Pulse 80   Temp 98.5 °F (36.9 °C) (Oral)   Resp 16   SpO2 99%     Physical Exam  Vitals and nursing note reviewed. Constitutional:       General: She is not in acute distress. Appearance: She is not toxic-appearing. HENT:      Mouth/Throat:      Mouth: Mucous membranes are moist.   Eyes:      General: No scleral icterus. Extraocular Movements: Extraocular movements intact. Pupils: Pupils are equal, round, and reactive to light. Cardiovascular:      Rate and Rhythm: Normal rate and regular rhythm. Pulses: Normal pulses. Heart sounds: Normal heart sounds. No murmur heard. Pulmonary:      Effort: Pulmonary effort is normal. No respiratory distress. Breath sounds: Normal breath sounds. No wheezing or rales. Abdominal:      General: There is no distension. Palpations: Abdomen is soft. Tenderness: There is abdominal tenderness (diffuse nonfocal). There is no right CVA tenderness, left CVA tenderness, guarding or rebound. Musculoskeletal:         General: No swelling or tenderness. Normal range of motion. Cervical back: Normal range of motion and neck supple.       Comments: Radial, DP, and PT pulses 2+ bilaterally. Left medial thigh ttp   Skin:     General: Skin is warm and dry. Neurological:      Mental Status: She is alert and oriented to person, place, and time. Comments: Strength 5/5 and sensation grossly intact to light touch and equal bilaterally throughout all extremities             -------------------------------------------------- RESULTS -------------------------------------------------  I have personally reviewed all laboratory and imaging results for this patient. Results are listed below. LABS:  Labs Reviewed   CBC WITH AUTO DIFFERENTIAL - Abnormal; Notable for the following components:       Result Value    MCHC 31.7 (*)     Lymphocytes % 44.0 (*)     All other components within normal limits   URINALYSIS - Abnormal; Notable for the following components:    Nitrite, Urine POSITIVE (*)     Leukocyte Esterase, Urine SMALL (*)     All other components within normal limits   MICROSCOPIC URINALYSIS - Abnormal; Notable for the following components:    Bacteria, UA MANY (*)     All other components within normal limits   COVID-19, RAPID   RAPID INFLUENZA A/B ANTIGENS   CULTURE, URINE   COMPREHENSIVE METABOLIC PANEL W/ REFLEX TO MG FOR LOW K   TROPONIN   LIPASE   LACTIC ACID   PREGNANCY, URINE   LACTIC ACID   POC PREGNANCY UR-QUAL       RADIOLOGY:  Interpreted personally and by Radiologist.  CT ABDOMEN PELVIS W IV CONTRAST Additional Contrast? None   Final Result   1. Small ill-defined opacities in the left lower lobe, likely   infectious/inflammatory. 2. No acute abnormality is seen in the abdomen or the pelvis. 3. Small uterine fibroid. RECOMMENDATIONS:   Unavailable         US DUP LOWER EXTREMITY LEFT YISEL   Final Result   Within the visualized vessels there is no evidence for deep venous   thrombosis               XR CHEST (2 VW)   Final Result   No acute process. EKG:  This EKG is signed and interpreted by the EP.     Normal sinus rhythm, vent rate 86bpm, normal axis and intervals, no acute injury pattern, no clinically significant change compared w/ prior EKG       ------------------------- NURSING NOTES AND VITALS REVIEWED ---------------------------   The nursing notes within the ED encounter and vital signs as below have been reviewed by myself. /86   Pulse 80   Temp 98.5 °F (36.9 °C) (Oral)   Resp 16   SpO2 99%   Oxygen Saturation Interpretation: Normal    The patients available past medical records and past encounters were reviewed. ------------------------------ ED COURSE/MEDICAL DECISION MAKING----------------------  Medications   cefTRIAXone (ROCEPHIN) 1,000 mg in sterile water 10 mL IV syringe (1,000 mg IntraVENous Given 22)   0.9 % sodium chloride bolus (0 mLs IntraVENous Stopped 22)   ondansetron (ZOFRAN) injection 4 mg (4 mg IntraVENous Given 22)   ketorolac (TORADOL) injection 15 mg (15 mg IntraVENous Given 22)   iopamidol (ISOVUE-370) 76 % injection 90 mL (90 mLs IntraVENous Given 22)       Counseling: The emergency provider has spoken with the patient and discussed todays results, in addition to providing specific details for the plan of care and counseling regarding the diagnosis and prognosis. Questions are answered at this time and they are agreeable with the plan. ED Course/Medical Decision Makin y.o. female here with abdominal pain and symptoms c/w viral syndrome. Non-toxic appearing, afebrile, hemodynamically stable, and in no acute distress. Breathing comfortably on room air without respiratory distress. Neurovascularly intact throughout. Workup notable for UTI and left lower lobe infiltrates c/w pneumonia. Treated w/ abx in the ED. Also gave fluids, and pain and nausea control. On reevaluation symptoms improving. After discussion of findings and return precautions, patient agrees with plan for discharge and outpatient follow up with PCP.    Rx ceftriaxone, doxycycline, zofran.     --------------------------------- IMPRESSION AND DISPOSITION ---------------------------------    IMPRESSION  1. Pneumonia of left lower lobe due to infectious organism    2. Gastroenteritis    3. Acute cystitis without hematuria        DISPOSITION  Disposition: Discharge to home  Patient condition is good    NOTE: This report was transcribed using voice recognition software.  Every effort was made to ensure accuracy; however, inadvertent computerized transcription errors may be present    Ariadne Goyal MD  Attending Emergency Physician         Ariadne Goyal MD  06/10/22 [id number]

## 2022-06-10 LAB
EKG ATRIAL RATE: 86 BPM
EKG P AXIS: 63 DEGREES
EKG P-R INTERVAL: 134 MS
EKG Q-T INTERVAL: 412 MS
EKG QRS DURATION: 82 MS
EKG QTC CALCULATION (BAZETT): 493 MS
EKG R AXIS: 31 DEGREES
EKG T AXIS: 33 DEGREES
EKG VENTRICULAR RATE: 86 BPM

## 2022-06-12 LAB
ORGANISM: ABNORMAL
URINE CULTURE, ROUTINE: ABNORMAL

## 2022-06-20 ENCOUNTER — OFFICE VISIT (OUTPATIENT)
Dept: PRIMARY CARE CLINIC | Age: 42
End: 2022-06-20
Payer: MEDICARE

## 2022-06-20 VITALS
BODY MASS INDEX: 30.92 KG/M2 | OXYGEN SATURATION: 98 % | TEMPERATURE: 96.2 F | DIASTOLIC BLOOD PRESSURE: 60 MMHG | HEART RATE: 79 BPM | WEIGHT: 197 LBS | SYSTOLIC BLOOD PRESSURE: 90 MMHG | HEIGHT: 67 IN | RESPIRATION RATE: 20 BRPM

## 2022-06-20 DIAGNOSIS — Z00.01 ENCOUNTER FOR GENERAL ADULT MEDICAL EXAMINATION WITH ABNORMAL FINDINGS: Primary | ICD-10-CM

## 2022-06-20 DIAGNOSIS — R23.2 HOT FLASHES: ICD-10-CM

## 2022-06-20 DIAGNOSIS — R26.2 DIFFICULTY WALKING: ICD-10-CM

## 2022-06-20 DIAGNOSIS — J18.9 PNEUMONIA OF LEFT LOWER LOBE DUE TO INFECTIOUS ORGANISM: ICD-10-CM

## 2022-06-20 DIAGNOSIS — E66.9 OBESITY (BMI 30-39.9): ICD-10-CM

## 2022-06-20 DIAGNOSIS — N39.0 URINARY TRACT INFECTION WITHOUT HEMATURIA, SITE UNSPECIFIED: ICD-10-CM

## 2022-06-20 DIAGNOSIS — Z98.51 S/P TUBAL LIGATION: ICD-10-CM

## 2022-06-20 DIAGNOSIS — M94.262 CHONDROMALACIA OF KNEE, LEFT: ICD-10-CM

## 2022-06-20 DIAGNOSIS — M79.671 PAIN OF RIGHT FOOT: ICD-10-CM

## 2022-06-20 LAB
ALBUMIN SERPL-MCNC: 5.1 G/DL (ref 3.5–5.2)
ALP BLD-CCNC: 67 U/L (ref 35–104)
ALT SERPL-CCNC: 13 U/L (ref 0–32)
ANION GAP SERPL CALCULATED.3IONS-SCNC: 15 MMOL/L (ref 7–16)
AST SERPL-CCNC: 21 U/L (ref 0–31)
BASOPHILS ABSOLUTE: 0.03 E9/L (ref 0–0.2)
BASOPHILS RELATIVE PERCENT: 0.6 % (ref 0–2)
BILIRUB SERPL-MCNC: 0.5 MG/DL (ref 0–1.2)
BILIRUBIN DIRECT: <0.2 MG/DL (ref 0–0.3)
BILIRUBIN, INDIRECT: NORMAL MG/DL (ref 0–1)
BUN BLDV-MCNC: 13 MG/DL (ref 6–20)
CALCIUM SERPL-MCNC: 9.5 MG/DL (ref 8.6–10.2)
CHLORIDE BLD-SCNC: 105 MMOL/L (ref 98–107)
CHOLESTEROL, TOTAL: 212 MG/DL (ref 0–199)
CO2: 22 MMOL/L (ref 22–29)
CREAT SERPL-MCNC: 0.9 MG/DL (ref 0.5–1)
EOSINOPHILS ABSOLUTE: 0.1 E9/L (ref 0.05–0.5)
EOSINOPHILS RELATIVE PERCENT: 1.9 % (ref 0–6)
GFR AFRICAN AMERICAN: >60
GFR NON-AFRICAN AMERICAN: >60 ML/MIN/1.73
GLUCOSE BLD-MCNC: 86 MG/DL (ref 74–99)
HCT VFR BLD CALC: 35.5 % (ref 34–48)
HDLC SERPL-MCNC: 49 MG/DL
HEMOGLOBIN: 11.2 G/DL (ref 11.5–15.5)
IMMATURE GRANULOCYTES #: 0.01 E9/L
IMMATURE GRANULOCYTES %: 0.2 % (ref 0–5)
LDL CHOLESTEROL CALCULATED: 150 MG/DL (ref 0–99)
LYMPHOCYTES ABSOLUTE: 2.7 E9/L (ref 1.5–4)
LYMPHOCYTES RELATIVE PERCENT: 51.9 % (ref 20–42)
MCH RBC QN AUTO: 28.6 PG (ref 26–35)
MCHC RBC AUTO-ENTMCNC: 31.5 % (ref 32–34.5)
MCV RBC AUTO: 90.6 FL (ref 80–99.9)
MONOCYTES ABSOLUTE: 0.33 E9/L (ref 0.1–0.95)
MONOCYTES RELATIVE PERCENT: 6.3 % (ref 2–12)
NEUTROPHILS ABSOLUTE: 2.03 E9/L (ref 1.8–7.3)
NEUTROPHILS RELATIVE PERCENT: 39.1 % (ref 43–80)
PDW BLD-RTO: 14.4 FL (ref 11.5–15)
PLATELET # BLD: 377 E9/L (ref 130–450)
PMV BLD AUTO: 9.3 FL (ref 7–12)
POTASSIUM SERPL-SCNC: 4 MMOL/L (ref 3.5–5)
RBC # BLD: 3.92 E12/L (ref 3.5–5.5)
SODIUM BLD-SCNC: 142 MMOL/L (ref 132–146)
T4 FREE: 0.76 NG/DL (ref 0.93–1.7)
TOTAL PROTEIN: 8.1 G/DL (ref 6.4–8.3)
TRIGL SERPL-MCNC: 63 MG/DL (ref 0–149)
TSH SERPL DL<=0.05 MIU/L-ACNC: 0.66 UIU/ML (ref 0.27–4.2)
VLDLC SERPL CALC-MCNC: 13 MG/DL
WBC # BLD: 5.2 E9/L (ref 4.5–11.5)

## 2022-06-20 PROCEDURE — 99386 PREV VISIT NEW AGE 40-64: CPT | Performed by: FAMILY MEDICINE

## 2022-06-20 PROCEDURE — 81002 URINALYSIS NONAUTO W/O SCOPE: CPT | Performed by: FAMILY MEDICINE

## 2022-06-20 ASSESSMENT — PATIENT HEALTH QUESTIONNAIRE - PHQ9
2. FEELING DOWN, DEPRESSED OR HOPELESS: 0
SUM OF ALL RESPONSES TO PHQ QUESTIONS 1-9: 1
SUM OF ALL RESPONSES TO PHQ9 QUESTIONS 1 & 2: 1
1. LITTLE INTEREST OR PLEASURE IN DOING THINGS: 1
SUM OF ALL RESPONSES TO PHQ QUESTIONS 1-9: 1

## 2022-06-20 NOTE — PROGRESS NOTES
22  Flor Knight : 1980 Sex: female  Age: 43 y.o. Assessment and Plan:  Uriel Adame was seen today for establish care, other and annual exam.    Diagnoses and all orders for this visit:    Encounter for general adult medical examination with abnormal findings    Chondromalacia of knee, left  -     [facility] [place]    Difficulty walking  -     [facility] [place]    Pain of right foot  -     [facility] [place]    Hot flashes  -     TSH; Future  -     T4, Free; Future    Pneumonia of left lower lobe due to infectious organism  -     CBC with Auto Differential; Future    Urinary tract infection without hematuria, site unspecified  -     POCT Urinalysis no Micro  -     Culture, Urine; Future  -     CBC with Auto Differential; Future    BMI 30.0-30.9,adult  -     Basic Metabolic Panel; Future  -     CBC with Auto Differential; Future  -     Lipid Panel; Future  -     Hepatic Function Panel; Future  -     TSH; Future  -     T4, Free; Future    Obesity (BMI 30-39.9)  -     Basic Metabolic Panel; Future  -     CBC with Auto Differential; Future  -     Lipid Panel; Future  -     Hepatic Function Panel; Future  -     TSH; Future  -     T4, Free; Future    S/P tubal ligation      Refer for PT   Can get another ortho opinion   Update routine labs   Further recommendations pending results  Pt will call to reschedule with gyne - needs updated PAP       Return in about 4 weeks (around 2022). Chief Complaint   Patient presents with   [address]     recent hospital stay.  pneumonia    Other     hot flashes    Annual Exam       HPI  Pt here to establish care and for annual exam  Pt has no major health history     She was recently in ER for UTI and PNA   Sent home with cefdinir and doxy   Stopped the ABx about half a week due to associated nausea   Still coughing at night when she lays down   No cough when Musculoskeletal:         General: Normal range of motion. Skin:     General: Skin is warm and dry. Neurological:      General: No focal deficit present. Mental Status: She is alert and oriented to person, place, and time. Psychiatric:         Mood and Affect: Mood normal.         Behavior: Behavior normal.       Counseled patient as appropriate and relevant regarding above diagnosis, including possible risks and complications, especially if left uncontrolled. Counseled patient as appropriate and relevant regarding any  possible side effects, risks, and alternatives to treatment; patient and/or guardian verbalizes understanding, and is in agreement with the plan as detailed above. Reviewed age and gender appropriate health screening exams and vaccinations. Advised patient regarding importance of keeping up with recommended health maintenance and to schedule as soon as possible if overdue, as this is important in assessing for undiagnosed pathology, especially cancer, as well as protecting against potentially harmful/life threatening disease. If discussed, any educational materials and/or home exercises printed for patient's review and were included in patient instructions on his/her After Visit Summary and given to patient at the end of visit. Advised patient to call with any new medication issues, and and other concerns/complaints prior to scheduled follow up. All questions answered to the patient's satisfaction.         Seen By:  Kenyetta Roth MD

## 2022-06-23 LAB — URINE CULTURE, ROUTINE: NORMAL

## 2022-06-29 ENCOUNTER — TELEPHONE (OUTPATIENT)
Dept: PRIMARY CARE CLINIC | Age: 42
End: 2022-06-29

## 2022-06-29 NOTE — TELEPHONE ENCOUNTER
Pt was unable to  the following medication because pharmacy informed her to call her PCP's office about it:    aluminum chloride (DRYSOL) 20 % external solution

## 2022-06-30 ENCOUNTER — TELEPHONE (OUTPATIENT)
Dept: PRIMARY CARE CLINIC | Age: 42
End: 2022-06-30

## 2022-07-06 ENCOUNTER — HOSPITAL ENCOUNTER (OUTPATIENT)
Dept: PHYSICAL THERAPY | Age: 42
Setting detail: THERAPIES SERIES
Discharge: HOME | End: 2022-07-06

## 2022-07-07 NOTE — TELEPHONE ENCOUNTER
Patient called stating the below script is still not at her pharmacy , pt also asked did we get an approval though her insurance for the below     Patient upset and asked for a quick response    aluminum chloride (DRYSOL) 20 % external solution         Please advise

## 2022-07-08 NOTE — TELEPHONE ENCOUNTER
Prior [employer]lette nKott form has been faxed to [facility].  Notified the patient that she needs to check with the pharmacy and/or insurance to verify the progress and outcome

## 2022-07-12 NOTE — TELEPHONE ENCOUNTER
Called the number on the prior auth form to check the status; the agent stated they don't handle that prior auth and provided me with the correct phone and fax number to Medicare Part D prior auth:    Phone:[phone]  Fax: [phone]    Spoke with an agent in the Medicare Part D dept who stated I can fax the same forms again.  I faxed the forms again this time to [phone]

## 2022-07-12 NOTE — TELEPHONE ENCOUNTER
Patient called to get status of auth for the below medication     aluminum chloride (DRYSOL) 20 % external solution

## 2022-07-14 NOTE — TELEPHONE ENCOUNTER
I called Medicare Part D to check on status of prior auth.  Rep stated the prior auth was denied and that paperwork was faxed explaining denial.

## 2022-07-18 ENCOUNTER — TELEPHONE (OUTPATIENT)
Dept: PRIMARY CARE CLINIC | Age: 42
End: 2022-07-18

## 2022-07-18 NOTE — TELEPHONE ENCOUNTER
Patient called asking if Dr can send in an alternative medication for her hot flashes , Drysol was not approved.

## 2022-07-20 NOTE — TELEPHONE ENCOUNTER
I'm sorry, is it just sweating or hot flashes?  We sometimes use paxil or similar med for hot flashes, and this is an option, but not something I would do for just regular excessive sweating

## 2022-07-21 RX ORDER — PAROXETINE HYDROCHLORIDE 20 MG/1
20 TABLET, FILM COATED ORAL DAILY
Qty: 30 TABLET | Refills: 0 | Status: SHIPPED
Start: 2022-07-21 | End: 2022-09-22 | Stop reason: ALTCHOICE

## 2022-07-21 NOTE — TELEPHONE ENCOUNTER
Paxil sent to start  Please let her know were are not using it as an anti=depressant but because it has shown efficacy in hot flashes  Will need to make f/u to further address

## 2022-08-15 ENCOUNTER — CARE COORDINATION (OUTPATIENT)
Dept: CARE COORDINATION | Age: 42
End: 2022-08-15

## 2022-08-15 NOTE — LETTER
8/16/2022    Jessica Hurst  [address] [facility]    Dear Jessica Hurst,    I enjoyed speaking with you and wanted to send some additional information. Anabell Dias MD and I will work together to ensure your needs are met and help you achieve your health goals. We are committed to walk with you on this journey and look forward to working with you. Please feel free to contact me with any questions or concerns. I am available by phone at [phone].     In good health,     ANU Mullins, RN  Ambulatory Care Manager      Enclosed: Walk-in care sites handout        Where to go according to symptoms handout

## 2022-08-15 NOTE — CARE COORDINATION
Attempted to contact pt to discuss and offer care coordination enrollment, no answer. Left a HIPPA appropriate VM introducing myself and asking for a call back, contact information given. Will try to reach pt another time.

## 2022-08-16 SDOH — ECONOMIC STABILITY: TRANSPORTATION INSECURITY
IN THE PAST 12 MONTHS, HAS LACK OF TRANSPORTATION KEPT YOU FROM MEETINGS, WORK, OR FROM GETTING THINGS NEEDED FOR DAILY LIVING?: YES

## 2022-08-16 SDOH — HEALTH STABILITY: PHYSICAL HEALTH: ON AVERAGE, HOW MANY DAYS PER WEEK DO YOU ENGAGE IN MODERATE TO STRENUOUS EXERCISE (LIKE A BRISK WALK)?: 0 DAYS

## 2022-08-16 SDOH — HEALTH STABILITY: PHYSICAL HEALTH: ON AVERAGE, HOW MANY MINUTES DO YOU ENGAGE IN EXERCISE AT THIS LEVEL?: 0 MIN

## 2022-08-16 SDOH — ECONOMIC STABILITY: FOOD INSECURITY: WITHIN THE PAST 12 MONTHS, YOU WORRIED THAT YOUR FOOD WOULD RUN OUT BEFORE YOU GOT MONEY TO BUY MORE.: NEVER TRUE

## 2022-08-16 SDOH — ECONOMIC STABILITY: TRANSPORTATION INSECURITY
IN THE PAST 12 MONTHS, HAS THE LACK OF TRANSPORTATION KEPT YOU FROM MEDICAL APPOINTMENTS OR FROM GETTING MEDICATIONS?: YES

## 2022-08-16 SDOH — ECONOMIC STABILITY: FOOD INSECURITY: WITHIN THE PAST 12 MONTHS, THE FOOD YOU BOUGHT JUST DIDN'T LAST AND YOU DIDN'T HAVE MONEY TO GET MORE.: NEVER TRUE

## 2022-08-16 ASSESSMENT — LIFESTYLE VARIABLES
HOW MANY STANDARD DRINKS CONTAINING ALCOHOL DO YOU HAVE ON A TYPICAL DAY: 1 OR 2
HOW OFTEN DO YOU HAVE A DRINK CONTAINING ALCOHOL: NEVER

## 2022-08-16 ASSESSMENT — SOCIAL DETERMINANTS OF HEALTH (SDOH)
ARE YOU MARRIED, WIDOWED, DIVORCED, SEPARATED, NEVER MARRIED, OR LIVING WITH A PARTNER?: NEVER MARRIED
HOW OFTEN DO YOU ATTEND CHURCH OR RELIGIOUS SERVICES?: NEVER
HOW OFTEN DO YOU GET TOGETHER WITH FRIENDS OR RELATIVES?: MORE THAN THREE TIMES A WEEK
HOW OFTEN DO YOU ATTENT MEETINGS OF THE CLUB OR ORGANIZATION YOU BELONG TO?: NEVER
HOW HARD IS IT FOR YOU TO PAY FOR THE VERY BASICS LIKE FOOD, HOUSING, MEDICAL CARE, AND HEATING?: VERY HARD
DO YOU BELONG TO ANY CLUBS OR ORGANIZATIONS SUCH AS CHURCH GROUPS UNIONS, FRATERNAL OR ATHLETIC GROUPS, OR SCHOOL GROUPS?: NO
IN A TYPICAL WEEK, HOW MANY TIMES DO YOU TALK ON THE PHONE WITH FAMILY, FRIENDS, OR NEIGHBORS?: MORE THAN THREE TIMES A WEEK

## 2022-08-16 NOTE — CARE COORDINATION
Ambulatory Care Coordination Note  8/16/2022    ACC: Jane Zhang RN    Summary Note:   *Enrollment  -Contacted pt to discuss and offer care coordination enrollment. Patient referred by payor  -Introduced myself, explained my role, and care coordination in detail, pt agreeable to enrollment  -See CC protocol for enrollment details  -Pt resides in a 2 story home with a basement with her 3 yo son  -Pt able to perform her ADLs and IADLs without difficulty  -DME: FWW, shower chair    *SDOH  -Financial resource strain: pt reports that it is very hard financially. Reports having HEAP and PIPP but reports a strain financially.    -Transportation: pt reports that a lack of transportation has kept her from getting to appts and from getting things needed for daily living  -Food insecurities: denies any issues with food insecurities, reports receiving SNAP benefits  -Stress: reports \"only a little\" stress: reports that it is d/t her stressing about her night sweats     *Medications  -Reviewed medications with pt who denies having any affordability issues  -Reports that she isn't currently taking any medications  -Reports that she didn't  the Paxil that was prescribed for her night sweats, reports that she took an anti-depressant when she was younger and it made her very drowsy and she is not comfortable taking any type of anti-depressants  -ACM shared with pt that as PCP stated, it isn't being given for depression, it is to treat the night sweats, pt reports that she isn't comfortable taking it   -ACM will notify PCP of pt not taking and of pt concern    *Advance directives  -Pt reports not having advance directives, ACM explained the benefit and importance of having and that ACP referral can be placed to assist in completing, pt agreeable  -Confirmed healthcare decision maker information  -Confirmed pt's address to have documents mailed to pt    *Appts  -ACM noted pt doesn't have PCP f/u appt scheduled and didn't f/u with orthopedic surgery after her hospital stay 4/2022 for L knee arthritis and pain, pt reports that she doesn't have transportation  -SW referral for transportation    *Plan  -Care coordination   -SW referral for transportation and financial resource strain  -ACP referral for advance directives completion assistance  -Notify PCP of pt not wanting to take Paxil for her night sweats  -ACM will have welcome letter, walk-in care sites handout, and where to go according to symptoms handout mailed to pt  -ACM will f/u in about 1 week to check progress, assess needs, check status of L knee pain, complete PCAM, create goals, assist with scheduling PCP and orthopedic surgery appts once transportation secured, pt to contact ACM if any needs arise, confirmed pt has contact information  Ambulatory Care Coordination Assessment    Care Coordination Protocol  Referral from Primary Care Provider: No  Week 1 - Initial Assessment     Do you have all of your prescriptions and are they filled?: No  Barriers to medication adherence: Does not understand need for medication  Are you able to afford your medications?: Yes  How often do you have trouble taking your medications the way you have been told to take them?: I always take them as prescribed. Do you have Home O2 Therapy?: No      Ability to seek help/take action for Emergent Urgent situations i.e. fire, crime, inclement weather or health crisis. : Independent  Ability to ambulate to restroom: Independent  Ability handle personal hygeine needs (bathing/dressing/grooming): Independent  Ability to manage Medications: Independent  Ability to prepare Food Preparation: Independent  Ability to maintain home (clean home, laundry): Needs Assistance  Ability to drive and/or has transportation: Dependent  Ability to do shopping: Independent  Ability to manage finances:  Independent  Is patient able to live independently?: Yes     Current Housing: Private Residence  For whom are you the caregiver?: 3 yr old child  Does the person that you care for see a [facility] PCP?: No        Per the Fall Risk Screening, did the patient have 2 or more falls or 1 fall with injury in the past year?: Yes  How often do you think you are about to fall and you do NOT fall? For example, you grab something to stabilize yourself or hold onto a wall/furniture?: Rarely  Use of a Mobility Aid: Yes (Comment: FWW)  Difficulty walking/impaired gait: Yes  Issues with feet or shoes like numbness, edema, shoes not fitting: Yes (Comment: recent LLE surgery)  Changes in vision, poor vision or poor lighting in environment: No  Dizziness: No  Other Fall Risk: No     Frequent urination at night?: No  Do you use rails/bars?: Yes  Do you have a non-slip tub mat?: No     Are you experiencing loss of meaning?: No  Are you experiencing loss of hope and peace?: No     Suggested Interventions and Community Resources  Fall Risk Prevention: Completed Pharmacist: Not Started   Social Work: Completed   Other Services: In Process   Transportation Services: Completed         Set up/Review Goals, Set up/Review an Education Plan                Prior to Admission medications    Medication Sig Start Date End Date Taking? Authorizing Provider   PARoxetine (PAXIL) 20 MG tablet Take 1 tablet by mouth in the morning. Patient not taking: Reported on 8/16/2022 7/21/22   Ochoa Melvin MD   aluminum chloride (DRYSOL) 20 % external solution Apply topically nightly.   Patient not taking: Reported on 8/16/2022 6/28/22   Ochoa Melvin MD   ondansetron (ZOFRAN-ODT) 4 MG disintegrating tablet Take 1 tablet by mouth 3 times daily as needed for Nausea or Vomiting  Patient not taking: Reported on 8/16/2022 6/9/22   Zeb Osuna MD   ibuprofen (ADVIL;MOTRIN) 800 MG tablet Take 1 tablet by mouth every 8 hours for 7 days TAKE WITH FOOD  Patient not taking: Reported on 8/16/2022 4/28/22 5/5/22  CARYL Osorio - CNP   acetaminophen (TYLENOL) 500 MG tablet Take 2

## 2022-08-17 ENCOUNTER — CARE COORDINATION (OUTPATIENT)
Dept: CARE COORDINATION | Age: 42
End: 2022-08-17

## 2022-08-17 ENCOUNTER — TELEPHONE (OUTPATIENT)
Dept: CARE COORDINATION | Age: 42
End: 2022-08-17

## 2022-08-17 NOTE — CARE COORDINATION
Initial Contact Social Work Note - Ambulatory  8/17/2022      Date of referral: 8/16/22  Referral received from: Francisco Javier Medellin RN  Reason for referral: transportation and financial resource strain    Previous  referral: No  If yes, brief summary of outcome: NA    Two Identifiers Verified: Yes    Insurance Provider: Octaviano ZAIDI & [address]: Adult Child/Children, Sibling(s), and friends    [place] Status:  NA    Community Providers:  SNAP/Food [place] $243 per month, [address] (in the past/nor current), and [employer] for PIPP    ADL Assistance Needed: N/A - uses a walker for ambulation    Housing/Living Concerns or Home Modification Needs: NA     Transportation Concern: Yes - Doesn't drive - unclear if any is available with insurance    Medication Cost Concern: NA     Medication Adherence Concern: NA    Financial Concern(s): Yes - SS     Income (only if applicable): $3574 per month (average)     Ability to Read/Write: Yes    Advance Care Plan:  Referred to ACP specialist for further assistance    Other: NA    Identified Needs:  transportation        Social Work Plan:  Review insurance plans  Review 211    Next Steps: follow up call    Method of Communication With Provider (if appropriate): N/a       Goals Addressed    None        CC spoke with Soy Padilla today and completed the above assessment. Soy Padilla states she is independent with her ADL/IADL's except for transportation. She states lives at home with her 3year old son. She does have SNAP in place and is on PIPP with [employer]. [facility] reviewed [facility] and [facility] for help with transportation. [facility] reviewed the resource for living in [facility] under [facility] for the transportation number. Number was given to Soy Padilla for that transportation. Reviewed the OTC program with [facility] and Soy Padilla states she has that and the card to use. Soy Padilla states her ALEXX is with [facility].   [facility] can't see the ALEXX card in the chart. Encouraged Angie Thompson to call the member service number on her ALEXX card to inquire about the transportation under ALEXX as well. Reviewed 211 for help with food cost if that is part of the financial strain. Reviewed [employer] and [employer] for possible help with financial strain if related to bills. Angie Thompson states that now she is on PIPP/HEAP she is not having financial strain     Angie Wilkinsonr to [employer] transportation and verify she has ability to schedule with them for her PT appointments. [facility] will assist on next outreach call if Angie Thompson feels she needs help, currently feels she can call on her own.      [facility] to follow up accordingly

## 2022-08-17 NOTE — TELEPHONE ENCOUNTER
ACPS spoke with Sharon Quintero today regarding the referral for AD's and goals of care. Sharon Lathamer would like to have the information mailed to her home; home address was verified. ACPS reviewed with Sharon Quintero the ways that ACPS can assist with completing the AD documents.   ACPS will follow up with Sharon Quintero in a couple of weeks to see how Sharon Lathamer would prefer to move forward with completing her AD's

## 2022-08-23 ENCOUNTER — CARE COORDINATION (OUTPATIENT)
Dept: CARE COORDINATION | Age: 42
End: 2022-08-23

## 2022-08-29 ENCOUNTER — TELEPHONE (OUTPATIENT)
Dept: CARE COORDINATION | Age: 42
End: 2022-08-29

## 2022-08-29 ENCOUNTER — CARE COORDINATION (OUTPATIENT)
Dept: CARE COORDINATION | Age: 42
End: 2022-08-29

## 2022-08-29 NOTE — CARE COORDINATION
[facility] called Lotus Hamilton for follow up to the [facility] transportation number that was given on last outreach. There was no answer.    left with [facility] information and request for a returned call    [facility] to follow accordingly

## 2022-08-29 NOTE — TELEPHONE ENCOUNTER
ACPS made a follow up call to [facility] to check on the mailed AD's that were sent to her. There was no answer.  was left with ACPS information and a request for a returned call.     ACPS to follow accordingly

## 2022-09-02 ENCOUNTER — CARE COORDINATION (OUTPATIENT)
Dept: CARE COORDINATION | Age: 42
End: 2022-09-02

## 2022-09-02 NOTE — CARE COORDINATION
Contacted pt for CC f/u, pt states that she is in the bathroom getting cleaned up. Will try to reach pt another time.

## 2022-09-09 ENCOUNTER — CARE COORDINATION (OUTPATIENT)
Dept: CARE COORDINATION | Age: 42
End: 2022-09-09

## 2022-09-09 NOTE — CARE COORDINATION
Ambulatory Care Coordination Note  9/9/2022    ACC: Jenn Horner, RN    Summary Note:   *CC f/u  -Pt reports doing ok, getting over having a cold per pt  -Reports that she continues to have bad night sweats, inquiring about what can be done. ACM discussed the need for pt to have a f/u appt with PCP, pt asking for assistance with scheduling, will ask CC SS to assist pt. Encouraged pt to attend the appt so she can further discuss with PCP, verbalized understanding  -Not currently taking any medications at this time  -ACM inquired about pt's L knee pain, reports that it hurts at night. ACM discussed that pt was to f/u with Dr Darrel Amaya (ortho) after her ED visit 4/26. Pt asking for assistance with scheduling. Will ask CC SS to assist pt  -Inquired about if pt received the handouts that were mailed, reports she did and denies having any questions    *Plan  -Care coordination  -Ask CC SS to assist pt with scheduling PCP f/u appt and appt with Dr Darrel Amaya (ortho)  -ACM will f/u in about 1 week to check progress and assess needs    Lab Results       None            Care Coordination Interventions    Referral from Primary Care Provider: No  Suggested Interventions and Community Resources  Fall Risk Prevention: Not Started (Comment: 8/16)  Pharmacist: Not Started (Comment: 8/16)  Social Work: Completed (Comment: 8/16)  Other Services: In Process  Transportation Support: Completed          Goals Addressed                   This Visit's Progress     Conditions and Symptoms        I will schedule office visits, as directed by my provider. I will keep my appointment or reschedule if I have to cancel. I will notify my provider of any barriers to my plan of care. I will follow my Zone Management tool to seek urgent or emergent care. I will notify my provider of any symptoms that indicate a worsening of my condition.     Barriers: lack of motivation, financial, lack of support, and lack of education  Plan for overcoming my barriers: ACM will educate pt on where to fo according to symptoms, keeping all scheduled appts, and knowing where to go according to symptoms. Confidence: 4/10  Anticipated Goal Completion Date: 12/2/2022                Prior to Admission medications    Medication Sig Start Date End Date Taking? Authorizing Provider   PARoxetine (PAXIL) 20 MG tablet Take 1 tablet by mouth in the morning. Patient not taking: Reported on 8/16/2022 7/21/22   Isaías Perez MD   aluminum chloride (DRYSOL) 20 % external solution Apply topically nightly. Patient not taking: Reported on 8/16/2022 6/28/22   Isaías Perez MD   ondansetron (ZOFRAN-ODT) 4 MG disintegrating tablet Take 1 tablet by mouth 3 times daily as needed for Nausea or Vomiting  Patient not taking: Reported on 8/16/2022 6/9/22   Tabitha Connor MD   ibuprofen (ADVIL;MOTRIN) 800 MG tablet Take 1 tablet by mouth every 8 hours for 7 days TAKE WITH FOOD  Patient not taking: Reported on 8/16/2022 4/28/22 5/5/22  CARYL Garcia CNP   acetaminophen (TYLENOL) 500 MG tablet Take 2 tablets by mouth in the morning, at noon, and at bedtime Take Extra strength Tylenol  - 2 tabs orally 3 times daily for next 1 week  Patient not taking: Reported on 8/16/2022 4/28/22   CARYL Garcia CNP       No future appointments.  and   General Assessment    Do you have any symptoms that are causing concern?: No

## 2022-09-13 ENCOUNTER — TELEPHONE (OUTPATIENT)
Dept: CARE COORDINATION | Age: 42
End: 2022-09-13

## 2022-09-13 NOTE — TELEPHONE ENCOUNTER
Patient is scheduled for the following appointments:    Dr. Anselmo Philip  9/16 @ 10:45    Dr. Alona Goins 9/21 @10:30     Pt is agreeable to appointments.

## 2022-09-15 ENCOUNTER — CARE COORDINATION (OUTPATIENT)
Dept: CARE COORDINATION | Age: 42
End: 2022-09-15

## 2022-09-15 NOTE — CARE COORDINATION
Ambulatory Care Coordination Note  9/15/2022    ACC: Filemon Horner, RN    Summary Note:   *CC f/u  -Pt reports dong ok, continues to have night sweats  -Reminded pt of her PCP appt tomorrow [email or web address], pt plans to attend, will discuss her night sweats at visit  -Pt currently not taking any medications at this time  -Reports that she can't make the appt for Dr Tremayne Caban that was scheduled for 9/21 d/t not having transportation, states that she could go to a provider closer to the hospital, ACM will check to see if Dr Tremayne Caban has an office closer for pt and if not will discuss with PCP to  if new referral should be placed for pt, pt reports she will discus at her PCP appt as well    *Plan  -Care coordination  -Pt to attend PCP Appt 9/16  -ACM will f/u in about 1 week to check progress and assess needs, pt to contact ACM if any needs arise, has contact information    Lab Results       None            Care Coordination Interventions    Referral from Primary Care Provider: No  Suggested Interventions and Community Resources  Fall Risk Prevention: Not Started (Comment: 8/16)  Pharmacist: Not Started (Comment: 8/16)  Social Work: Completed (Comment: 8/16)  Other Services: In Process  Transportation Support: Completed          Goals Addressed    None         Prior to Admission medications    Medication Sig Start Date End Date Taking? Authorizing Provider   PARoxetine (PAXIL) 20 MG tablet Take 1 tablet by mouth in the morning. Patient not taking: Reported on 8/16/2022 7/21/22   Iesha Burgess MD   aluminum chloride (DRYSOL) 20 % external solution Apply topically nightly.   Patient not taking: Reported on 8/16/2022 6/28/22   Iesha Burgess MD   ondansetron (ZOFRAN-ODT) 4 MG disintegrating tablet Take 1 tablet by mouth 3 times daily as needed for Nausea or Vomiting  Patient not taking: Reported on 8/16/2022 6/9/22   Kinsey Bishop MD   ibuprofen (ADVIL;MOTRIN) 800 MG tablet Take 1 tablet by mouth every 8 hours for 7 days TAKE WITH FOOD  Patient not taking: Reported on 8/16/2022 4/28/22 5/5/22  CARYL Davidson CNP   acetaminophen (TYLENOL) 500 MG tablet Take 2 tablets by mouth in the morning, at noon, and at bedtime Take Extra strength Tylenol  - 2 tabs orally 3 times daily for next 1 week  Patient not taking: Reported on 8/16/2022 4/28/22   CARYL Davidson CNP       Future Appointments   Date Time Provider Pb Cuellar   9/16/2022 10:45 AM Juan Jose Fong MD [facility]    and   General Assessment    Do you have any symptoms that are causing concern?: No

## 2022-09-20 ENCOUNTER — CARE COORDINATION (OUTPATIENT)
Dept: CARE COORDINATION | Age: 42
End: 2022-09-20

## 2022-09-20 NOTE — CARE COORDINATION
Received incoming call from pt stating that she contacted [employer] transportation was informed that it's too late to set up transport for her PCP appt on 9/22. AC recommended that pt contact [facility] transportation to see if they are able to assist pt with transport and if not, encouraged pt to r/s her PCP appt, pt reports that she has the PCP's office number. Pt also reports that she would like to cancel the appt with Dr Rose Marie Mcintyre for 9/21 and will discuss with PCP about seeing an orthopedic provider closer to her. ACM discussed that pt's transport services from [address] would most likely transport pt to Dr Cristhian Villarreal office but it would be up to pt. Pt reports that she would rather have an orthopedic doctor closer to home and she will discuss with PCP.

## 2022-09-22 ENCOUNTER — OFFICE VISIT (OUTPATIENT)
Dept: PRIMARY CARE CLINIC | Age: 42
End: 2022-09-22
Payer: MEDICARE

## 2022-09-22 VITALS
HEART RATE: 86 BPM | BODY MASS INDEX: 30.13 KG/M2 | OXYGEN SATURATION: 97 % | WEIGHT: 192 LBS | HEIGHT: 67 IN | SYSTOLIC BLOOD PRESSURE: 110 MMHG | TEMPERATURE: 96.8 F | DIASTOLIC BLOOD PRESSURE: 68 MMHG | RESPIRATION RATE: 18 BRPM

## 2022-09-22 DIAGNOSIS — R79.89 ABNORMAL THYROID BLOOD TEST: ICD-10-CM

## 2022-09-22 DIAGNOSIS — M94.262 CHONDROMALACIA OF KNEE, LEFT: ICD-10-CM

## 2022-09-22 DIAGNOSIS — Z11.59 NEED FOR HEPATITIS C SCREENING TEST: ICD-10-CM

## 2022-09-22 DIAGNOSIS — R61 NIGHT SWEATS: Primary | ICD-10-CM

## 2022-09-22 DIAGNOSIS — Z11.4 SCREENING FOR HIV (HUMAN IMMUNODEFICIENCY VIRUS): ICD-10-CM

## 2022-09-22 DIAGNOSIS — R61 NIGHT SWEATS: ICD-10-CM

## 2022-09-22 PROCEDURE — 99214 OFFICE O/P EST MOD 30 MIN: CPT | Performed by: FAMILY MEDICINE

## 2022-09-22 NOTE — PROGRESS NOTES
22  Mauro Shoulder : 1980 Sex: female  Age: 43 y.o. Assessment and Plan:  Kin Han was seen today for sweats and leg pain. Diagnoses and all orders for this visit:    Night sweats  -     HIV Screen; Future  -     C-Reactive Protein; Future  -     TSH; Future  -     T4, Free; Future  -     XR CHEST (2 VW); Future    Abnormal thyroid blood test  -     TSH; Future  -     T4, Free; Future    Need for hepatitis C screening test  -     Hepatitis C Antibody; Future    Chondromalacia of knee, left  -     Trish Campbell MD, Sports Medicine, [facility]    Screening for HIV (human immunodeficiency virus)  -     HIV Screen; Future      Check associated labs   Check CXR, plan for PPD next week  Further recommendations pending results  Also consider f/u with gyne for possible early menopause? Refer to sports med for persistent left knee issues     Return in about 3 months (around 2022). Chief Complaint   Patient presents with    Sweats    Leg Pain     left       HPI  Pt here for f/u     Sweating still, at night mostly but sometimes during the day   Never had problems until after surgery earlier this year   She starts sweating even while awake, then wakes up in a puddle  Feels like her body is boiling, feels warm inside   Has to wiping down with a cold rag and then changing   Sits up for a while before she can calm down  Sometimes feels she has to catch her breath but not waking up SOB  Happening every night     Pt also c/o left leg pain   She had MRI shows recurrence of large effusion and also severe OA changes with high grade chondromalacia patella and high grade cartilage fissures.   Aspiration done in ER back earlier this year       Labs reviewed in full with patient:  Component      Latest Ref Rng & Units 2022          12:00 PM 12:00 PM 12:00 PM 12:00 PM   WBC      4.5 - 11.5 E9/L       RBC      3.50 - 5.50 E12/L       Hemoglobin Quant      11.5 - 15.5 g/dL       Hematocrit      34.0 - 48.0 %       MCV      80.0 - 99.9 fL       MCH      26.0 - 35.0 pg       MCHC      32.0 - 34.5 %       RDW      11.5 - 15.0 fL       Platelet Count      092 - 450 E9/L       MPV      7.0 - 12.0 fL       Neutrophils %      43.0 - 80.0 %       Immature Granulocytes %      0.0 - 5.0 %       Lymphocyte %      20.0 - 42.0 %       Monocytes %      2.0 - 12.0 %       Eosinophils %      0.0 - 6.0 %       Basophils %      0.0 - 2.0 %       Neutrophils Absolute      1.80 - 7.30 E9/L       Immature Granulocytes #      E9/L       Lymphocytes Absolute      1.50 - 4.00 E9/L       Monocytes Absolute      0.10 - 0.95 E9/L       Eosinophils Absolute      0.05 - 0.50 E9/L       Basophils Absolute      0.00 - 0.20 E9/L       Sodium      132 - 146 mmol/L   142    Potassium      3.5 - 5.0 mmol/L   4.0    Chloride      98 - 107 mmol/L   105    CO2      22 - 29 mmol/L   22    Anion Gap      7 - 16 mmol/L   15    Glucose, Random      74 - 99 mg/dL   86    BUN,BUNPL      6 - 20 mg/dL   13    Creatinine      0.5 - 1.0 mg/dL   0.9    GFR Non-African American      >=60 mL/min/1.73   >60    GFR          >60    CALCIUM, SERUM, [id number]      8.6 - 10.2 mg/dL   9.5    Total Protein      6.4 - 8.3 g/dL 8.1      Albumin      3.5 - 5.2 g/dL 5.1      Alk Phos      35 - 104 U/L 67      ALT      0 - 32 U/L 13      AST      0 - 31 U/L 21      Bilirubin      0.0 - 1.2 mg/dL 0.5      Bilirubin, Direct      0.0 - 0.3 mg/dL <0.2      Bilirubin, Indirect      0.0 - 1.0 mg/dL see below      CHOLESTEROL, TOTAL, [id number]      0 - 199 mg/dL  212 (H)     Triglycerides      0 - 149 mg/dL  63     HDL Cholesterol      >40 mg/dL  49     LDL Calculated      0 - 99 mg/dL  150 (H)     VLDL Cholesterol Calculated      mg/dL  13     TSH      0.270 - 4.200 uIU/mL       T4 Free      0.93 - 1.70 ng/dL    0.76 (L)     Component      Latest Ref Rng & Units 6/20/2022 6/20/2022          12:00 PM 12:00 PM   WBC 4.5 - 11.5 E9/L  5.2   RBC      3.50 - 5.50 E12/L  3.92   Hemoglobin Quant      11.5 - 15.5 g/dL  11.2 (L)   Hematocrit      34.0 - 48.0 %  35.5   MCV      80.0 - 99.9 fL  90.6   MCH      26.0 - 35.0 pg  28.6   MCHC      32.0 - 34.5 %  31.5 (L)   RDW      11.5 - 15.0 fL  14.4   Platelet Count      537 - 450 E9/L  377   MPV      7.0 - 12.0 fL  9.3   Neutrophils %      43.0 - 80.0 %  39.1 (L)   Immature Granulocytes %      0.0 - 5.0 %  0.2   Lymphocyte %      20.0 - 42.0 %  51.9 (H)   Monocytes %      2.0 - 12.0 %  6.3   Eosinophils %      0.0 - 6.0 %  1.9   Basophils %      0.0 - 2.0 %  0.6   Neutrophils Absolute      1.80 - 7.30 E9/L  2.03   Immature Granulocytes #      E9/L  0.01   Lymphocytes Absolute      1.50 - 4.00 E9/L  2.70   Monocytes Absolute      0.10 - 0.95 E9/L  0.33   Eosinophils Absolute      0.05 - 0.50 E9/L  0.10   Basophils Absolute      0.00 - 0.20 E9/L  0.03   Sodium      132 - 146 mmol/L     Potassium      3.5 - 5.0 mmol/L     Chloride      98 - 107 mmol/L     CO2      22 - 29 mmol/L     Anion Gap      7 - 16 mmol/L     Glucose, Random      74 - 99 mg/dL     BUN,BUNPL      6 - 20 mg/dL     Creatinine      0.5 - 1.0 mg/dL     GFR Non-African American      >=60 mL/min/1.73     GFR African American           CALCIUM, SERUM, [id number]      8.6 - 10.2 mg/dL     Total Protein      6.4 - 8.3 g/dL     Albumin      3.5 - 5.2 g/dL     Alk Phos      35 - 104 U/L     ALT      0 - 32 U/L     AST      0 - 31 U/L     Bilirubin      0.0 - 1.2 mg/dL     Bilirubin, Direct      0.0 - 0.3 mg/dL     Bilirubin, Indirect      0.0 - 1.0 mg/dL     CHOLESTEROL, TOTAL, [id number]      0 - 199 mg/dL     Triglycerides      0 - 149 mg/dL     HDL Cholesterol      >40 mg/dL     LDL Calculated      0 - 99 mg/dL     VLDL Cholesterol Calculated      mg/dL     TSH      0.270 - 4.200 uIU/mL 0.657    T4 Free      0.93 - 1.70 ng/dL         Problem list reviewed and updated in full with patient today as necessary.   A comprehensive ROS was negative, except as documented above. No current outpatient medications on file. No Known Allergies    Pt's past medical and surgical history were reviewed and updated as necessary today   Pt's family and social history were reviewed and updated as necessary today          Vitals:    09/22/22 1422   BP: 110/68   Pulse: 86   Resp: 18   Temp: 96.8 °F (36 °C)   TempSrc: Temporal   SpO2: 97%   Weight: 192 lb (87.1 kg)   Height: 5' 7\" (1.702 m)       Physical Exam  Constitutional:       Appearance: Normal appearance. HENT:      Head: Normocephalic and atraumatic. Eyes:      Conjunctiva/sclera: Conjunctivae normal.   Cardiovascular:      Rate and Rhythm: Normal rate and regular rhythm. Heart sounds: Normal heart sounds. Pulmonary:      Effort: Pulmonary effort is normal.      Breath sounds: Normal breath sounds. Abdominal:      Palpations: Abdomen is soft. Tenderness: There is no abdominal tenderness. Musculoskeletal:         General: Normal range of motion. Skin:     General: Skin is warm and dry. Neurological:      General: No focal deficit present. Mental Status: She is alert and oriented to person, place, and time. Psychiatric:         Mood and Affect: Mood normal.         Behavior: Behavior normal.     Pt reported feeling episode of her described hot flashes during our OV  She paused talked, and started taking focused breaths as if centering herself  This lasted about 30 seconds or so then she stated she felt better  There was no visible perspiration on exam after  Pt's skin felt cool, normal       Counseled patient as appropriate and relevant regarding above diagnosis, including possible risks and complications, especially if left uncontrolled. Counseled patient as appropriate and relevant regarding any  possible side effects, risks, and alternatives to treatment; patient and/or guardian verbalizes understanding, and is in agreement with the plan as detailed above. Reviewed age and gender appropriate health screening exams and vaccinations. Advised patient regarding importance of keeping up with recommended health maintenance and to schedule as soon as possible if overdue, as this is important in assessing for undiagnosed pathology, especially cancer, as well as protecting against potentially harmful/life threatening disease. If discussed, any educational materials and/or home exercises printed for patient's review and were included in patient instructions on his/her After Visit Summary and given to patient at the end of visit. Advised patient to call with any new medication issues, and and other concerns/complaints prior to scheduled follow up. All questions answered to the patient's satisfaction.         Seen By:  Joey Mtz MD

## 2022-09-23 LAB
C-REACTIVE PROTEIN: 0.6 MG/DL (ref 0–0.4)
HEPATITIS C ANTIBODY INTERPRETATION: NORMAL
HIV-1 AND HIV-2 ANTIBODIES: NORMAL
T4 FREE: 0.88 NG/DL (ref 0.93–1.7)
TSH SERPL DL<=0.05 MIU/L-ACNC: 0.45 UIU/ML (ref 0.27–4.2)

## 2022-09-26 DIAGNOSIS — R23.2 HOT FLASHES: Primary | ICD-10-CM

## 2022-09-26 DIAGNOSIS — R61 NIGHT SWEATS: ICD-10-CM

## 2022-09-26 DIAGNOSIS — Z01.419 ENCOUNTER FOR GYNECOLOGICAL EXAMINATION: ICD-10-CM

## 2022-09-26 PROBLEM — Z3A.39 39 WEEKS GESTATION OF PREGNANCY: Status: RESOLVED | Noted: 2017-12-11 | Resolved: 2022-09-26

## 2022-09-26 PROBLEM — Z34.90 PREGNANCY: Status: RESOLVED | Noted: 2017-09-24 | Resolved: 2022-09-26

## 2022-09-27 ENCOUNTER — CARE COORDINATION (OUTPATIENT)
Dept: CARE COORDINATION | Age: 42
End: 2022-09-27

## 2022-09-27 NOTE — CARE COORDINATION
Attempted to contact pt on mobile number listed for CC f/u, message states \"the call cannot be completed as dialed. \"   ACM attempted to contact pt on home number listed for pt, message states, \"subscriber you're trying to reach is not available. \" Will try to reach pt another time.

## 2022-10-04 ENCOUNTER — CARE COORDINATION (OUTPATIENT)
Dept: CARE COORDINATION | Age: 42
End: 2022-10-04

## 2022-10-04 NOTE — CARE COORDINATION
Attempted to contact pt for CC f/u, mobile number which is preferred to call by has a message that states \"your call cannot be completed as dialed. \" Will attempt to reach pt on home number listed. Attempted to contact pt on home number listed for pt. Message states: \"the text mail subscriber you are trying to reach is not available. \" Will try to reach pt another time.

## 2022-10-07 NOTE — ED NOTES
1. Diet as tolerated except for a  low fat diet after laparoscopic cholecystectomy. 2. Showering is allowed, but no tub baths, hot tubs or swimming. 3. Drainage is common from the wounds. Change the dressings as needed. Call our office if the wounds become reddened, tender, feel warm to the touch or pus starts to drain from the wound. 4. Take prescribed pain medication as directed, usually Percocet, Norco, Ultram or Dilaudid. Take over the counter medication for minor pain. 5. Ice may be applied intermittently to the surgical site or sites. 6. Call or office, [phone], if problems arise. 7. Follow up in the office at the assigned time. 8. Resume all medications as taken per surgery, unless specifically instructed not to take certain ones. 9. No lifting more than 25 pounds until told otherwise. 10. Driving is allowed 3 days after surgery as long as you feel comfortable enough to drive and have not taken any prescription pain medication prior to driving. After general anesthesia or intravenous sedation, for 24 hours or while taking prescription Narcotics:  Limit your activities  A responsible adult needs to be with you for the next 24 hours  Do not drive and operate hazardous machinery  Do not make important personal or business decisions  Do not drink alcoholic beverages  If you have not urinated within 8 hours after discharge, and you are experiencing discomfort from urinary retention, please go to the nearest ED. If you have sleep apnea and have a CPAP machine, please use it for all naps and sleeping. Please use caution when taking narcotics and any of your home medications that may cause drowsiness. *  Please give a list of your current medications to your Primary Care Provider. *  Please update this list whenever your medications are discontinued, doses are      changed, or new medications (including over-the-counter products) are added.   *  Please carry medication Department of Emergency Medicine  FIRST PROVIDER ELOPEMENT NOTE                 [facility]          6/9/22  2:18 PM EDT    MRN: [id number]    HPI: James Lilly is a 43 y.o. female who presents to the ED with the following complaint: Emesis (x4 days for both complaints) and Emesis      (Please refer to [address] [place] [address] for pertinent ROS and PE documentation)  Labs:  No results found for this visit on 06/09/22. Imaging: All Radiology results interpreted by Radiologist unless otherwise noted. No orders to display     ED Course    Medications - No data to display     -------------------------  Disposition    Patient ELOPED from the department prior to completion of evaluation after triage. Results of triage orders that were completed at time of elopement as indicated above were reviewed.     Diagnosis at Time of Elopement: (Based on presenting complaint/available test results):   Emesis    Electronically signed by ALEJANDRO Guillen   DD: 6/9/22       ALEJANDRO Haro  06/09/22 3818 information at all times in case of emergency situations. These are general instructions for a healthy lifestyle:  No smoking/ No tobacco products/ Avoid exposure to second hand smoke  Surgeon General's Warning:  Quitting smoking now greatly reduces serious risk to your health. Obesity, smoking, and sedentary lifestyle greatly increases your risk for illness  A healthy diet, regular physical exercise & weight monitoring are important for maintaining a healthy lifestyle    You may be retaining fluid if you have a history of heart failure or if you experience any of the following symptoms:  Weight gain of 3 pounds or more overnight or 5 pounds in a week, increased swelling in our hands or feet or shortness of breath while lying flat in bed. Please call your doctor as soon as you notice any of these symptoms; do not wait until your next office visit.

## 2022-10-11 ENCOUNTER — CARE COORDINATION (OUTPATIENT)
Dept: CARE COORDINATION | Age: 42
End: 2022-10-11

## 2022-10-11 NOTE — CARE COORDINATION
Ambulatory Care Coordination Note  10/11/2022    ACC: Edgard Horner, RN      *CC f/u  -Pt reports doing good currently, continues to have night sweats. Scheduled for TB testing [email or web address], pt plans to attend  -Inquired about if pt's preferred number ([phone]) was not working, pt reports that it is currently not working but she plans to keep the number for when it is back in service  -Not currently prescribed any medications  -Pt was to see Dr Katrina Pena, reports that she missed said appt on 10/3 d/t the bus not getting to the destination on time. ACM inquired about pt's transportation via [employer], pt reports she was told she needed to schedule a month in advance, ACM discussed the [place] as an option but would need to be scheduled at the time of her scheduling her appts, verbalized understanding. Pt reports that she has the number to Dr Smitha Seymour office and will call to r/s appt  -Reminded pt of her GYN appt with Dr Hilda Best on [email or web address], pt reports she plans to attend  -ACM re-reminded pt to schedule the [place] [facility] at the time of scheduling said appts, verbalized understanding  -Denies any current needs for ACM    *Plan  -Care coordination  -Pt to attend lab/MA visit appt 10/14  -Pt to contact Dr Smitha Seymour office and to r/s missed appt and ask for the [place] for said appt  -ACM will f/u in about 2 weeks to check progress and assess needs, pt to contact ACM if any needs arise, has contact information    Offered patient enrollment in the Remote Patient Monitoring (RPM) program for in-home monitoring: Patient is not eligible for RPM program.    Lab Results       None            Care Coordination Interventions    Referral from Primary Care Provider: No  Suggested Interventions and Community Resources  Fall Risk Prevention: Not Started (Comment: 8/16)  Pharmacist: Not Started (Comment: 8/16)  Social Work: Completed (Comment: 8/16)  Other Services:  In Process  Transportation Support: Completed Goals Addressed                   This Visit's Progress     Conditions and Symptoms   On track     I will schedule office visits, as directed by my provider. I will keep my appointment or reschedule if I have to cancel. I will notify my provider of any barriers to my plan of care. I will follow my Zone Management tool to seek urgent or emergent care. I will notify my provider of any symptoms that indicate a worsening of my condition. Barriers: lack of motivation, financial, lack of support, and lack of education  Plan for overcoming my barriers: ACM will educate pt on where to fo according to symptoms, keeping all scheduled appts, and knowing where to go according to symptoms.   Confidence: 4/10  Anticipated Goal Completion Date: 12/2/2022                Prior to Admission medications    Not on File       Future Appointments   Date Time Provider Pb Cuellar   10/14/2022  1:30 PM SCHEDULE, Bob [facility]   10/26/2022  1:00 PM Aileen Martin MD [facility]    and   General Assessment    Do you have any symptoms that are causing concern?: No

## 2022-10-14 ENCOUNTER — NURSE ONLY (OUTPATIENT)
Dept: PRIMARY CARE CLINIC | Age: 42
End: 2022-10-14
Payer: MEDICARE

## 2022-10-14 DIAGNOSIS — Z11.1 VISIT FOR MANTOUX TEST: Primary | ICD-10-CM

## 2022-10-14 PROCEDURE — 86580 TB INTRADERMAL TEST: CPT | Performed by: INTERNAL MEDICINE

## 2022-10-17 ENCOUNTER — NURSE ONLY (OUTPATIENT)
Dept: PRIMARY CARE CLINIC | Age: 42
End: 2022-10-17

## 2022-10-17 LAB
INDURATION: 0
TB SKIN TEST: NORMAL

## 2022-10-24 ENCOUNTER — CARE COORDINATION (OUTPATIENT)
Dept: CARE COORDINATION | Age: 42
End: 2022-10-24

## 2022-10-24 NOTE — CARE COORDINATION
Ambulatory Care Coordination Note  10/24/2022    ACC: Cherrymartínez Horner, RN      *CC f/u  -Pt reports doing good currently, denies any issues, needs, or concerns at this time  -Not currently prescribed any medications   -Pt inquired about where her GYN appt is on [facility]icate [email or web address]. ACM gave pt the address and the PCP's name as requested by pt. Pt reports that she plans to attend     *Plan  -Care coordination   -Pt to attend to GYN appt 10/26  -ACM will f/u in about 2-3 weeks to check progress and assess needs, pt to contact ACM if any needs arise, has contact information    Offered patient enrollment in the Remote Patient Monitoring (RPM) program for in-home monitoring: Patient is not eligible for RPM program. No diagnosis for pt to be eligible. Lab Results       None            Care Coordination Interventions    Referral from Primary Care Provider: No  Suggested Interventions and Community Resources  Fall Risk Prevention: Not Started (Comment: 8/16)  Pharmacist: Not Started (Comment: 8/16)  Social Work: Completed (Comment: 8/16)  Other Services: In Process  Transportation Support: Completed          Goals Addressed                   This Visit's Progress     Conditions and Symptoms   On track     I will schedule office visits, as directed by my provider. I will keep my appointment or reschedule if I have to cancel. I will notify my provider of any barriers to my plan of care. I will follow my Zone Management tool to seek urgent or emergent care. I will notify my provider of any symptoms that indicate a worsening of my condition. Barriers: lack of motivation, financial, lack of support, and lack of education  Plan for overcoming my barriers: ACM will educate pt on where to fo according to symptoms, keeping all scheduled appts, and knowing where to go according to symptoms.   Confidence: 4/10  Anticipated Goal Completion Date: 12/2/2022                Prior to Admission medications    Not on File       Future Appointments   Date Time Provider Pb Cuellar   10/26/2022  1:00 PM Destini Chacok MD [facility]   10/28/2022  1:40 PM SCHEDULE, GRANT SORIANO CHARLI    and   General Assessment    Do you have any symptoms that are causing concern?: No

## 2022-10-26 ENCOUNTER — OFFICE VISIT (OUTPATIENT)
Dept: OBGYN | Age: 42
End: 2022-10-26
Payer: MEDICARE

## 2022-10-26 VITALS
WEIGHT: 196 LBS | DIASTOLIC BLOOD PRESSURE: 70 MMHG | RESPIRATION RATE: 16 BRPM | SYSTOLIC BLOOD PRESSURE: 134 MMHG | BODY MASS INDEX: 29.7 KG/M2 | HEIGHT: 68 IN | HEART RATE: 78 BPM

## 2022-10-26 DIAGNOSIS — R68.89 OTHER GENERAL SYMPTOMS AND SIGNS: ICD-10-CM

## 2022-10-26 DIAGNOSIS — R30.0 DYSURIA: ICD-10-CM

## 2022-10-26 DIAGNOSIS — Z01.419 WELL WOMAN EXAM WITH ROUTINE GYNECOLOGICAL EXAM: Primary | ICD-10-CM

## 2022-10-26 DIAGNOSIS — Z12.4 PAP SMEAR FOR CERVICAL CANCER SCREENING: ICD-10-CM

## 2022-10-26 DIAGNOSIS — Z12.31 ENCOUNTER FOR SCREENING MAMMOGRAM FOR BREAST CANCER: ICD-10-CM

## 2022-10-26 DIAGNOSIS — R23.2 VASOMOTOR FLUSHING: ICD-10-CM

## 2022-10-26 DIAGNOSIS — Z01.419 WELL WOMAN EXAM WITH ROUTINE GYNECOLOGICAL EXAM: ICD-10-CM

## 2022-10-26 PROCEDURE — 99203 OFFICE O/P NEW LOW 30 MIN: CPT | Performed by: OBSTETRICS & GYNECOLOGY

## 2022-10-26 PROCEDURE — 99386 PREV VISIT NEW AGE 40-64: CPT | Performed by: OBSTETRICS & GYNECOLOGY

## 2022-10-26 RX ORDER — NITROFURANTOIN 25; 75 MG/1; MG/1
100 CAPSULE ORAL 2 TIMES DAILY
Qty: 14 CAPSULE | Refills: 0 | Status: SHIPPED | OUTPATIENT
Start: 2022-10-26 | End: 2022-11-02

## 2022-10-26 NOTE — PROGRESS NOTES
Chief complaint: 43 year- old presents for well woman exam. This is a new patient to me as well as to the [address] [facility]. History:    G3, P2,Ab1. Doing well. Periods:  last menses may have been in 2020. Sexually active: yes . No abdominal or pelvic pain. No dyspareunia. No abnormal vaginal  discharge. Previous Pap smears normal. Last Pap smear many years ago. .  No urinary or GI symptoms. Medical/ surgical history and medications reviewed. Needs mammogram, will order,  Night sweats since July  Dysuria, worried re : bladder infection    ROS:Negative    Examination:    Vital signs reviewed. GA: Healthy. Oriented x 3 no distress. HEENT: hearing grossly intact, no jaundice, no oral lesions or nasal discharge. Neck: Supple, no masses or lymphadenopathy. Thyroid: normal, no goiter. Breasts: no masses. No skin changes or lymphadenopathy. No nipple discharge. Abdomen: Soft. No masses. No organomegaly. Vulva:Normal  Vagina:Clear  Urethra:Normal appearing  Bladder:Normal, no prolapse, very tender to palpation  Pelvic Support  Cervix: No lesions or mucopurulent discharge, pap smear done as a TPP with co-testing. Uterus: Normal size. Adnexa: Free, no masses or tenderness. Perineum:Normal  Anus:Normal    IMP: well woman, enc for mamm, pap for cancer screening, probable uti, vasomotor symptoms. PLAN: Pap, mammogram, urine culture. FSH, TSH, Rx macrobid for UTI, RTC 3 weeks for lab results for the hot flashes.

## 2022-10-26 NOTE — PROGRESS NOTES
New patient today. Pap done mammogram ordered Urine culture sent to the lab. Treated with macrobid  Labs drawn at the lab  See back in 3 weeks to review all.

## 2022-10-28 LAB — URINE CULTURE, ROUTINE: NORMAL

## 2022-10-31 LAB
HPV SAMPLE: ABNORMAL
HPV TYPE 16: NOT DETECTED
HPV TYPE 18: NOT DETECTED
HPV, HIGH RISK OTHER: DETECTED
INTERPRETATION: ABNORMAL
SOURCE: ABNORMAL

## 2022-11-07 ENCOUNTER — CARE COORDINATION (OUTPATIENT)
Dept: CARE COORDINATION | Age: 42
End: 2022-11-07

## 2022-11-08 NOTE — CARE COORDINATION
Ambulatory Care Coordination Note  11/8/2022    ACC: Manisha Horner, RN      *CC f/u  -Pt reports doing ok, having some left leg pain  -Discussed with the pt the importance of r/s appt with Dr Ana Matson, pt reports that she needs transportation as it was too far for her. ACM re-reviewed notes from SW of pt scheduling transportation with [place] and/or [employer], pt reports that she has to schedule a week in advance, ACM discussed that at the time of scheduling any MH appts, she could ask for [employer] transportation or she could ensure that her appts are scheduled at least 1 week away in order to schedule [employer] or [address] transportation. Pt verbalized understanding, reports that she may also call PCP's office to see about being referred to someone closer to her, ACM again re-educated pt on how to properly schedule transportation for her appts   -Scheduled to see GYN again on 11/14, @2pm, reports that she will take the bus for said visit as the bus will drop her off at the building    *Plan  -Care coordination  -Pt to contact Dr Onel Olsen office to see if the [employer] could be set up for her appt or pt is to contact [address] [place] [address]  or [employer] for transportation  -ACM will f/u in about 1-2 weeks to check progress and assess needs, pt to contact ACM if any needs arise, has contact information      Offered patient enrollment in the Remote Patient Monitoring (RPM) program for in-home monitoring: Patient is not eligible for RPM program.    Lab Results       None            Care Coordination Interventions    Referral from Primary Care Provider: No  Suggested Interventions and Community Resources  Fall Risk Prevention: Not Started (Comment: 8/16)  Pharmacist: Not Started (Comment: 8/16)  Social Work: Completed (Comment: 8/16)  Other Services: In Process  Transportation Support: Completed          Goals Addressed                   This Visit's Progress     Conditions and Symptoms   Worsening     I will schedule office visits, as directed by my provider.   I will keep my appointment or reschedule if I have to cancel. I will notify my provider of any barriers to my plan of care. I will follow my Zone Management tool to seek urgent or emergent care. I will notify my provider of any symptoms that indicate a worsening of my condition. Barriers: lack of motivation, financial, lack of support, and lack of education  Plan for overcoming my barriers: ACM will educate pt on where to fo according to symptoms, keeping all scheduled appts, and knowing where to go according to symptoms.   Confidence: 4/10  Anticipated Goal Completion Date: 12/2/2022                Prior to Admission medications    Not on File       Future Appointments   Date Time Provider Pb Cuellar   11/14/2022  2:00 PM Jose M Helms  [address]    and   General Assessment    Do you have any symptoms that are causing concern?: No

## 2022-11-09 ENCOUNTER — TELEPHONE (OUTPATIENT)
Dept: PRIMARY CARE CLINIC | Age: 42
End: 2022-11-09

## 2022-11-09 DIAGNOSIS — M94.262 CHONDROMALACIA OF KNEE, LEFT: Primary | ICD-10-CM

## 2022-11-16 ENCOUNTER — TELEPHONE (OUTPATIENT)
Dept: PRIMARY CARE CLINIC | Age: 42
End: 2022-11-16

## 2022-11-16 ENCOUNTER — APPOINTMENT (OUTPATIENT)
Dept: GENERAL RADIOLOGY | Age: 42
End: 2022-11-16
Payer: MEDICARE

## 2022-11-16 ENCOUNTER — HOSPITAL ENCOUNTER (OUTPATIENT)
Age: 42
Discharge: HOME | End: 2022-11-16
Payer: MEDICARE

## 2022-11-16 ENCOUNTER — HOSPITAL ENCOUNTER (EMERGENCY)
Age: 42
Discharge: HOME | End: 2022-11-16
Payer: MEDICARE

## 2022-11-16 ENCOUNTER — HOSPITAL ENCOUNTER (OUTPATIENT)
Dept: GENERAL RADIOLOGY | Age: 42
Discharge: HOME | End: 2022-11-18
Payer: MEDICARE

## 2022-11-16 ENCOUNTER — HOSPITAL ENCOUNTER (OUTPATIENT)
Age: 42
Discharge: HOME | End: 2022-11-18
Payer: MEDICARE

## 2022-11-16 VITALS
OXYGEN SATURATION: 100 % | HEART RATE: 71 BPM | TEMPERATURE: 97.6 F | DIASTOLIC BLOOD PRESSURE: 81 MMHG | RESPIRATION RATE: 20 BRPM | SYSTOLIC BLOOD PRESSURE: 112 MMHG

## 2022-11-16 DIAGNOSIS — Z01.419 WELL WOMAN EXAM WITH ROUTINE GYNECOLOGICAL EXAM: ICD-10-CM

## 2022-11-16 DIAGNOSIS — R61 NIGHT SWEATS: ICD-10-CM

## 2022-11-16 DIAGNOSIS — M25.462 KNEE EFFUSION, LEFT: ICD-10-CM

## 2022-11-16 DIAGNOSIS — R68.89 OTHER GENERAL SYMPTOMS AND SIGNS: ICD-10-CM

## 2022-11-16 DIAGNOSIS — M25.562 ACUTE PAIN OF LEFT KNEE: Primary | ICD-10-CM

## 2022-11-16 DIAGNOSIS — R23.2 VASOMOTOR FLUSHING: ICD-10-CM

## 2022-11-16 DIAGNOSIS — M17.12 OSTEOARTHRITIS OF LEFT KNEE, UNSPECIFIED OSTEOARTHRITIS TYPE: ICD-10-CM

## 2022-11-16 LAB
FOLLICLE STIMULATING HORMONE: 7.5 MIU/ML
TSH SERPL DL<=0.05 MIU/L-ACNC: 1.17 UIU/ML (ref 0.27–4.2)

## 2022-11-16 PROCEDURE — 99283 EMERGENCY DEPT VISIT LOW MDM: CPT

## 2022-11-16 PROCEDURE — 71046 X-RAY EXAM CHEST 2 VIEWS: CPT

## 2022-11-16 PROCEDURE — 87088 URINE BACTERIA CULTURE: CPT

## 2022-11-16 PROCEDURE — 36415 COLL VENOUS BLD VENIPUNCTURE: CPT

## 2022-11-16 PROCEDURE — 87624 HPV HI-RISK TYP POOLED RSLT: CPT

## 2022-11-16 PROCEDURE — 73562 X-RAY EXAM OF KNEE 3: CPT

## 2022-11-16 PROCEDURE — 83001 ASSAY OF GONADOTROPIN (FSH): CPT

## 2022-11-16 PROCEDURE — 84443 ASSAY THYROID STIM HORMONE: CPT

## 2022-11-16 PROCEDURE — 6370000000 HC RX 637 (ALT 250 FOR IP): Performed by: NURSE PRACTITIONER

## 2022-11-16 RX ORDER — OXYCODONE HYDROCHLORIDE AND ACETAMINOPHEN 5; 325 MG/1; MG/1
1 TABLET ORAL EVERY 8 HOURS PRN
Qty: 9 TABLET | Refills: 0 | Status: SHIPPED | OUTPATIENT
Start: 2022-11-16 | End: 2022-11-19

## 2022-11-16 RX ORDER — IBUPROFEN 800 MG/1
800 TABLET ORAL ONCE
Status: COMPLETED | OUTPATIENT
Start: 2022-11-16 | End: 2022-11-16

## 2022-11-16 RX ORDER — OXYCODONE HYDROCHLORIDE AND ACETAMINOPHEN 5; 325 MG/1; MG/1
1 TABLET ORAL ONCE
Status: COMPLETED | OUTPATIENT
Start: 2022-11-16 | End: 2022-11-16

## 2022-11-16 RX ORDER — NAPROXEN 500 MG/1
500 TABLET ORAL 2 TIMES DAILY PRN
Qty: 14 TABLET | Refills: 0 | Status: SHIPPED | OUTPATIENT
Start: 2022-11-16 | End: 2022-11-23

## 2022-11-16 RX ADMIN — OXYCODONE AND ACETAMINOPHEN 1 TABLET: 5; 325 TABLET ORAL at 13:43

## 2022-11-16 RX ADMIN — IBUPROFEN 800 MG: 800 TABLET, FILM COATED ORAL at 13:43

## 2022-11-16 ASSESSMENT — PAIN SCALES - GENERAL: PAINLEVEL_OUTOF10: 7

## 2022-11-16 ASSESSMENT — PAIN DESCRIPTION - ORIENTATION: ORIENTATION: LEFT

## 2022-11-16 ASSESSMENT — PAIN DESCRIPTION - LOCATION: LOCATION: KNEE

## 2022-11-16 NOTE — ED PROVIDER NOTES
[address]  Department of Emergency Medicine   ED  Encounter Note  Admit Date/RoomTime: 2022  2:46 PM  ED Room: [place] [address]/    NAME: Idalia Olson  : 1980  MRN: [id number]     Chief Complaint:  Knee Pain (L knee x 2 weeks; denies injury; states she has had it drained before-septic arthritis )    History of Present Illness       Idalia Olson is a 43 y.o. old female who presents to the emergency department by private vehicle with a friend, for non-traumatic pain and swelling located left supra patellar region which occured 2 week(s) prior to arrival.  The complaint is due to no known cause. Since onset the symptoms have been persistent and gradually worsening. Patient  has a prior history of pain to mentioned area related to today's visit and states the last time her knee swelled in April they drained the fluid off and never followed up with orthopedic surgeon because it was not close to her home. Her pain is aggraveated by bending and weight bearing and relieved by nothing, as no treatment has been provided prior to this visit. Her weight bearing ability is: abnormal. She denies any fever, chills, wounds, rash, or calf pain. Tetanus Status: up to date. ROS   Pertinent positives and negatives are stated within HPI, all other systems reviewed and are negative. Past Medical History:  has a past medical history of Anxiety and depression, Breast disorder, Cancer ([address] Cervical cancer ([address] Depression, Eclampsia, Herpes simplex virus (HSV) infection, Hypothyroid, Mental disorder, Seizures ([address] and Thyroid disease. Surgical History:  has a past surgical history that includes Leg Surgery (Right); Inner ear surgery (Left); Cervix surgery; Breast lumpectomy (Bilateral); and Dilation and curettage of uterus. Social History:  reports that she has quit smoking.  She has never used smokeless tobacco. She reports that she does not currently use alcohol. She reports that she does not use drugs. Family History: family history is not on file. Allergies: Patient has no known allergies. Physical Exam   Oxygen Saturation Interpretation: Normal.        ED Triage Vitals   BP Temp Temp Source Heart Rate Resp SpO2 Height Weight   11/16/22 1331 11/16/22 1336 11/16/22 1336 11/16/22 1331 11/16/22 1331 11/16/22 1331 -- --   112/81 97.6 °F (36.4 °C) Oral 71 20 100 %           Constitutional:  Alert, development consistent with age. Neck:  Normal ROM. Supple. Physical Exam  Left Knee: suprapatellar, patellar            Tenderness:  moderate to the suprapatellar region. Swelling/Effusion: Moderate to the suprapatellar region. Deformity: no deformity observed/palpated. ROM: diminished range with pain. Skin:  no wounds or erythema. Drawer's:  Not Tested due to pain. Lachman's: Unable to Assess. Apley's: Unable to Assess. Wade's: Unable to Assess. Valgus/Varus Stress: Unable to Assess due to pain. Crepitus: Positive. Hip:            Tenderness:  none. Swelling: None. Deformity: no.              ROM: full range of motion. Skin:  no wounds, erythema, or swelling. Joint(s) Above/Below: ankle and foot. Tenderness:  none. Swelling: No.              Deformity: no deformity observed/palpated. ROM: full range of motion. Skin:  no wounds, erythema, or swelling. Neurovascular: Motor deficit: none. Full flexion extension at the foot and ankle. Achilles tendon intact. Sensory deficit: none. Sensation intact to light touch in distal toes. Pulse deficit: none. 2Plus pedal posterior tibial pulses intact. Capillary refill: normal.  Less than 3 seconds in distal foot. Gait: Ambulates with a limp. Latif Colon   Lymphatics: No lymphangitis or adenopathy noted. Neurological:  Oriented. Motor functions intact. Lab / Imaging Results   (All laboratory and radiology results have been personally reviewed by myself)  Labs:  No results found for this visit on 11/16/22. Imaging: All Radiology results interpreted by Radiologist unless otherwise noted. XR KNEE LEFT (3 VIEWS)   Final Result   Osteoarthritis and moderate to large knee joint effusion. ED Course / Medical Decision Making     Medications   ibuprofen (ADVIL;MOTRIN) tablet 800 mg (800 mg Oral Given 11/16/22 1343)   oxyCODONE-acetaminophen (PERCOCET) 5-325 MG per tablet 1 tablet (1 tablet Oral Given 11/16/22 1343)      1446 results with patient also advised on findings if she develops any fever, chills or any redness to the knee she should return to the ED immediately. She did not want to follow-up with Dr. Liset Benitez and wanted somebody closer in Dr. Omega Shahid is on-call and she would like to go [place]. Feels her pain is improved but not resolved. Consult(s):   None    Procedure(s):   None. MDM:     Imaging was obtained based on moderate suspicion for joint effusion as per history/physical findings. Reveals large to moderate joint effusion. Patient has a history of having a large effusion back in April which she had drained and had no crystals and had no sepsis and was supposed to follow-up with Dr. Liset Benitez. Patient would like to have an orthopedic that is closer to her home and does not want to travel to [facility]. She will be given Dr. Omega Shahid who is on-call today. Patient was medicated with Motrin and Percocet had moderate relief and Ace wrap applied. Patient does not appear to have a septic joint at this time she is afebrile, nontoxic in appearance hemodynamically stable. She has no erythema or warmth to the joint. She is able to bend the knee 20 to 30 degrees. She has intact pulses she has no calf tenderness soft compressible compartments intact.   Patient was advised on signs and symptoms warranting immediate return such as fever, chills, redness or streaking up the leg. She has no systemic symptoms at this time. She was advised on precautions while taking opioids not to drink alcohol, drive, operate heavy equipment or climb ladders. She did not need a work excuse. Plan is subsequently for symptom control, limited use and immobilization with outpatient follow-up with primary orthopaedist as instructed in d/c instructions. Controlled Substance Monitoring:    Acute and Chronic Pain Monitoring:   RX Monitoring 11/16/2022   Periodic Controlled Substance Monitoring Possible medication side effects, risk of tolerance/dependence & alternative treatments discussed. ;No signs of potential drug abuse or diversion identified. Plan of Care/Counseling:  CARYL Kat CNP reviewed today's visit with the patient in addition to providing specific details for the plan of care and counseling regarding the diagnosis and prognosis. Questions are answered at this time and are agreeable with the plan. Assessment      1. Acute pain of left knee    2. Knee effusion, left    3. Osteoarthritis of left knee, unspecified osteoarthritis type      Plan   Discharged home. Patient condition is good    New Medications     New Prescriptions    NAPROXEN (NAPROSYN) 500 MG TABLET    Take 1 tablet by mouth 2 times daily as needed for Pain (take with food and full glass of water.)    OXYCODONE-ACETAMINOPHEN (PERCOCET) 5-325 MG PER TABLET    Take 1 tablet by mouth every 8 hours as needed (Pain) for up to 3 days. Electronically signed by CARYL Kat CNP   DD: 11/16/22  **This report was transcribed using voice recognition software. Every effort was made to ensure accuracy; however, inadvertent computerized transcription errors may be present.   END OF ED PROVIDER NOTE      CARYL Kat CNP  11/16/22 7577

## 2022-11-17 ENCOUNTER — CARE COORDINATION (OUTPATIENT)
Dept: CARE COORDINATION | Age: 42
End: 2022-11-17

## 2022-11-17 ENCOUNTER — TELEPHONE (OUTPATIENT)
Dept: ORTHOPEDIC SURGERY | Age: 42
End: 2022-11-17

## 2022-11-17 NOTE — TELEPHONE ENCOUNTER
ED 11/16/22 J[facility] on call    Chief Complaint:  Knee Pain (L knee x 2 weeks; denies injury; states she has had it drained before-septic arthritis )    Imaging: All Radiology results interpreted by Radiologist unless otherwise noted. XR KNEE LEFT (3 VIEWS)   Final Result   Osteoarthritis and moderate to large knee joint effusion. Routing to provider for guidance/scheduling rec.

## 2022-11-17 NOTE — TELEPHONE ENCOUNTER
Patient calling to follow up on ED f/u request. Advise patient office will be in contact after reviewing ED visit notes for appropriate scheduling.  Patient expressed understanding

## 2022-11-17 NOTE — TELEPHONE ENCOUNTER
Pt called in to [facility] an ED F/U for acute LT knee pain. Naya Romo can be reached at [phone]. Thank you.

## 2022-11-17 NOTE — CARE COORDINATION
Ambulatory Care Coordination Note  11/17/2022    ACC: Manisha Horner, RN      *CC f/u/ [facility] ED f/u 11/16/22  -Pt seen at [facility] ([facility] ED 11/16 for left knee pain. Pt prescribed Naproxen and Percocet. Pt reports having both medications. Reports that she plans to only take the Naproxen d/t Percocet making her sick to her stomach. Reports that her pain has improved some. ACM noted pt contacted Dr Orbie Lefort office today for a f/u appt. Reports that she is waiting to be scheduled. ACM reminded pt to notify the office the she would like to be seen on [place] d/t transportation restrictions, verbalized understanding  -Denies any other issues or needs at this time    *Plan  -Care coordination   -Pt to attend ortho appt once scheduled  -ACM will f/u in about 1 week to check progress, assess needs, and to check if [pt has been scheduled with ortho pt to contact ACM if any needs arise, has contact information    Offered patient enrollment in the Remote Patient Monitoring (RPM) program for in-home monitoring: Patient is not eligible for RPM program. Pt doesn't have a qualifying health condition to be monitored at this time. Lab Results       None            Care Coordination Interventions    Referral from Primary Care Provider: No  Suggested Interventions and Community Resources  Fall Risk Prevention: Not Started (Comment: 8/16)  Pharmacist: Not Started (Comment: 8/16)  Social Work: Completed (Comment: 8/16)  Other Services: In Process  Transportation Support: Completed          Goals Addressed                   This Visit's Progress     Conditions and Symptoms   On track     I will schedule office visits, as directed by my provider. I will keep my appointment or reschedule if I have to cancel. I will notify my provider of any barriers to my plan of care. I will follow my Zone Management tool to seek urgent or emergent care. I will notify my provider of any symptoms that indicate a worsening of my condition.     Barriers: lack of motivation, financial, lack of support, and lack of education  Plan for overcoming my barriers: ACM will educate pt on where to fo according to symptoms, keeping all scheduled appts, and knowing where to go according to symptoms. Confidence: 4/10  Anticipated Goal Completion Date: 12/2/2022                Prior to Admission medications    Medication Sig Start Date End Date Taking? Authorizing Provider   naproxen (NAPROSYN) 500 MG tablet Take 1 tablet by mouth 2 times daily as needed for Pain (take with food and full glass of water.) 11/16/22 11/23/22 Yes CARYL Puri CNP   oxyCODONE-acetaminophen (PERCOCET) 5-325 MG per tablet Take 1 tablet by mouth every 8 hours as needed (Pain) for up to 3 days.   Patient not taking: Reported on 11/17/2022 11/16/22 11/19/22  CARYL Puri CNP       Future Appointments   Date Time Provider Pb Cuellar   12/9/2022  9:30 AM Hayde Mackenzie MD [facility]    and   General Assessment    Do you have any symptoms that are causing concern?: No

## 2022-11-18 ENCOUNTER — TELEPHONE (OUTPATIENT)
Dept: ORTHOPEDIC SURGERY | Age: 42
End: 2022-11-18

## 2022-11-18 LAB — URINE CULTURE, ROUTINE: NORMAL

## 2022-11-18 NOTE — TELEPHONE ENCOUNTER
Call placed to [facility] with Dr. Kirsty Hernandez office. Inquired about Dr. Kirsty Hernandez availability. [facility] stated he will call patient to see if patient would like to schedule with them.

## 2022-11-18 NOTE — TELEPHONE ENCOUNTER
I would have patient see Dr. Tangela Méndez early next week if able to have knee aspirated.    Electronically signed by Figueroa Cam PA-C on 11/18/2022 at 8:17 AM

## 2022-11-22 NOTE — TELEPHONE ENCOUNTER
Call to pt lvm advising per provider review recommend to f/u w/ Dr. Jayson Buchanan. Gave ph# and address.

## 2022-11-22 NOTE — TELEPHONE ENCOUNTER
Pt called in she stated she cannot go to [employer]. Gavi Alexandrebrandon can be reached at [phone]. Thank you.

## 2022-11-23 ENCOUNTER — CARE COORDINATION (OUTPATIENT)
Dept: CARE COORDINATION | Age: 42
End: 2022-11-23

## 2022-11-23 NOTE — CARE COORDINATION
Ambulatory Care Coordination Note  11/23/2022    ACC: Raulito Horner, RN      *CC f/u  -Pt reports doing ok currently  -ACM noted that Dr Francisco Campos office was trying to contact the pt, pt reports that she is unable to get transportation to [address] [place] [address] inquired about pt's concern of using [employer] provided transportation, pt reports that she was to give a 2 week notice for transportation. ACM asked if a 3 way call could be placed to inquire about the 2 week notice for transportation, pt agreeable  -3 way called placed to [facility], spoke with Bianca Wolfe who transferred [facility] and pt to the Dual Special Needs Population department for assistance. Spoke with [facility] who shared that pt has an [address] assigned, Carol Lopez, [employer] [phone]. Javed also notified ACM and pt that pt has 60 one way trips up to 60 miles per year through [facility] [phone]. Pt to call 48 hours prior to needed transportation to schedule. Pt will need the date and time of appt, address of destination. [facility] assisted pt with scheduling transportation for upcoming PCP appt on [email or web address] by [employer], pt's trip # is [phone]. ACM confirmed pt wrote down all of the above information  -Pt asked ACM to place a 3 way call to Dr Francisco Campos office to schedule an appt, called place, pt spoke with [facility] at Dr Francisco Campos.  Pt scheduled for Thursday [email or web address] Pt wrote appt down and was given the address to Dr Jairo Mendoza by [facility] and [employer] encouraged pt to call [facility] to schedule transportation for this appt, verbalized understanding  -Pt denies any other needs at this time    *Plna  -Care coordination  -Pt to contact [facility] to schedule transportation for 12/1 appt with Dr Oleta Severs  -Pt to attend all upcoming appts 12/1 & 12/9  -ACM will f/u in about 1 week to check progress, assess needs, ensure that pt scheduled said transportation for 12/1 and will plan to graduate pt at that time with update call to pt's [employer] CM, pt to contact ACM if any needs arise, has contact information    Offered patient enrollment in the Remote Patient Monitoring (RPM) program for in-home monitoring: Patient is not eligible for RPM program. No qualifying health condition to be monitored at this time. Lab Results       None            Care Coordination Interventions    Referral from Primary Care Provider: No  Suggested Interventions and Community Resources  Fall Risk Prevention: Not Started (Comment: 8/16)  Pharmacist: Not Started (Comment: 8/16)  Social Work: Completed (Comment: 8/16)  Other Services: In Process  Transportation Support: Completed          Goals Addressed                   This Visit's Progress     Conditions and Symptoms   On track     I will schedule office visits, as directed by my provider. I will keep my appointment or reschedule if I have to cancel. I will notify my provider of any barriers to my plan of care. I will follow my Zone Management tool to seek urgent or emergent care. I will notify my provider of any symptoms that indicate a worsening of my condition. Barriers: lack of motivation, financial, lack of support, and lack of education  Plan for overcoming my barriers: ACM will educate pt on where to fo according to symptoms, keeping all scheduled appts, and knowing where to go according to symptoms. Confidence: 4/10  Anticipated Goal Completion Date: 12/2/2022                Prior to Admission medications    Medication Sig Start Date End Date Taking?  Authorizing Provider   naproxen (NAPROSYN) 500 MG tablet Take 1 tablet by mouth 2 times daily as needed for Pain (take with food and full glass of water.) 11/16/22 11/23/22  CARYL Loo - CNP       Future Appointments   Date Time Provider Pb Cuellar   12/1/2022  1:30 PM Isela Kidd MD [facility]   12/9/2022  9:30 AM Iris Mcrae MD [facility]    and   General Assessment    Do you have any symptoms that are causing concern?: No

## 2022-11-23 NOTE — TELEPHONE ENCOUNTER
We can see her next week in office, no additional availability.  If symptoms worsen recommend ED at [address] [place] [address] over the weekend to have fluid drawn off knee if needed  Electronically signed by Gustavo Sharif PA-C on 11/23/2022 at 8:10 AM

## 2022-11-29 NOTE — TELEPHONE ENCOUNTER
Future Appointments   Date Time Provider Pb Alisa   12/1/2022  1:30 PM Kennedy De Anda MD [facility]   12/9/2022  9:30 AM MD Fabricio Stokes [address]     Patient has an appointment with Dr. José Luis Carbajal

## 2022-11-30 ENCOUNTER — CARE COORDINATION (OUTPATIENT)
Dept: CARE COORDINATION | Age: 42
End: 2022-11-30

## 2022-11-30 NOTE — CARE COORDINATION
Attempted to contact pt for CC f/u, no answer. Left a VM asking for a call back, contact information given. Also reminded pt of her appt with Dr Miramontes Comes on [email or web address].  Will try to reach pt another time. Darrel Ramesh amb

## 2022-11-30 NOTE — CARE COORDINATION
Ambulatory Care Coordination Note  11/30/2022    ACC: Manisha Horner, RN      *CC f/u  -Pt reports doing good  -Reminded pt of her upcoming appts, pt set up transportation for her appt 12/1 but reports that she will call [facility] transportation today as she gave the wrong address for said appt. Transportation for her PCP appt on 12/9 was set up at last outreach  -Pt not currently prescribed or taking any medications at this time  -ACM re-reviewed Walk-in care sites with pt and encouraged her to utilize walk-in care for non-emergent needs when unable to to see PCP, verbalized understanding and reports that she will use the [facility] care when needed instead of ED  -Discussed with pt ACM graduating pt from care coordination. Pt agreeable and has ACM's contact information if any needs arise in the future    *Plan  -Graduating pt from care coordination, goals met, education complete. Pt has transportation via [facility] and is aware of how to schedule transportation via them or asking at the time of scheduling [address] appts for the [address] PCP of pt graduating     Offered patient enrollment in the Remote Patient Monitoring (RPM) program for in-home monitoring:  Pt being graduated from care coordination and doesn't have a qualifying health condition for enrollment . Lab Results       None            Care Coordination Interventions    Referral from Primary Care Provider: No  Suggested Interventions and Community Resources  Fall Risk Prevention: Not Started (Comment: 8/16)  Pharmacist: Not Started (Comment: 8/16)  Social Work: Completed (Comment: 8/16)  Other Services: In Process  Transportation Support: Completed          Goals Addressed                   This Visit's Progress     COMPLETED: Conditions and Symptoms        I will schedule office visits, as directed by my provider. I will keep my appointment or reschedule if I have to cancel.   I will notify my provider of any barriers to my plan of care.  I will follow my Zone Management tool to seek urgent or emergent care. I will notify my provider of any symptoms that indicate a worsening of my condition. Barriers: lack of motivation, financial, lack of support, and lack of education  Plan for overcoming my barriers: ACM will educate pt on where to fo according to symptoms, keeping all scheduled appts, and knowing where to go according to symptoms. Confidence: 4/10  Anticipated Goal Completion Date: 12/2/2022                Prior to Admission medications    Medication Sig Start Date End Date Taking?  Authorizing Provider   naproxen (NAPROSYN) 500 MG tablet Take 1 tablet by mouth 2 times daily as needed for Pain (take with food and full glass of water.) 11/16/22 11/23/22  CARYL Waddell - CNP       Future Appointments   Date Time Provider Pb Cuellar   12/1/2022  1:30 PM Zaira Blanton MD [facility]   12/9/2022  9:30 AM Em Gonzalez MD [facility]    and   General Assessment    Do you have any symptoms that are causing concern?: No

## 2022-12-01 ENCOUNTER — OFFICE VISIT (OUTPATIENT)
Dept: ORTHOPEDIC SURGERY | Age: 42
End: 2022-12-01
Payer: MEDICARE

## 2022-12-01 VITALS — BODY MASS INDEX: 29.7 KG/M2 | HEIGHT: 68 IN | WEIGHT: 196 LBS

## 2022-12-01 DIAGNOSIS — G89.29 CHRONIC PAIN OF LEFT KNEE: Primary | ICD-10-CM

## 2022-12-01 DIAGNOSIS — M17.12 PRIMARY OSTEOARTHRITIS OF LEFT KNEE: ICD-10-CM

## 2022-12-01 DIAGNOSIS — M25.562 CHRONIC PAIN OF LEFT KNEE: Primary | ICD-10-CM

## 2022-12-01 PROCEDURE — 20610 DRAIN/INJ JOINT/BURSA W/O US: CPT | Performed by: FAMILY MEDICINE

## 2022-12-01 PROCEDURE — 99204 OFFICE O/P NEW MOD 45 MIN: CPT | Performed by: FAMILY MEDICINE

## 2022-12-01 RX ORDER — LIDOCAINE HYDROCHLORIDE 10 MG/ML
3 INJECTION, SOLUTION INFILTRATION; PERINEURAL ONCE
Status: COMPLETED | OUTPATIENT
Start: 2022-12-01 | End: 2022-12-01

## 2022-12-01 RX ORDER — TRIAMCINOLONE ACETONIDE 40 MG/ML
40 INJECTION, SUSPENSION INTRA-ARTICULAR; INTRAMUSCULAR ONCE
Status: COMPLETED | OUTPATIENT
Start: 2022-12-01 | End: 2022-12-01

## 2022-12-01 RX ADMIN — TRIAMCINOLONE ACETONIDE 40 MG: 40 INJECTION, SUSPENSION INTRA-ARTICULAR; INTRAMUSCULAR at 16:20

## 2022-12-01 RX ADMIN — LIDOCAINE HYDROCHLORIDE 3 ML: 10 INJECTION, SOLUTION INFILTRATION; PERINEURAL at 16:20

## 2022-12-06 NOTE — PROGRESS NOTES
[facility]  PRIMARY CARE SPORTS MEDICINE  DATE OF VISIT : 2022    Patient : Jazz Frank  Age : 43 y.o.  : 1980  MRN : [id number]   ______________________________________________________________________    Chief Complaint :   Chief Complaint   Patient presents with    Knee Pain     (L) knee pain. Pain has been ongoing for several months. Patient has had knee ASP in ER and XR. HPI : Jazz Frank is 43 y.o. female who presented to the clinic today for evaluation of left knee pain. Onset of the symptoms was several months ago, with no known mechanism of injury. Current symptoms include giving out, pain and swelling. Patient denies mechanical symptoms. Pain is aggravated by any weight bearing activity. Evaluation to date: XRs of the left knee which demonstrated no acute fractures or dislocations. Treatment to date: avoidance of offending activity and OTC analgesics which are not very effective. Past Medical History :  Past Medical History:   Diagnosis Date    Anxiety and depression     Breast disorder     Cancer ([address]     Cervical    Cervical cancer ([address]     pt doesn't remember when diagnosed, pt states she has been ca free for more than 5 years    Depression     Eclampsia 2011    Herpes simplex virus (HSV) infection     Hypothyroid     Mental disorder     Seizures ([address]     Thyroid disease      Past Surgical History:   Procedure Laterality Date    BREAST LUMPECTOMY Bilateral     CERVIX SURGERY      DILATION AND CURETTAGE OF UTERUS      INNER EAR SURGERY Left     LEG SURGERY Right        Allergies :   No Known Allergies    Medication List :    Current Outpatient Medications   Medication Sig Dispense Refill    naproxen (NAPROSYN) 500 MG tablet Take 1 tablet by mouth 2 times daily as needed for Pain (take with food and full glass of water.) 14 tablet 0     No current facility-administered medications for this visit. ______________________________________________________________________    Physical Exam :    Vitals: Ht 5' 8\" (1.727 m)   Wt 196 lb (88.9 kg)   LMP  (LMP Unknown)   BMI 29.80 kg/m²   General Appearance: Nontoxic, awake, alert, and in no acute distress. Chest wall/Lung: Respirations regular and unlabored. No cyanosis. Heart: RRR, distal pulses intact. Neurologic: Alert&Oriented x3. Sensation grossly intact. No focal motor deficits detected. Musculokeletal: LEFT Knee:  ROM: flexion to 130, extension to 0. No effusion. No obvious bony abnormality or ecchymosis. TTP: ( + ) MJL, ( + ) LJL, ( - ) patellar tendon, ( - ) quadriceps tendon, ( + ) patellar facets  Special Tests: ( - ) Patellar apprehension, ( - ) patellar grind  Stable and without pain to varus and valgus stress at 0 and 30 degrees of knee flexion. ACL: ( - ) Lachman's, ( - ) anterior drawer  PCL: ( - ) posterior drawer, ( - ) sag sign  Meniscus: ( + ) Wade's  ______________________________________________________________________    Assessment & Plan :    1. Chronic pain of left knee  2. Primary osteoarthritis of left knee  Patient presents to the office today for evaluation of left knee pain. History, referring provider note, physical exam and imaging (as interpreted by me) are consistent with left knee osteoarthritis. Treatment options discussed with patient in the office today including activity modification, oral anti-inflammatories, physical therapy, injection options, advanced imaging the form of a MRI and referral to an orthopedic surgeon for discussion of surgical opinion. Patient wishes to proceed with conservative treatment in the form of an intra-articular corticosteroid injection which was performed in the office today, please see procedure note below for further details.   Patient will follow up in 6 weeks for reevaluation of symptoms and consider escalation therapy should symptoms persist.  Patient is agreeable with above plan all questions and concerns were addressed in the office today. - lidocaine 1 % injection 3 mL  - triamcinolone acetonide (KENALOG-40) injection 40 mg    PROCEDURE NOTE: LEFT knee intra-articular corticosteroid injection  The procedure and its risks, benefits and alternatives were discussed with the patient, and informed consent was obtained. The area was prepped and cleaned with Alcohol. Ethyl Chloride was used for local anesthesia. A 25G 1.5\" needle was inserted into the joint and 3.0 mL of 1% Lidocaine without Epinephrine mixed with 1mL of Kenalog 40mg/1mL was injected into the joint without difficulty. A band aid was placed over the injection site. There was no blood loss. The patient tolerated the procedure well. Discussed signs and symptoms of infection and advised to call right away if this happens. Patient verbalizes understanding and agrees with above assessment, plan, procedure and counseling. Return to Office: Return in about 6 weeks (around 1/12/2023) for injection follow up.     Melissa Zavaleta MD

## 2022-12-09 ENCOUNTER — OFFICE VISIT (OUTPATIENT)
Dept: PRIMARY CARE CLINIC | Age: 42
End: 2022-12-09

## 2022-12-09 VITALS
HEART RATE: 78 BPM | TEMPERATURE: 97.1 F | DIASTOLIC BLOOD PRESSURE: 66 MMHG | BODY MASS INDEX: 29.55 KG/M2 | OXYGEN SATURATION: 98 % | HEIGHT: 68 IN | SYSTOLIC BLOOD PRESSURE: 102 MMHG | WEIGHT: 195 LBS | RESPIRATION RATE: 18 BRPM

## 2022-12-09 DIAGNOSIS — R61 NIGHT SWEATS: ICD-10-CM

## 2022-12-09 DIAGNOSIS — N95.1 PERIMENOPAUSAL VASOMOTOR SYMPTOMS: Primary | ICD-10-CM

## 2022-12-09 NOTE — PROGRESS NOTES
22  Raquel Mcdermott : 1980 Sex: female  Age: 43 y.o. Assessment and Plan:  Criss Calvillo was seen today for follow-up. Diagnoses and all orders for this visit:    Perimenopausal vasomotor symptoms  -     Trish Aguilar MD, OB/GYN, [address] [address], APRN-CNP, Endocrinology, [place]    Night sweats  -     [facility], APRN-CNP, Endocrinology, [place]    Referral placed for GYN again so that they can call the patient to reschedule  In the meantime, we will also arrange for evaluation through endocrinology as I suspect it will take a couple months for an appointment  The patient is started on medications through her GYN and improving, the evaluation with Endo can be canceled    Return if symptoms worsen or fail to improve. Chief Complaint   Patient presents with    Follow-up       HPI  Pt here for routine f/u night sweats    Left knee pain  Patient recently saw Dr. Rosario Mead and had injection  Has noted some improvement with this  F/u with sports med next month   Sounds like surgery will be considered if her symptoms do not resolve    Pt is still having night sweats   This is very frustrating to her  Thus far, our work-up is not found any cause  Patient did see GYN, and it sounds like they were doing a work-up for vasomotor symptoms  Patient was to have follow-up with apparently but this was not scheduled  Per her recollection, there was medication they were considering  No recent travel  TB testing was reassuring per patient   Patient continues to report that the symptoms are mostly at night, though she does have occasional daytime symptoms as well  She is really sick of this as it is interfering with her sleep and spiraling into other issues as a result    Problem list reviewed and updated in full with patient today as necessary. A comprehensive ROS was negative, except as documented above.        Current Outpatient Medications:     naproxen (NAPROSYN) 500 MG tablet, Take 1 tablet by mouth 2 times daily as needed for Pain (take with food and full glass of water.), Disp: 14 tablet, Rfl: 0  No Known Allergies    Pt's past medical and surgical history were reviewed and updated as necessary today   Pt's family and social history were reviewed and updated as necessary today      Vitals:    12/09/22 0943   BP: 102/66   Pulse: 78   Resp: 18   Temp: 97.1 °F (36.2 °C)   TempSrc: Temporal   SpO2: 98%   Weight: 195 lb (88.5 kg)   Height: 5' 8\" (1.727 m)       Physical Exam  Constitutional:       Appearance: Normal appearance. HENT:      Head: Normocephalic and atraumatic. Eyes:      Conjunctiva/sclera: Conjunctivae normal.   Cardiovascular:      Rate and Rhythm: Normal rate and regular rhythm. Heart sounds: Normal heart sounds. Pulmonary:      Effort: Pulmonary effort is normal.      Breath sounds: Normal breath sounds. Abdominal:      Palpations: Abdomen is soft. Tenderness: There is no abdominal tenderness. Musculoskeletal:         General: Normal range of motion. Skin:     General: Skin is warm and dry. Neurological:      General: No focal deficit present. Mental Status: She is alert and oriented to person, place, and time. Psychiatric:         Mood and Affect: Mood normal.         Behavior: Behavior normal.        Counseled patient as appropriate and relevant regarding above diagnosis, including possible risks and complications, especially if left uncontrolled. Counseled patient as appropriate and relevant regarding any  possible side effects, risks, and alternatives to treatment; patient and/or guardian verbalizes understanding, and is in agreement with the plan as detailed above. Reviewed age and gender appropriate health screening exams and vaccinations.   Advised patient regarding importance of keeping up with recommended health maintenance and to schedule as soon as possible if overdue, as this is important in assessing for undiagnosed pathology, especially cancer, as well as protecting against potentially harmful/life threatening disease. If discussed, any educational materials and/or home exercises printed for patient's review and were included in patient instructions on his/her After Visit Summary and given to patient at the end of visit. Advised patient to call with any new medication issues, and and other concerns/complaints prior to scheduled follow up. All questions answered to the patient's satisfaction.         Seen By:  Patricia Adam MD

## 2023-01-04 ENCOUNTER — TELEPHONE (OUTPATIENT)
Dept: CARE COORDINATION | Age: 43
End: 2023-01-04

## 2023-01-04 ENCOUNTER — CARE COORDINATION (OUTPATIENT)
Dept: CARE COORDINATION | Age: 43
End: 2023-01-04

## 2023-01-04 NOTE — CARE COORDINATION
[facility] spoke with Shanon Crisostomo today. She reports the number and resource given for transportation is working well for her. She reports no current [facility] needs or concerns.     [facility] will sign off today

## 2023-01-04 NOTE — TELEPHONE ENCOUNTER
[facility] called and spoke with Dann Munguia today. Reviewed previous contact and Vm 's that were left. Dann Munguia states she is still interested in completing AD's but will need the packet mailed out to her again. [facility] verified the mailing address and will request that the AD information be sent out. [facility] will follow up with Dann Munguia to assist with completion.

## 2023-01-17 ENCOUNTER — OFFICE VISIT (OUTPATIENT)
Dept: ORTHOPEDIC SURGERY | Age: 43
End: 2023-01-17
Payer: COMMERCIAL

## 2023-01-17 ENCOUNTER — TELEPHONE (OUTPATIENT)
Dept: CARE COORDINATION | Age: 43
End: 2023-01-17

## 2023-01-17 VITALS — HEIGHT: 68 IN | WEIGHT: 195 LBS | BODY MASS INDEX: 29.55 KG/M2

## 2023-01-17 DIAGNOSIS — S83.242D TEAR OF MEDIAL MENISCUS OF LEFT KNEE, CURRENT, UNSPECIFIED TEAR TYPE, SUBSEQUENT ENCOUNTER: Primary | ICD-10-CM

## 2023-01-17 PROCEDURE — 99213 OFFICE O/P EST LOW 20 MIN: CPT | Performed by: FAMILY MEDICINE

## 2023-01-17 NOTE — TELEPHONE ENCOUNTER
[facility] spoke with Greenwood Candelaria today. She states she did not receive the AD packet that was re mailed to her. [facility] will follow up again next week. Encouraged Greenwoodyael Gaona to open and review the information when she receives it so she is aware of what help she may need from Amy Rainey/Regis James when next outreach is made.   Karina Gaona verbalized understanding and agreement    [facility]PS to follow up with another outreach call

## 2023-01-17 NOTE — PROGRESS NOTES
[facility]  PRIMARY CARE SPORTS MEDICINE  DATE OF VISIT : 2023    Patient : María Ram  Age : 43 y.o.  : 1980  MRN : [id number]   ______________________________________________________________________    Chief Complaint :   Chief Complaint   Patient presents with    Knee Pain     (L) knee pain. Patient had an injection on 2022 but only received a few days of relief. Patient reports mechanical symptoms and sharp pain on medial joint line of the knee. HPI : María Ram is 43 y.o. female who presented to the clinic today for follow up of left knee pain s/p CSI on 2022. Patient reports inadequate response to corticosteroid injection. Patient now endorsing mechanical symptoms of clicking and instability. Patient denies new injury.     Past Medical History :  Past Medical History:   Diagnosis Date    Anxiety and depression     Breast disorder     Cancer ([facility]     Cervical    Cervical cancer ([facility] [address]     pt doesn't remember when diagnosed, pt states she has been ca free for more than 5 years    Depression     Eclampsia 2011    Herpes simplex virus (HSV) infection     Hypothyroid     Mental disorder     Seizures ([facility] [address]     Thyroid disease      Past Surgical History:   Procedure Laterality Date    BREAST LUMPECTOMY Bilateral     CERVIX SURGERY      DILATION AND CURETTAGE OF UTERUS      INNER EAR SURGERY Left     LEG SURGERY Right        Allergies :   No Known Allergies    Medication List :    Current Outpatient Medications   Medication Sig Dispense Refill    naproxen (NAPROSYN) 500 MG tablet Take 1 tablet by mouth 2 times daily as needed for Pain (take with food and full glass of water.) 14 tablet 0     No current facility-administered medications for this visit.      ______________________________________________________________________    Physical Exam :    Vitals: Ht 5' 8\" (1.727 m)   Wt 195 lb (88.5 kg)   BMI 29.65 kg/m²   General Appearance: Nontoxic, awake, alert, and in no acute distress. Chest wall/Lung: Respirations regular and unlabored. No cyanosis. Heart: RRR, distal pulses intact. Neurologic: Alert&Oriented x3. Sensation grossly intact. No focal motor deficits detected. Musculokeletal: LEFT Knee:  ROM: flexion to 130, extension to 0.  1+ effusion. No obvious bony abnormality or ecchymosis. TTP: ( + ) MJL, ( - ) LJL, ( - ) patellar tendon, ( - ) quadriceps tendon, ( - ) patellar facets  Special Tests: ( - ) Patellar apprehension, ( - ) patellar grind  Stable and without pain to varus and valgus stress at 0 and 30 degrees of knee flexion. ACL: ( - ) Lachman's, ( - ) anterior drawer  PCL: ( - ) posterior drawer, ( - ) sag sign  Meniscus: ( + ) Wade's  ______________________________________________________________________    Assessment & Plan :    1. Tear of medial meniscus of left knee, current, unspecified tear type, subsequent encounter  Patient presents to the office today for follow-up of left knee pain s/p CSI on 12/1/2022. Patient reports inadequate response to corticosteroid injections. Treatment options reviewed with patient in the office today and due to the severity of symptoms, chronicity of symptoms and physical exam findings, a MRI of the left knee will be obtained to further delineate pathology and aid in medical decision making/surgical planning. Patient is agreeable with the above plan and all questions and concerns were addressed in the office today. - MRI KNEE LEFT WO CONTRAST; Future    Return to Office: Return for review of MRI after completion.     Kimberly Gamez MD

## 2023-01-18 ENCOUNTER — OFFICE VISIT (OUTPATIENT)
Dept: OBGYN | Age: 43
End: 2023-01-18
Payer: COMMERCIAL

## 2023-01-18 VITALS
WEIGHT: 195 LBS | SYSTOLIC BLOOD PRESSURE: 113 MMHG | HEIGHT: 68 IN | BODY MASS INDEX: 29.55 KG/M2 | HEART RATE: 89 BPM | DIASTOLIC BLOOD PRESSURE: 68 MMHG

## 2023-01-18 DIAGNOSIS — R35.1 NOCTURIA: ICD-10-CM

## 2023-01-18 DIAGNOSIS — R30.0 DYSURIA: Primary | ICD-10-CM

## 2023-01-18 DIAGNOSIS — N30.00 ACUTE CYSTITIS WITHOUT HEMATURIA: ICD-10-CM

## 2023-01-18 DIAGNOSIS — R30.0 DYSURIA: ICD-10-CM

## 2023-01-18 LAB
BILIRUBIN, POC: NORMAL
BLOOD URINE, POC: NORMAL
CLARITY, POC: NORMAL
COLOR, POC: NORMAL
CONTROL: NORMAL
GLUCOSE URINE, POC: NORMAL
KETONES, POC: NORMAL
LEUKOCYTE EST, POC: NORMAL
NITRITE, POC: NORMAL
PH, POC: 5
PREGNANCY TEST URINE, POC: NEGATIVE
PROTEIN, POC: NORMAL
SPECIFIC GRAVITY, POC: 1.03
UROBILINOGEN, POC: 0.2

## 2023-01-18 PROCEDURE — 81002 URINALYSIS NONAUTO W/O SCOPE: CPT | Performed by: OBSTETRICS & GYNECOLOGY

## 2023-01-18 PROCEDURE — 81025 URINE PREGNANCY TEST: CPT | Performed by: OBSTETRICS & GYNECOLOGY

## 2023-01-18 PROCEDURE — 99213 OFFICE O/P EST LOW 20 MIN: CPT | Performed by: OBSTETRICS & GYNECOLOGY

## 2023-01-18 RX ORDER — NITROFURANTOIN 25; 75 MG/1; MG/1
100 CAPSULE ORAL 2 TIMES DAILY
Qty: 14 CAPSULE | Refills: 0 | Status: SHIPPED | OUTPATIENT
Start: 2023-01-18 | End: 2023-01-25

## 2023-01-18 NOTE — PROGRESS NOTES
Patient alert and pleasant witn concerns about possible UTI   Urine for culture obtained, labeled and sent to lab  Discharge instructions have been discussed with the patient. Patient advised to call our office with any questions or concerns.   Voiced understanding.

## 2023-01-18 NOTE — PROGRESS NOTES
Here today for a possible UTI. Bladder discomfort especially at night,  Hot flashes as well. Menopausal ?? No menses for 2 years. Urine dip here in the Clinic shows blood and leukocytes. NKDA  Nocturia 4-5 times. Lots of water a day. IMP: UTI    PLAN:  Rx macrobid bid for 7 days, RTC 3 weeks for recheck and discuss hot flashes.

## 2023-01-20 LAB — URINE CULTURE, ROUTINE: NORMAL

## 2023-01-25 ENCOUNTER — APPOINTMENT (OUTPATIENT)
Dept: GENERAL RADIOLOGY | Age: 43
DRG: 880 | End: 2023-01-25
Payer: MEDICARE

## 2023-01-25 ENCOUNTER — APPOINTMENT (OUTPATIENT)
Dept: CT IMAGING | Age: 43
DRG: 880 | End: 2023-01-25
Payer: MEDICARE

## 2023-01-25 ENCOUNTER — TELEPHONE (OUTPATIENT)
Dept: CARE COORDINATION | Age: 43
End: 2023-01-25

## 2023-01-25 ENCOUNTER — HOSPITAL ENCOUNTER (INPATIENT)
Age: 43
LOS: 5 days | Discharge: HOME | DRG: 880 | End: 2023-01-30
Attending: STUDENT IN AN ORGANIZED HEALTH CARE EDUCATION/TRAINING PROGRAM | Admitting: INTERNAL MEDICINE
Payer: MEDICARE

## 2023-01-25 DIAGNOSIS — R10.9 ABDOMINAL PAIN, UNSPECIFIED ABDOMINAL LOCATION: Primary | ICD-10-CM

## 2023-01-25 PROBLEM — R41.82 AMS (ALTERED MENTAL STATUS): Status: ACTIVE | Noted: 2023-01-25

## 2023-01-25 LAB
ALBUMIN SERPL-MCNC: 4.9 G/DL (ref 3.5–5.2)
ALP BLD-CCNC: 88 U/L (ref 35–104)
ALT SERPL-CCNC: 14 U/L (ref 0–32)
AMMONIA: 32 UMOL/L (ref 11–51)
ANION GAP SERPL CALCULATED.3IONS-SCNC: 18 MMOL/L (ref 7–16)
AST SERPL-CCNC: 18 U/L (ref 0–31)
BASOPHILS ABSOLUTE: 0.01 E9/L (ref 0–0.2)
BASOPHILS RELATIVE PERCENT: 0.2 % (ref 0–2)
BILIRUB SERPL-MCNC: 0.6 MG/DL (ref 0–1.2)
BUN BLDV-MCNC: 11 MG/DL (ref 6–20)
CALCIUM SERPL-MCNC: 10 MG/DL (ref 8.6–10.2)
CHLORIDE BLD-SCNC: 101 MMOL/L (ref 98–107)
CO2: 19 MMOL/L (ref 22–29)
CREAT SERPL-MCNC: 0.8 MG/DL (ref 0.5–1)
EKG ATRIAL RATE: 107 BPM
EKG P AXIS: 61 DEGREES
EKG P-R INTERVAL: 146 MS
EKG Q-T INTERVAL: 358 MS
EKG QRS DURATION: 82 MS
EKG QTC CALCULATION (BAZETT): 475 MS
EKG R AXIS: 30 DEGREES
EKG T AXIS: 24 DEGREES
EKG VENTRICULAR RATE: 106 BPM
EOSINOPHILS ABSOLUTE: 0 E9/L (ref 0.05–0.5)
EOSINOPHILS RELATIVE PERCENT: 0 % (ref 0–6)
GFR SERPL CREATININE-BSD FRML MDRD: >60 ML/MIN/1.73
GLUCOSE BLD-MCNC: 105 MG/DL (ref 74–99)
HCT VFR BLD CALC: 36.6 % (ref 34–48)
HEMOGLOBIN: 12.5 G/DL (ref 11.5–15.5)
IMMATURE GRANULOCYTES #: 0.02 E9/L
IMMATURE GRANULOCYTES %: 0.3 % (ref 0–5)
INFLUENZA A BY PCR: NOT DETECTED
INFLUENZA B BY PCR: NOT DETECTED
LACTIC ACID: 2 MMOL/L (ref 0.5–2.2)
LACTIC ACID: 3.9 MMOL/L (ref 0.5–2.2)
LACTIC ACID: 4.9 MMOL/L (ref 0.5–2.2)
LIPASE: 15 U/L (ref 13–60)
LYMPHOCYTES ABSOLUTE: 0.79 E9/L (ref 1.5–4)
LYMPHOCYTES RELATIVE PERCENT: 12 % (ref 20–42)
MAGNESIUM: 2 MG/DL (ref 1.6–2.6)
MCH RBC QN AUTO: 28.5 PG (ref 26–35)
MCHC RBC AUTO-ENTMCNC: 34.2 % (ref 32–34.5)
MCV RBC AUTO: 83.4 FL (ref 80–99.9)
METER GLUCOSE: 72 MG/DL (ref 74–99)
METER GLUCOSE: 84 MG/DL (ref 74–99)
MONOCYTES ABSOLUTE: 0.25 E9/L (ref 0.1–0.95)
MONOCYTES RELATIVE PERCENT: 3.8 % (ref 2–12)
NEUTROPHILS ABSOLUTE: 5.5 E9/L (ref 1.8–7.3)
NEUTROPHILS RELATIVE PERCENT: 83.7 % (ref 43–80)
PDW BLD-RTO: 14.3 FL (ref 11.5–15)
PLATELET # BLD: 314 E9/L (ref 130–450)
PMV BLD AUTO: 9.3 FL (ref 7–12)
POTASSIUM SERPL-SCNC: 3.9 MMOL/L (ref 3.5–5)
RBC # BLD: 4.39 E12/L (ref 3.5–5.5)
SARS-COV-2, NAAT: NOT DETECTED
SODIUM BLD-SCNC: 138 MMOL/L (ref 132–146)
TOTAL PROTEIN: 8.8 G/DL (ref 6.4–8.3)
TROPONIN, HIGH SENSITIVITY: <6 NG/L (ref 0–9)
TSH SERPL DL<=0.05 MIU/L-ACNC: 0.27 UIU/ML (ref 0.27–4.2)
VITAMIN B-12: <150 PG/ML (ref 211–946)
WBC # BLD: 6.6 E9/L (ref 4.5–11.5)

## 2023-01-25 PROCEDURE — 84443 ASSAY THYROID STIM HORMONE: CPT

## 2023-01-25 PROCEDURE — 6360000002 HC RX W HCPCS: Performed by: INTERNAL MEDICINE

## 2023-01-25 PROCEDURE — 83605 ASSAY OF LACTIC ACID: CPT

## 2023-01-25 PROCEDURE — 71275 CT ANGIOGRAPHY CHEST: CPT

## 2023-01-25 PROCEDURE — 6360000002 HC RX W HCPCS

## 2023-01-25 PROCEDURE — 96361 HYDRATE IV INFUSION ADD-ON: CPT

## 2023-01-25 PROCEDURE — 70450 CT HEAD/BRAIN W/O DYE: CPT

## 2023-01-25 PROCEDURE — 71045 X-RAY EXAM CHEST 1 VIEW: CPT

## 2023-01-25 PROCEDURE — 2500000003 HC RX 250 WO HCPCS

## 2023-01-25 PROCEDURE — 99285 EMERGENCY DEPT VISIT HI MDM: CPT

## 2023-01-25 PROCEDURE — 83735 ASSAY OF MAGNESIUM: CPT

## 2023-01-25 PROCEDURE — 87635 SARS-COV-2 COVID-19 AMP PRB: CPT

## 2023-01-25 PROCEDURE — 84484 ASSAY OF TROPONIN QUANT: CPT

## 2023-01-25 PROCEDURE — 83690 ASSAY OF LIPASE: CPT

## 2023-01-25 PROCEDURE — 82607 VITAMIN B-12: CPT

## 2023-01-25 PROCEDURE — 82962 GLUCOSE BLOOD TEST: CPT

## 2023-01-25 PROCEDURE — 74177 CT ABD & PELVIS W/CONTRAST: CPT

## 2023-01-25 PROCEDURE — 1200000000 HC SEMI PRIVATE

## 2023-01-25 PROCEDURE — 96374 THER/PROPH/DIAG INJ IV PUSH: CPT

## 2023-01-25 PROCEDURE — 2580000003 HC RX 258

## 2023-01-25 PROCEDURE — 96375 TX/PRO/DX INJ NEW DRUG ADDON: CPT

## 2023-01-25 PROCEDURE — 87502 INFLUENZA DNA AMP PROBE: CPT

## 2023-01-25 PROCEDURE — 2580000003 HC RX 258: Performed by: RADIOLOGY

## 2023-01-25 PROCEDURE — 6360000004 HC RX CONTRAST MEDICATION: Performed by: RADIOLOGY

## 2023-01-25 PROCEDURE — 82140 ASSAY OF AMMONIA: CPT

## 2023-01-25 PROCEDURE — 93005 ELECTROCARDIOGRAM TRACING: CPT

## 2023-01-25 PROCEDURE — 2580000003 HC RX 258: Performed by: STUDENT IN AN ORGANIZED HEALTH CARE EDUCATION/TRAINING PROGRAM

## 2023-01-25 PROCEDURE — 85025 COMPLETE CBC W/AUTO DIFF WBC: CPT

## 2023-01-25 PROCEDURE — 36415 COLL VENOUS BLD VENIPUNCTURE: CPT

## 2023-01-25 PROCEDURE — 2580000003 HC RX 258: Performed by: INTERNAL MEDICINE

## 2023-01-25 PROCEDURE — 80053 COMPREHEN METABOLIC PANEL: CPT

## 2023-01-25 PROCEDURE — 93010 ELECTROCARDIOGRAM REPORT: CPT | Performed by: INTERNAL MEDICINE

## 2023-01-25 RX ORDER — ONDANSETRON 2 MG/ML
4 INJECTION INTRAMUSCULAR; INTRAVENOUS EVERY 6 HOURS PRN
Status: DISCONTINUED | OUTPATIENT
Start: 2023-01-25 | End: 2023-01-30 | Stop reason: HOSPADM

## 2023-01-25 RX ORDER — ACETAMINOPHEN 325 MG/1
650 TABLET ORAL EVERY 6 HOURS PRN
Status: DISCONTINUED | OUTPATIENT
Start: 2023-01-25 | End: 2023-01-30 | Stop reason: HOSPADM

## 2023-01-25 RX ORDER — ONDANSETRON 2 MG/ML
4 INJECTION INTRAMUSCULAR; INTRAVENOUS EVERY 6 HOURS PRN
Status: DISCONTINUED | OUTPATIENT
Start: 2023-01-25 | End: 2023-01-25

## 2023-01-25 RX ORDER — SODIUM CHLORIDE 0.9 % (FLUSH) 0.9 %
10 SYRINGE (ML) INJECTION PRN
Status: DISCONTINUED | OUTPATIENT
Start: 2023-01-25 | End: 2023-01-30 | Stop reason: HOSPADM

## 2023-01-25 RX ORDER — 0.9 % SODIUM CHLORIDE 0.9 %
1000 INTRAVENOUS SOLUTION INTRAVENOUS ONCE
Status: COMPLETED | OUTPATIENT
Start: 2023-01-25 | End: 2023-01-25

## 2023-01-25 RX ORDER — 0.9 % SODIUM CHLORIDE 0.9 %
500 INTRAVENOUS SOLUTION INTRAVENOUS ONCE
Status: COMPLETED | OUTPATIENT
Start: 2023-01-25 | End: 2023-01-26

## 2023-01-25 RX ORDER — SODIUM CHLORIDE 9 MG/ML
INJECTION, SOLUTION INTRAVENOUS PRN
Status: DISCONTINUED | OUTPATIENT
Start: 2023-01-25 | End: 2023-01-30 | Stop reason: HOSPADM

## 2023-01-25 RX ORDER — DEXTROSE MONOHYDRATE 25 G/50ML
INJECTION, SOLUTION INTRAVENOUS
Status: COMPLETED
Start: 2023-01-25 | End: 2023-01-25

## 2023-01-25 RX ORDER — ENOXAPARIN SODIUM 100 MG/ML
40 INJECTION SUBCUTANEOUS DAILY
Status: DISCONTINUED | OUTPATIENT
Start: 2023-01-25 | End: 2023-01-30 | Stop reason: HOSPADM

## 2023-01-25 RX ORDER — ONDANSETRON 4 MG/1
4 TABLET, ORALLY DISINTEGRATING ORAL EVERY 8 HOURS PRN
Status: DISCONTINUED | OUTPATIENT
Start: 2023-01-25 | End: 2023-01-30 | Stop reason: HOSPADM

## 2023-01-25 RX ORDER — SODIUM CHLORIDE 0.9 % (FLUSH) 0.9 %
10 SYRINGE (ML) INJECTION EVERY 12 HOURS SCHEDULED
Status: DISCONTINUED | OUTPATIENT
Start: 2023-01-25 | End: 2023-01-30 | Stop reason: HOSPADM

## 2023-01-25 RX ORDER — ACETAMINOPHEN 650 MG/1
650 SUPPOSITORY RECTAL EVERY 6 HOURS PRN
Status: DISCONTINUED | OUTPATIENT
Start: 2023-01-25 | End: 2023-01-30 | Stop reason: HOSPADM

## 2023-01-25 RX ORDER — LORAZEPAM 2 MG/ML
INJECTION INTRAMUSCULAR
Status: COMPLETED
Start: 2023-01-25 | End: 2023-01-25

## 2023-01-25 RX ORDER — ONDANSETRON 2 MG/ML
4 INJECTION INTRAMUSCULAR; INTRAVENOUS ONCE
Status: COMPLETED | OUTPATIENT
Start: 2023-01-25 | End: 2023-01-25

## 2023-01-25 RX ORDER — SODIUM CHLORIDE 9 MG/ML
INJECTION, SOLUTION INTRAVENOUS CONTINUOUS
Status: ACTIVE | OUTPATIENT
Start: 2023-01-25 | End: 2023-01-26

## 2023-01-25 RX ADMIN — SODIUM CHLORIDE 500 ML: 9 INJECTION, SOLUTION INTRAVENOUS at 23:18

## 2023-01-25 RX ADMIN — SODIUM CHLORIDE: 9 INJECTION, SOLUTION INTRAVENOUS at 22:17

## 2023-01-25 RX ADMIN — Medication 10 ML: at 15:29

## 2023-01-25 RX ADMIN — DEXTROSE MONOHYDRATE: 25 INJECTION, SOLUTION INTRAVENOUS at 13:00

## 2023-01-25 RX ADMIN — SODIUM CHLORIDE, PRESERVATIVE FREE 10 ML: 5 INJECTION INTRAVENOUS at 22:17

## 2023-01-25 RX ADMIN — SODIUM CHLORIDE 1000 ML: 9 INJECTION, SOLUTION INTRAVENOUS at 12:26

## 2023-01-25 RX ADMIN — LORAZEPAM: 2 INJECTION INTRAMUSCULAR; INTRAVENOUS at 12:30

## 2023-01-25 RX ADMIN — IOPAMIDOL 75 ML: 755 INJECTION, SOLUTION INTRAVENOUS at 15:29

## 2023-01-25 RX ADMIN — SODIUM CHLORIDE, PRESERVATIVE FREE 20 MG: 5 INJECTION INTRAVENOUS at 13:46

## 2023-01-25 RX ADMIN — ONDANSETRON 4 MG: 2 INJECTION INTRAMUSCULAR; INTRAVENOUS at 22:16

## 2023-01-25 RX ADMIN — ONDANSETRON 4 MG: 2 INJECTION INTRAMUSCULAR; INTRAVENOUS at 13:50

## 2023-01-25 RX ADMIN — SODIUM CHLORIDE 1000 ML: 9 INJECTION, SOLUTION INTRAVENOUS at 14:00

## 2023-01-25 ASSESSMENT — PAIN DESCRIPTION - DESCRIPTORS: DESCRIPTORS: ACHING

## 2023-01-25 ASSESSMENT — PAIN SCALES - GENERAL
PAINLEVEL_OUTOF10: 0
PAINLEVEL_OUTOF10: 10

## 2023-01-25 ASSESSMENT — PAIN DESCRIPTION - PAIN TYPE: TYPE: ACUTE PAIN

## 2023-01-25 ASSESSMENT — PAIN - FUNCTIONAL ASSESSMENT: PAIN_FUNCTIONAL_ASSESSMENT: 0-10

## 2023-01-25 ASSESSMENT — PAIN DESCRIPTION - LOCATION: LOCATION: ABDOMEN

## 2023-01-25 ASSESSMENT — PAIN DESCRIPTION - FREQUENCY: FREQUENCY: CONTINUOUS

## 2023-01-25 NOTE — ED PROVIDER NOTES
Name: Kika Rodriguez    MRN: [id number]     Date / Time Roomed:  1/25/2023 12:05 PM  ED Bed Assignment:  [id number]    ------------------ History of Present Illness --------------------  1/25/23, Time: 12:22 PM EST   Chief Complaint   Patient presents with    Abdominal Pain     Abd pain with nausea ans vomiting since last night       HPI    Kika Rodriguez is a 43 y.o. female, with hx of Depression, Anxiety, Seizure, who presents to the ED today for Abdominal Pain, which began last night. The pt denies other ROS at this time. Patient presenting to the hospital for abdominal pain and vomiting since last night. Patient states that around 7 PM yesterday she noticed that she was going to have abdominal pain. Following this she developed vomiting and nausea. She denies any diarrhea. States that she has not been able to keep anything down and just vomits whenever she does try to take it. Patient denies any blood in her vomit. States that her abdomen is exquisitely tender and is very painful to palpation. PCP: Miracle Kwon MD.    -------------------- PMH --------------------  Past Medical History:  has a past medical history of Anxiety and depression, Breast disorder, Cancer ([facility] [address] Cervical cancer ([facility] [address] Depression, Eclampsia, Herpes simplex virus (HSV) infection, Hypothyroid, Mental disorder, Seizures ([facility] [address] and Thyroid disease. Past Surgical History:  has a past surgical history that includes Leg Surgery (Right); Inner ear surgery (Left); Cervix surgery; Breast lumpectomy (Bilateral); and Dilation and curettage of uterus. Social History:  reports that she has been smoking cigarettes. She has never used smokeless tobacco. She reports that she does not currently use alcohol. She reports that she does not use drugs. Family History: family history is not on file. Allergies: Patient has no known allergies. The patients past medical history has been reviewed.     -------------------- Current Meds --------------------  Meds:   Current Facility-Administered Medications:     promethazine (PHENERGAN) 6.25 mg in sodium chloride 0.9% 50 mL IVPB, 6.25 mg, IntraVENous, Q6H PRN, Melissa Corcoran DO, Last Rate: 100 mL/hr at 01/26/23 1010, 6.25 mg at 01/26/23 1010    potassium bicarb-citric acid (EFFER-K) effervescent tablet 40 mEq, 40 mEq, Oral, BID, Carol Felix MD    sodium chloride flush 0.9 % injection 10 mL, 10 mL, IntraVENous, PRN, Antonio Khan DO, 10 mL at 98/15/78 1529    sodium chloride flush 0.9 % injection 10 mL, 10 mL, IntraVENous, 2 times per day, James Elizondo MD, 10 mL at 01/26/23 0818    sodium chloride flush 0.9 % injection 10 mL, 10 mL, IntraVENous, PRN, James Elizondo MD    0.9 % sodium chloride infusion, , IntraVENous, PRN, Mau Lujan MD    enoxaparin (LOVENOX) injection 40 mg, 40 mg, SubCUTAneous, Daily, Mua Lujan MD, 40 mg at 01/26/23 0818    ondansetron (ZOFRAN-ODT) disintegrating tablet 4 mg, 4 mg, Oral, Q8H PRN **OR** ondansetron (ZOFRAN) injection 4 mg, 4 mg, IntraVENous, Q6H PRN, James Elizondo MD, 4 mg at 01/26/23 0730    magnesium hydroxide (MILK OF MAGNESIA) 400 MG/5ML suspension 30 mL, 30 mL, Oral, Daily PRN, James Elizondo MD    acetaminophen (TYLENOL) tablet 650 mg, 650 mg, Oral, Q6H PRN, 650 mg at 01/26/23 0730 **OR** acetaminophen (TYLENOL) suppository 650 mg, 650 mg, Rectal, Q6H PRN, James Elizondo MD     The patients home medications have been reviewed. -------------------- PE --------------------  Physical Exam  Constitutional:       Appearance: Normal appearance. Eyes:      Pupils: Pupils are equal, round, and reactive to light. Cardiovascular:      Rate and Rhythm: Normal rate and regular rhythm. Pulses: Normal pulses. Heart sounds: Normal heart sounds. No murmur heard. Pulmonary:      Effort: Pulmonary effort is normal.      Breath sounds: Normal breath sounds. No wheezing or rhonchi.    Abdominal:      General: Abdomen is flat. There is no distension. Palpations: Abdomen is soft. Tenderness: There is generalized abdominal tenderness. Musculoskeletal:         General: Normal range of motion. Cervical back: Normal range of motion. Skin:     General: Skin is warm and dry. Capillary Refill: Capillary refill takes less than 2 seconds. Psychiatric:         Attention and Perception: She is inattentive. Behavior: Behavior is uncooperative.      -------------------- MDM --------------------  Patient presents to the hospital for abdominal pain that has been going on for 10 days. Patient was brought in by EMS due to the severity of her abdominal pain. Patient's is very quiet when asked questions, she is slow to respond and does not always answer questions fully. The information I was able to gather from her however she states that the pain has been going on since about 7 PM last night, states she has been unable to keep anything down and she has severe abdominal pain. On palpation it is noted that she does react quite painfully to any stimulation of her abdomen. Patient is noted to be hunched and curled in a ball. Given that patient was in severe abdominal pain she was started on IV fluids given Zofran and sent for an abdominal CT of her abdomen. While waiting for abdominal CT of her abdomen patient began to shake violently and there was concern for seizure however on evaluation patient was able to be distracted and was able to follow commands well having said \"seizure\" patient was not cooperative during these however she would not answer questions, when sternal rub she did respond and seemed to \"wake up\". Given that patient appeared to be having concerns of seizures a CT scan of her head was ordered. During these episode patient was able to respond to commands such as keeping her arm straight and not shaking it, responding to painful stimuli.,  She was given 2 mg of Ativan for these episodes.   CT of the head and abdomen both came back negative for any acute processes. BMP and CBC were ordered, lactic acid was noted to be elevated however did correct with fluid resuscitation of 1 L. Patient's glucose was noted to be 72, she was given 1 amp of D10. Troponin was also obtained which showed to be less than 6. Given that patient was having these seizure-like episodes along with her severe abdominal pain decision was made to admit patient to the hospital.  Internal medicine was consulted and they agreed to accept patient for further evaluation. /72   Pulse 95   Temp 97.7 °F (36.5 °C) (Oral)   Resp 18   Ht 5' 11\" (1.803 m)   Wt 193 lb (87.5 kg)   SpO2 100%   BMI 26.92 kg/m²     Exam remarkable for abdominal pain    Diagnoses considered, but not limited to, include gastroenteritis, seizure, stroke. EKG Interpretation  Interpreted by emergency department physician. 1/26/23  Time: 1323    Rate: 106  Axis: Normal  NV: 146  QRS: 82  Qtc: 475  Rhythm: Sinus  Clinical Impression: Sinus tachycardia with nonspecific ST changes  Comparison to old EKG: changes have occurred when compared to most recent      Labwork ordered and interpretation by myself. Details below. Imaging ordered and interpretations by myself and radiologist. Details below.             Medications given include:    Medications   sodium chloride flush 0.9 % injection 10 mL (10 mLs IntraVENous Given 1/25/23 1529)   sodium chloride flush 0.9 % injection 10 mL (10 mLs IntraVENous Given 1/26/23 0818)   sodium chloride flush 0.9 % injection 10 mL (has no administration in time range)   0.9 % sodium chloride infusion (has no administration in time range)   enoxaparin (LOVENOX) injection 40 mg (40 mg SubCUTAneous Given 1/26/23 0818)   ondansetron (ZOFRAN-ODT) disintegrating tablet 4 mg ( Oral See Alternative 1/26/23 0730)     Or   ondansetron (ZOFRAN) injection 4 mg (4 mg IntraVENous Given 1/26/23 0730)   magnesium hydroxide (MILK OF MAGNESIA) 400 MG/5ML suspension 30 mL (has no administration in time range)   acetaminophen (TYLENOL) tablet 650 mg (650 mg Oral Given 1/26/23 0730)     Or   acetaminophen (TYLENOL) suppository 650 mg ( Rectal See Alternative 1/26/23 0730)   0.9 % sodium chloride infusion ( IntraVENous Rate/Dose Change 1/26/23 0453)   promethazine (PHENERGAN) 6.25 mg in sodium chloride 0.9% 50 mL IVPB (6.25 mg IntraVENous New Bag 1/26/23 1010)   potassium bicarb-citric acid (EFFER-K) effervescent tablet 40 mEq (has no administration in time range)   0.9 % sodium chloride bolus (0 mLs IntraVENous Stopped 1/25/23 1345)   famotidine (PEPCID) 20 mg in sodium chloride (PF) 0.9 % 10 mL injection (20 mg IntraVENous Given 1/25/23 1346)   LORazepam (ATIVAN) 2 MG/ML injection (  Given 1/25/23 1230)   dextrose 50 % solution (  Given 1/25/23 1300)   ondansetron (ZOFRAN) injection 4 mg (4 mg IntraVENous Given 1/25/23 1350)   0.9 % sodium chloride bolus (0 mLs IntraVENous Stopped 1/25/23 1740)   iopamidol (ISOVUE-370) 76 % injection 75 mL (75 mLs IntraVENous Given 1/25/23 1529)   0.9 % sodium chloride bolus (0 mLs IntraVENous Stopped 1/26/23 0102)   -------------------- Consultations --------------------    Dr Linda Marcum was consulted for admission    -------------------- RESULTS --------------------    LABS:  Results for orders placed or performed during the hospital encounter of 01/25/23   COVID-19, Rapid    Specimen: Nasopharyngeal Swab   Result Value Ref Range    SARS-CoV-2, NAAT Not Detected Not Detected   RAPID INFLUENZA A/B ANTIGENS    Specimen: Nasopharyngeal   Result Value Ref Range    Influenza A by PCR Not Detected Not Detected    Influenza B by PCR Not Detected Not Detected   CBC with Auto Differential   Result Value Ref Range    WBC 6.6 4.5 - 11.5 E9/L    RBC 4.39 3.50 - 5.50 E12/L    Hemoglobin 12.5 11.5 - 15.5 g/dL    Hematocrit 36.6 34.0 - 48.0 %    MCV 83.4 80.0 - 99.9 fL    MCH 28.5 26.0 - 35.0 pg    MCHC 34.2 32.0 - 34.5 %    RDW 14.3 11.5 - 15.0 fL    Platelets 370 930 - 463 E9/L    MPV 9.3 7.0 - 12.0 fL    Neutrophils % 83.7 (H) 43.0 - 80.0 %    Immature Granulocytes % 0.3 0.0 - 5.0 %    Lymphocytes % 12.0 (L) 20.0 - 42.0 %    Monocytes % 3.8 2.0 - 12.0 %    Eosinophils % 0.0 0.0 - 6.0 %    Basophils % 0.2 0.0 - 2.0 %    Neutrophils Absolute 5.50 1.80 - 7.30 E9/L    Immature Granulocytes # 0.02 E9/L    Lymphocytes Absolute 0.79 (L) 1.50 - 4.00 E9/L    Monocytes Absolute 0.25 0.10 - 0.95 E9/L    Eosinophils Absolute 0.00 (L) 0.05 - 0.50 E9/L    Basophils Absolute 0.01 0.00 - 0.20 E9/L   Comprehensive Metabolic Panel   Result Value Ref Range    Sodium 138 132 - 146 mmol/L    Potassium 3.9 3.5 - 5.0 mmol/L    Chloride 101 98 - 107 mmol/L    CO2 19 (L) 22 - 29 mmol/L    Anion Gap 18 (H) 7 - 16 mmol/L    Glucose 105 (H) 74 - 99 mg/dL    BUN 11 6 - 20 mg/dL    Creatinine 0.8 0.5 - 1.0 mg/dL    Est, Glom Filt Rate >60 >=60 mL/min/1.73    Calcium 10.0 8.6 - 10.2 mg/dL    Total Protein 8.8 (H) 6.4 - 8.3 g/dL    Albumin 4.9 3.5 - 5.2 g/dL    Total Bilirubin 0.6 0.0 - 1.2 mg/dL    Alkaline Phosphatase 88 35 - 104 U/L    ALT 14 0 - 32 U/L    AST 18 0 - 31 U/L   Magnesium   Result Value Ref Range    Magnesium 2.0 1.6 - 2.6 mg/dL   Troponin   Result Value Ref Range    Troponin, High Sensitivity <6 0 - 9 ng/L   Lactic Acid   Result Value Ref Range    Lactic Acid 4.9 (HH) 0.5 - 2.2 mmol/L   Lipase   Result Value Ref Range    Lipase 15 13 - 60 U/L   Lactic Acid   Result Value Ref Range    Lactic Acid 2.0 0.5 - 2.2 mmol/L   Lactic Acid   Result Value Ref Range    Lactic Acid 3.9 (HH) 0.5 - 2.2 mmol/L   Ammonia   Result Value Ref Range    Ammonia 32.0 11.0 - 51.0 umol/L   Vitamin B12   Result Value Ref Range    Vitamin B-12 <150 (L) 211 - 946 pg/mL   TSH   Result Value Ref Range    TSH 0.267 (L) 0.270 - 4.200 uIU/mL   Urinalysis with Microscopic   Result Value Ref Range    Color, UA Yellow Straw/Yellow    Clarity, UA SL CLOUDY Clear    Glucose, Ur Negative Negative mg/dL    Bilirubin Urine Negative Negative    Ketones, Urine Negative Negative mg/dL    Specific Gravity, UA 1.010 1.005 - 1.030    Blood, Urine SMALL (A) Negative    pH, UA 6.0 5.0 - 9.0    Protein, UA Negative Negative mg/dL    Urobilinogen, Urine 1.0 <2.0 E.U./dL    Nitrite, Urine Negative Negative    Leukocyte Esterase, Urine MODERATE (A) Negative    WBC, UA 5-10 (A) 0 - 5 /HPF    RBC, UA NONE 0 - 2 /HPF    Epithelial Cells, UA FEW /HPF    Bacteria, UA FEW (A) None Seen /HPF    Trichomonas, UA Present (A) None Seen /HPF   Troponin   Result Value Ref Range    Troponin, High Sensitivity <6 0 - 9 ng/L   Lactic Acid   Result Value Ref Range    Lactic Acid 0.9 0.5 - 2.2 mmol/L   CBC with Auto Differential   Result Value Ref Range    WBC 4.6 4.5 - 11.5 E9/L    RBC 3.46 (L) 3.50 - 5.50 E12/L    Hemoglobin 10.1 (L) 11.5 - 15.5 g/dL    Hematocrit 30.7 (L) 34.0 - 48.0 %    MCV 88.7 80.0 - 99.9 fL    MCH 29.2 26.0 - 35.0 pg    MCHC 32.9 32.0 - 34.5 %    RDW 14.3 11.5 - 15.0 fL    Platelets 462 982 - 807 E9/L    MPV 9.1 7.0 - 12.0 fL    Neutrophils % 59.4 43.0 - 80.0 %    Immature Granulocytes % 0.2 0.0 - 5.0 %    Lymphocytes % 32.1 20.0 - 42.0 %    Monocytes % 7.9 2.0 - 12.0 %    Eosinophils % 0.2 0.0 - 6.0 %    Basophils % 0.2 0.0 - 2.0 %    Neutrophils Absolute 2.70 1.80 - 7.30 E9/L    Immature Granulocytes # 0.01 E9/L    Lymphocytes Absolute 1.46 (L) 1.50 - 4.00 E9/L    Monocytes Absolute 0.36 0.10 - 0.95 E9/L    Eosinophils Absolute 0.01 (L) 0.05 - 0.50 E9/L    Basophils Absolute 0.01 0.00 - 0.20 E9/L   Comprehensive Metabolic Panel w/ Reflex to MG   Result Value Ref Range    Sodium 137 132 - 146 mmol/L    Potassium reflex Magnesium 3.2 (L) 3.5 - 5.0 mmol/L    Chloride 104 98 - 107 mmol/L    CO2 22 22 - 29 mmol/L    Anion Gap 11 7 - 16 mmol/L    Glucose 96 74 - 99 mg/dL    BUN 8 6 - 20 mg/dL    Creatinine 0.8 0.5 - 1.0 mg/dL    Est, Glom Filt Rate >60 >=60 mL/min/1.73    Calcium 8.2 (L) 8.6 - 10.2 mg/dL Total Protein 6.5 6.4 - 8.3 g/dL    Albumin 3.8 3.5 - 5.2 g/dL    Total Bilirubin 0.5 0.0 - 1.2 mg/dL    Alkaline Phosphatase 66 35 - 104 U/L    ALT 9 0 - 32 U/L    AST 13 0 - 31 U/L   Magnesium   Result Value Ref Range    Magnesium 1.9 1.6 - 2.6 mg/dL   POCT Glucose   Result Value Ref Range    Meter Glucose 72 (L) 74 - 99 mg/dL   POCT Glucose   Result Value Ref Range    Meter Glucose 84 74 - 99 mg/dL   EKG 12 Lead   Result Value Ref Range    Ventricular Rate 106 BPM    Atrial Rate 107 BPM    P-R Interval 146 ms    QRS Duration 82 ms    Q-T Interval 358 ms    QTc Calculation (Bazett) 475 ms    P Axis 61 degrees    R Axis 30 degrees    T Axis 24 degrees   EKG 12 Lead   Result Value Ref Range    Ventricular Rate 78 BPM    Atrial Rate 78 BPM    P-R Interval 148 ms    QRS Duration 90 ms    Q-T Interval 418 ms    QTc Calculation (Bazett) 476 ms    P Axis 70 degrees    R Axis 27 degrees    T Axis 22 degrees   EKG 12 Lead   Result Value Ref Range    Ventricular Rate 78 BPM    Atrial Rate 78 BPM    P-R Interval 148 ms    QRS Duration 90 ms    Q-T Interval 418 ms    QTc Calculation (Bazett) 476 ms    P Axis 70 degrees    R Axis 27 degrees    T Axis 22 degrees       RADIOLOGY:  CT ABDOMEN PELVIS W IV CONTRAST Additional Contrast? None   Final Result   1. No evidence of pulmonary embolism on somewhat limited examination of   smaller pulmonary artery branches. 2. No acute cardiopulmonary disease. 3. No acute abnormality within the abdomen or pelvis. CTA PULMONARY W CONTRAST   Final Result   1. No evidence of pulmonary embolism on somewhat limited examination of   smaller pulmonary artery branches. 2. No acute cardiopulmonary disease. 3. No acute abnormality within the abdomen or pelvis. XR CHEST PORTABLE   Final Result   No acute process. CT HEAD WO CONTRAST   Final Result   No acute intracranial abnormality.       However there is subtle symmetric basal ganglia density which is rather faint   and nonspecific but can be seen with normal aging brain, abnormal parathyroid   metabolism, or multiple other etiologies. The sella is prominent without mass identified. If further imaging needed consider MRI.             -------------------- NURSING NOTES & VITALS --------------------    The nursing notes within the ED encounter and vital signs have been reviewed. Vitals:    01/26/23 0915   BP: 114/72   Pulse: 95   Resp: 18   Temp: 97.7 °F (36.5 °C)   SpO2: 100%        Patient Vitals for the past 8 hrs:   BP Temp Temp src Pulse Resp SpO2 Weight   01/26/23 0915 114/72 97.7 °F (36.5 °C) Oral 95 18 100 % --   01/26/23 0749 101/70 98.1 °F (36.7 °C) Oral 77 18 100 % --   01/26/23 0454 -- -- -- -- -- -- 193 lb (87.5 kg)       Oxygen Saturation Interpretation: Normal      -------------------- PROGRESS NOTES --------------------  Counseling:  I have spoken with the patient and discussed todays results, in addition to providing specific details for the plan of care and counseling regarding the diagnosis and prognosis. Their questions are answered at this time and they are agreeable with the plan of admission.    -------------------- ADDITIONAL PROVIDER NOTES --------------------    Admission Consultation:  Time: 1807. Spoke with Dr. Duane Dutta. Discussed case. They will admit the patient. This patient's ED course included: a personal history and physicial examination, re-evaluation prior to disposition, multiple bedside re-evaluations, and IV medications    Diagnosis:  1. Abdominal pain, unspecified abdominal location        Disposition:  Patient's disposition: Admit to telemetry  Patient's condition is good. Rhonda Davis MD      *NOTE: This report was transcribed using voice recognition software. Every effort was made to ensure accuracy; however, inadvertent computerized transcription errors may be present.           Rhonda Davis MD  Resident  01/26/23 2090

## 2023-01-25 NOTE — TELEPHONE ENCOUNTER
This [facility] was to follow up with Roland Dai today to ensure she received the AD packet that was mailed out to her x2.   [facility] did not place call as Roland Malaika is currently in the ER/Hospital.     Jourdan Kraft will follow accordingly

## 2023-01-25 NOTE — LETTER
[address] [place] [address] Dr Department Medicaid  CERTIFICATION OF NECESSITY  FOR NON-EMERGENCY TRANSPORTATION   BY GROUND AMBULANCE      Individual Information   1. Name: Miriam Goddard 2. [address] [place] [address] Dr Medicaid Billing Number:    3. Address: [facility]      Transportation Provider Information   4. Provider Name:    5. [address] [place] [address] Dr Medicaid Provider Number:  National Provider Identifier (NPI):      Certification  7. Criteria:  During transport, this individual requires:  [x] Medical treatment or continuous     supervision by an EMT. [] The administration or regulation of oxygen by another person. [] Supervised protective restraint. 8. Period Beginning Date:    5. Length  [x] Not more than 1 day(s)  [] One Year     Additional Information Relevant to Certification   10. Comments or Explanations, If Necessary or Appropriate          Certifying Practitioner Information   11. Name of Practitioner: ***   12. [address] [place] [address] Dr Medicaid Provider Number, If Applicable: *** 13. National Provider Identifier (NPI): ***     Signature Information   14. Date of Signature: *** 15. Name of Person Signing: ***   12. Signature and Professional Designation: ***     [facility] [id number]  Rev. 2015    [address] [place] [address] Encounter Date/Time: 2023 [facility]    Hospital Account: [de-identified]    MRN: [id number]    Patient: Miriam Goddard    Contact Serial #: [id number]      ENCOUNTER          Patient Class: I Private Enc?   No Unit RM BD: [id number]   Hospital Service: MED   Encounter DX: Abdominal pain, unspecif*   ADM Provider: Freida Arnold MD   Procedure:     ATT Provider: Fidel Lott MD   REF Provider:        Admission DX: Abdominal pain, unspecified abdominal location, AMS (altered mental status) and DX codes: R10.9, R41.82      PATIENT  Name: Miriam Goddard : 1980 (42 yrs)   Address: [address] [place] [address] Sex: Female   [facility]          Marital Status: Single   Employer: DISABLED         Confucianism: Protestant   Primary Care Provider: Em Gonzalez MD         Primary Phone: [phone]   EMERGENCY CONTACT   Contact Name Legal Guardian? Relationship to Patient Home Phone Work Phone   1. leonardo arguello  2. nabil nelson No  No Other  Other                   GUARANTOR            Guarantor: Alondra Santamaria     : 1980   Address: [place] [address] Sex: Female   [place] [address]     Relation to Patient: Self       Home Phone: [phone]   Guarantor ID: [id number]       Work Phone:     Guarantor Employer: DISABLED         Status: DISABLED      COVERAGE        PRIMARY INSURANCE   Payor: AETNA MEDICARE Plan: [facility] MEDICARE ADVANTAGE*   Payor Address: [facility] [id number],  [address]       Group Number: [id number] Insurance Type: [id number] Name: Linda Drew : 1980   Subscriber ID: [id number] Pat. Rel. to Sub: Self   SECONDARY INSURANCE   Payor: [place]ICA[place] Plan: [facility], [facility] DEPT OF*   Payor Address:  [facility] [address], [place] [address]          Group Number:   Insurance Type: INDEMNITY   Subscriber Name: Linda Drew : 1980   Subscriber ID: [id number] Pat.  Rel. to Sub: SELF      CSN: [id number]

## 2023-01-25 NOTE — H&P
Hospitalist History & Physical      PATIENT NAME:  Alejandro Angulo    MRN:  [id number]  SERVICE DATE:  01/25/23    Primary Care Physician: Joey Mtz MD       SUBJECTIVE  CHIEF COMPLAINT:  had concerns including Abdominal Pain (Abd pain with nausea ans vomiting since last night ). HPI:  Ms. Alejandro Angulo, a 43y.o. year old female  who  has a past medical history of Anxiety and depression, Breast disorder, Cancer ([address] Cervical cancer ([address] Depression, Eclampsia, Herpes simplex virus (HSV) infection, Hypothyroid, Mental disorder, Seizures ([address] and Thyroid disease. presents with abdominal pain, nausea and vomiting for a day. But also noted to have AMS with seizure like activity. No headache or vision changes no limb weakness or tingling, no fever or chills chest pain or SOB. PAST MEDICAL HISTORY:    Past Medical History:   Diagnosis Date    Anxiety and depression     Breast disorder     Cancer ([address]     Cervical    Cervical cancer ([address]     pt doesn't remember when diagnosed, pt states she has been ca free for more than 5 years    Depression     Eclampsia 03/2011    Herpes simplex virus (HSV) infection     Hypothyroid     Mental disorder     Seizures ([address]     Thyroid disease      PAST SURGICAL HISTORY:    Past Surgical History:   Procedure Laterality Date    BREAST LUMPECTOMY Bilateral     CERVIX SURGERY      DILATION AND CURETTAGE OF UTERUS      INNER EAR SURGERY Left     LEG SURGERY Right      FAMILY HISTORY:  History reviewed. No pertinent family history.   SOCIAL HISTORY:    Social History     Socioeconomic History    Marital status: Single     Spouse name: Not on file    Number of children: 2    Years of education: 12    Highest education level: High school graduate   Occupational History    Not on file   Tobacco Use    Smoking status: Some Days     Types: Cigarettes    Smokeless tobacco: Never   Vaping Use    Vaping Use: Never used   Substance and Sexual Activity    Alcohol use: Not Currently     Comment: socially-holidays    Drug use: No    Sexual activity: Yes     Partners: Male   Other Topics Concern    Not on file   Social History Narrative    Not on file     Social Determinants of Health     Financial Resource Strain: High Risk    Difficulty of Paying Living Expenses: Very hard   Food Insecurity: No Food Insecurity    Worried About Running Out of Food in the Last Year: Never true    Ran Out of Food in the Last Year: Never true   Transportation Needs: Unmet Transportation Needs    Lack of Transportation (Medical): Yes    Lack of Transportation (Non-Medical): Yes   Physical Activity: Inactive    Days of Exercise per Week: 0 days    Minutes of Exercise per Session: 0 min   Stress: No Stress Concern Present    Feeling of Stress : Only a little   Social Connections: Socially Isolated    Frequency of Communication with Friends and Family: More than three times a week    Frequency of Social Gatherings with Friends and Family: More than three times a week    Attends Spiritism Services: Never    Active Member of Clubs or Organizations: No    Attends Club or Organization Meetings: Never    Marital Status: Never    Intimate Partner Violence: Not on file   Housing Stability: Not on file    TOBACCO:   reports that she has been smoking cigarettes. She has never used smokeless tobacco.  ETOH:   reports that she does not currently use alcohol. MEDICATIONS:   Prior to Admission medications    Medication Sig Start Date End Date Taking? Authorizing Provider   nitrofurantoin, macrocrystal-monohydrate, (MACROBID) 100 MG capsule Take 1 capsule by mouth 2 times daily for 7 days 1/18/23 1/25/23  Tyson Orellana MD   naproxen (NAPROSYN) 500 MG tablet Take 1 tablet by mouth 2 times daily as needed for Pain (take with food and full glass of water.) 11/16/22 12/9/22  CARYL Anguiano CNP        ALLERGIES: Patient has no known allergies.     REVIEW OF SYSTEM:   ROS as noted in HPI, 12 point ROS reviewed and otherwise negative. OBJECTIVE  PHYSICAL EXAM:   Vitals:    01/25/23 1615 01/25/23 1630 01/25/23 1700 01/25/23 1730   BP:   115/81 109/70   Pulse:   100 (!) 106   Resp:   15 14   Temp:       SpO2: 98% 96%     Weight:           General appearance: alert, appears stated age and cooperative  CONSTITUTIONAL:  no apparent distress  ENT:  normocephalic, without obvious abnormality, atraumatic  NECK:  supple, symmetrical, trachea midline  Heart: regular rate and rhythm, S1, S2 normal   Lungs: clear to auscultation bilaterally  Abdomen: soft lax, not tender, not distended, positive bowel sounds  Extremities: extremities normal, atraumatic, no cyanosis, edema  Skin: Normal skin color. No rashes or lesions. Neurologic:  Neurovascularly intact without any focal sensory/motor deficits. Cranial nerves: II-XII intact, grossly non-focal.  Psychiatric: Alert and oriented, thought content appropriate, normal insight, flat affect      DATA:     Diagnostic tests reviewed for today's visit:    Most recent labs and imaging results reviewed.    Labs:   Recent Results (from the past 72 hour(s))   CBC with Auto Differential    Collection Time: 01/25/23 12:38 PM   Result Value Ref Range    WBC 6.6 4.5 - 11.5 E9/L    RBC 4.39 3.50 - 5.50 E12/L    Hemoglobin 12.5 11.5 - 15.5 g/dL    Hematocrit 36.6 34.0 - 48.0 %    MCV 83.4 80.0 - 99.9 fL    MCH 28.5 26.0 - 35.0 pg    MCHC 34.2 32.0 - 34.5 %    RDW 14.3 11.5 - 15.0 fL    Platelets 460 132 - 960 E9/L    MPV 9.3 7.0 - 12.0 fL    Neutrophils % 83.7 (H) 43.0 - 80.0 %    Immature Granulocytes % 0.3 0.0 - 5.0 %    Lymphocytes % 12.0 (L) 20.0 - 42.0 %    Monocytes % 3.8 2.0 - 12.0 %    Eosinophils % 0.0 0.0 - 6.0 %    Basophils % 0.2 0.0 - 2.0 %    Neutrophils Absolute 5.50 1.80 - 7.30 E9/L    Immature Granulocytes # 0.02 E9/L    Lymphocytes Absolute 0.79 (L) 1.50 - 4.00 E9/L    Monocytes Absolute 0.25 0.10 - 0.95 E9/L    Eosinophils Absolute 0.00 (L) 0.05 - 0.50 E9/L    Basophils Absolute 0. 01 0.00 - 0.20 E9/L   Comprehensive Metabolic Panel    Collection Time: 01/25/23 12:38 PM   Result Value Ref Range    Sodium 138 132 - 146 mmol/L    Potassium 3.9 3.5 - 5.0 mmol/L    Chloride 101 98 - 107 mmol/L    CO2 19 (L) 22 - 29 mmol/L    Anion Gap 18 (H) 7 - 16 mmol/L    Glucose 105 (H) 74 - 99 mg/dL    BUN 11 6 - 20 mg/dL    Creatinine 0.8 0.5 - 1.0 mg/dL    Est, Glom Filt Rate >60 >=60 mL/min/1.73    Calcium 10.0 8.6 - 10.2 mg/dL    Total Protein 8.8 (H) 6.4 - 8.3 g/dL    Albumin 4.9 3.5 - 5.2 g/dL    Total Bilirubin 0.6 0.0 - 1.2 mg/dL    Alkaline Phosphatase 88 35 - 104 U/L    ALT 14 0 - 32 U/L    AST 18 0 - 31 U/L   Magnesium    Collection Time: 01/25/23 12:38 PM   Result Value Ref Range    Magnesium 2.0 1.6 - 2.6 mg/dL   Troponin    Collection Time: 01/25/23 12:38 PM   Result Value Ref Range    Troponin, High Sensitivity <6 0 - 9 ng/L   Lactic Acid    Collection Time: 01/25/23 12:38 PM   Result Value Ref Range    Lactic Acid 4.9 (HH) 0.5 - 2.2 mmol/L   Lipase    Collection Time: 01/25/23 12:38 PM   Result Value Ref Range    Lipase 15 13 - 60 U/L   POCT Glucose    Collection Time: 01/25/23 12:46 PM   Result Value Ref Range    Meter Glucose 72 (L) 74 - 99 mg/dL   COVID-19, Rapid    Collection Time: 01/25/23 12:54 PM    Specimen: Nasopharyngeal Swab   Result Value Ref Range    SARS-CoV-2, NAAT Not Detected Not Detected   RAPID INFLUENZA A/B ANTIGENS    Collection Time: 01/25/23 12:54 PM    Specimen: Nasopharyngeal   Result Value Ref Range    Influenza A by PCR Not Detected Not Detected    Influenza B by PCR Not Detected Not Detected   EKG 12 Lead    Collection Time: 01/25/23  1:23 PM   Result Value Ref Range    Ventricular Rate 106 BPM    Atrial Rate 107 BPM    P-R Interval 146 ms    QRS Duration 82 ms    Q-T Interval 358 ms    QTc Calculation (Bazett) 475 ms    P Axis 61 degrees    R Axis 30 degrees    T Axis 24 degrees     Oupatient labs:  Lab Results   Component Value Date    CHOL 212 (H) 06/20/2022 TRIG 63 06/20/2022    HDL 49 06/20/2022    LDLCALC 150 (H) 06/20/2022    TSH 1.170 11/16/2022       Urinalysis:    Lab Results   Component Value Date/Time    NITRU POSITIVE 06/09/2022 03:30 PM    WBCUA 2-5 06/09/2022 03:30 PM    BACTERIA MANY 06/09/2022 03:30 PM    RBCUA 0-1 06/09/2022 03:30 PM    BLOODU Moderate 01/18/2023 01:27 PM    BLOODU TRACE-INTACT 06/09/2022 03:30 PM    SPECGRAV 1.030 01/18/2023 01:27 PM    SPECGRAV 1.025 06/09/2022 03:30 PM    GLUCOSEU neg 01/18/2023 01:27 PM    GLUCOSEU Negative 06/09/2022 03:30 PM       Imaging:  CT HEAD WO CONTRAST    Result Date: 1/25/2023  EXAMINATION: CT OF THE HEAD WITHOUT CONTRAST  1/25/2023 1:17 pm TECHNIQUE: CT of the head was performed without the administration of intravenous contrast. Automated exposure control, iterative reconstruction, and/or weight based adjustment of the mA/kV was utilized to reduce the radiation dose to as low as reasonably achievable. COMPARISON: None. HISTORY: ORDERING SYSTEM PROVIDED HISTORY: Seizure TECHNOLOGIST PROVIDED HISTORY: Reason for exam:->Seizure Has a \"code stroke\" or \"stroke alert\" been called? ->No Decision Support Exception - unselect if not a suspected or confirmed emergency medical condition->Emergency Medical Condition (MA) What reading provider will be dictating this exam?->CRC FINDINGS: BRAIN/VENTRICLES: There is no acute intracranial hemorrhage, mass effect or midline shift. No abnormal extra-axial fluid collection. The gray-white differentiation is maintained without evidence of an acute infarct. There is no evidence of hydrocephalus. There is slight increased density at the globus pallidus of questionable etiology. The sella is prominent measuring 17 mm AP. Associated mass is not identified. ORBITS: The visualized portion of the orbits demonstrate no acute abnormality. SINUSES: The visualized paranasal sinuses and mastoid air cells demonstrate no acute abnormality.  SOFT TISSUES/SKULL:  No acute abnormality of the visualized skull or soft tissues. No acute intracranial abnormality. However there is subtle symmetric basal ganglia density which is rather faint and nonspecific but can be seen with normal aging brain, abnormal parathyroid metabolism, or multiple other etiologies. The sella is prominent without mass identified. If further imaging needed consider MRI. CT ABDOMEN PELVIS W IV CONTRAST Additional Contrast? None    Result Date: 1/25/2023  EXAMINATION: CTA OF THE CHEST; CT OF THE ABDOMEN AND PELVIS WITH CONTRAST 1/25/2023 3:27 pm TECHNIQUE: CTA of the chest was performed after the administration of intravenous contrast.  Multiplanar reformatted images are provided for review. MIP images are provided for review. Automated exposure control, iterative reconstruction, and/or weight based adjustment of the mA/kV was utilized to reduce the radiation dose to as low as reasonably achievable.; CT of the abdomen and pelvis was performed with the administration of intravenous contrast. Multiplanar reformatted images are provided for review. Automated exposure control, iterative reconstruction, and/or weight based adjustment of the mA/kV was utilized to reduce the radiation dose to as low as reasonably achievable. COMPARISON: CT abdomen and pelvis 06/09/2022 HISTORY: ORDERING SYSTEM PROVIDED HISTORY: Hypoxia TECHNOLOGIST PROVIDED HISTORY: Reason for exam:->Hypoxia Decision Support Exception - unselect if not a suspected or confirmed emergency medical condition->Emergency Medical Condition (MA) What reading provider will be dictating this exam?->CRC FINDINGS: CTA chest: There is adequate opacification of the pulmonary arteries. Examination is mildly degraded by respiratory motion. However, there is no evidence of thoracic aortic aneurysm or dissection. There is no evidence of pleural or pericardial effusion. There is no evidence of lymphadenopathy within the thorax. The central airways are widely patent.   There is no pneumothorax. There is no focal alveolar consolidation. There is mild dependent subsegmental atelectasis and/or scarring. There are degenerative changes within the spine. CT abdomen and pelvis: Images of the upper abdomen are degraded by artifact from patient's right arm by her side and left arm across the upper abdomen. However, the liver, spleen, pancreas, adrenal glands, and kidneys are unremarkable. The gallbladder is intact without evidence of biliary ductal dilatation. There is no evidence of bowel obstruction, pneumoperitoneum, or ascites. There is partially calcified mass in the posterior uterine fundus suggestive of degenerating leiomyoma. The uterus and adnexa are otherwise within normal limits. There is evidence of prior tubal ligation. The appendix is unremarkable in appearance. There are degenerative changes within the spine. 1. No evidence of pulmonary embolism on somewhat limited examination of smaller pulmonary artery branches. 2. No acute cardiopulmonary disease. 3. No acute abnormality within the abdomen or pelvis. XR CHEST PORTABLE    Result Date: 1/25/2023  EXAMINATION: ONE XRAY VIEW OF THE CHEST 1/25/2023 2:07 pm COMPARISON: None. HISTORY: ORDERING SYSTEM PROVIDED HISTORY: Abdominal pain TECHNOLOGIST PROVIDED HISTORY: Reason for exam:->Abdominal pain What reading provider will be dictating this exam?->CRC FINDINGS: The lungs are without acute focal process. There is no effusion or pneumothorax. The cardiomediastinal silhouette is without acute process. The osseous structures are without acute process. No acute process. CTA PULMONARY W CONTRAST    Result Date: 1/25/2023  EXAMINATION: CTA OF THE CHEST; CT OF THE ABDOMEN AND PELVIS WITH CONTRAST 1/25/2023 3:27 pm TECHNIQUE: CTA of the chest was performed after the administration of intravenous contrast.  Multiplanar reformatted images are provided for review. MIP images are provided for review.  Automated exposure control, iterative reconstruction, and/or weight based adjustment of the mA/kV was utilized to reduce the radiation dose to as low as reasonably achievable.; CT of the abdomen and pelvis was performed with the administration of intravenous contrast. Multiplanar reformatted images are provided for review. Automated exposure control, iterative reconstruction, and/or weight based adjustment of the mA/kV was utilized to reduce the radiation dose to as low as reasonably achievable. COMPARISON: CT abdomen and pelvis 06/09/2022 HISTORY: ORDERING SYSTEM PROVIDED HISTORY: Hypoxia TECHNOLOGIST PROVIDED HISTORY: Reason for exam:->Hypoxia Decision Support Exception - unselect if not a suspected or confirmed emergency medical condition->Emergency Medical Condition (MA) What reading provider will be dictating this exam?->CRC FINDINGS: CTA chest: There is adequate opacification of the pulmonary arteries. Examination is mildly degraded by respiratory motion. However, there is no evidence of thoracic aortic aneurysm or dissection. There is no evidence of pleural or pericardial effusion. There is no evidence of lymphadenopathy within the thorax. The central airways are widely patent. There is no pneumothorax. There is no focal alveolar consolidation. There is mild dependent subsegmental atelectasis and/or scarring. There are degenerative changes within the spine. CT abdomen and pelvis: Images of the upper abdomen are degraded by artifact from patient's right arm by her side and left arm across the upper abdomen. However, the liver, spleen, pancreas, adrenal glands, and kidneys are unremarkable. The gallbladder is intact without evidence of biliary ductal dilatation. There is no evidence of bowel obstruction, pneumoperitoneum, or ascites. There is partially calcified mass in the posterior uterine fundus suggestive of degenerating leiomyoma. The uterus and adnexa are otherwise within normal limits.   There is evidence of prior tubal ligation. The appendix is unremarkable in appearance. There are degenerative changes within the spine. 1. No evidence of pulmonary embolism on somewhat limited examination of smaller pulmonary artery branches. 2. No acute cardiopulmonary disease. 3. No acute abnormality within the abdomen or pelvis. CT ABDOMEN PELVIS W IV CONTRAST Additional Contrast? None   Final Result   1. No evidence of pulmonary embolism on somewhat limited examination of   smaller pulmonary artery branches. 2. No acute cardiopulmonary disease. 3. No acute abnormality within the abdomen or pelvis. CTA PULMONARY W CONTRAST   Final Result   1. No evidence of pulmonary embolism on somewhat limited examination of   smaller pulmonary artery branches. 2. No acute cardiopulmonary disease. 3. No acute abnormality within the abdomen or pelvis. XR CHEST PORTABLE   Final Result   No acute process. CT HEAD WO CONTRAST   Final Result   No acute intracranial abnormality. However there is subtle symmetric basal ganglia density which is rather faint   and nonspecific but can be seen with normal aging brain, abnormal parathyroid   metabolism, or multiple other etiologies. The sella is prominent without mass identified. If further imaging needed consider MRI. ASSESSMENT AND PLAN  Active Problems:    * No active hospital problems. *  Resolved Problems:    * No resolved hospital problems.  *    - Abdominal pain  - elevated lactic acid  - Seizure like activity   - anxiety and depression       Plan:  - admit to tele monitoring   - serial troponin and EKG  - fall precautions   - Seizure precautions   - PT/OT  - Neuro checks   - Neurology consult for further recommendations   - Oxygen supplement   - trend lactic acid  - check ammonia, TSH, B12, urinalysis         VTE Prophylaxis: low molecular weight heparin -    DVT Prophylaxis: [x]Lovenox []Heparin []PCD [] [address] []Encouraged ambulation    Diet: No diet orders on file  Code Status: Prior  Surrogate decision maker confirmed with patient:  Primary Emergency Contact: leonardo arguello      Disposition: []Med/Surg [x] Intermediate [] ICU/CCU   Admit status: [] Observation [x] Inpatient       Additional work up or/and treatment plan may be added today or thereafter based on clinical progression. I am managing a portion of pt care. Some medical issues are handled by other specialists. Additional work up and treatment should be done by my colleague hospitalist and at out pt setting by pt PCP and other out pt providers.      Hayden Sepulveda MD  DATE: January 25, 2023

## 2023-01-25 NOTE — PROGRESS NOTES
Name: Daryl Or  : 1980  MRN: [id number]    Date: 2023    Benefits of immediately proceeding with Radiology exam outweigh the risks and therefore the following is being waived:      [x] Pregnancy test    [] Protocol for Iodine allergy    [] MRI questionnaire    [x] Chris Mercedes MD

## 2023-01-25 NOTE — ED NOTES
Patient roomed from EMS. Reports feeling \"tingling\" in RUE, followed by seizure-like activity. Physician notified and at bedside.        Med Schuster RN  01/25/23 [id number]

## 2023-01-26 ENCOUNTER — APPOINTMENT (OUTPATIENT)
Dept: NEUROLOGY | Age: 43
DRG: 880 | End: 2023-01-26
Payer: MEDICARE

## 2023-01-26 LAB
ALBUMIN SERPL-MCNC: 3.8 G/DL (ref 3.5–5.2)
ALP BLD-CCNC: 66 U/L (ref 35–104)
ALT SERPL-CCNC: 9 U/L (ref 0–32)
ANION GAP SERPL CALCULATED.3IONS-SCNC: 11 MMOL/L (ref 7–16)
AST SERPL-CCNC: 13 U/L (ref 0–31)
BACTERIA: ABNORMAL /HPF
BASOPHILS ABSOLUTE: 0.01 E9/L (ref 0–0.2)
BASOPHILS RELATIVE PERCENT: 0.2 % (ref 0–2)
BILIRUB SERPL-MCNC: 0.5 MG/DL (ref 0–1.2)
BILIRUBIN URINE: NEGATIVE
BLOOD, URINE: ABNORMAL
BUN BLDV-MCNC: 8 MG/DL (ref 6–20)
CALCIUM SERPL-MCNC: 8.2 MG/DL (ref 8.6–10.2)
CHLORIDE BLD-SCNC: 104 MMOL/L (ref 98–107)
CLARITY: ABNORMAL
CO2: 22 MMOL/L (ref 22–29)
COLOR: YELLOW
CREAT SERPL-MCNC: 0.8 MG/DL (ref 0.5–1)
EKG ATRIAL RATE: 78 BPM
EKG ATRIAL RATE: 78 BPM
EKG P AXIS: 70 DEGREES
EKG P AXIS: 70 DEGREES
EKG P-R INTERVAL: 148 MS
EKG P-R INTERVAL: 148 MS
EKG Q-T INTERVAL: 418 MS
EKG Q-T INTERVAL: 418 MS
EKG QRS DURATION: 90 MS
EKG QRS DURATION: 90 MS
EKG QTC CALCULATION (BAZETT): 476 MS
EKG QTC CALCULATION (BAZETT): 476 MS
EKG R AXIS: 27 DEGREES
EKG R AXIS: 27 DEGREES
EKG T AXIS: 22 DEGREES
EKG T AXIS: 22 DEGREES
EKG VENTRICULAR RATE: 78 BPM
EKG VENTRICULAR RATE: 78 BPM
EOSINOPHILS ABSOLUTE: 0.01 E9/L (ref 0.05–0.5)
EOSINOPHILS RELATIVE PERCENT: 0.2 % (ref 0–6)
EPITHELIAL CELLS, UA: ABNORMAL /HPF
GFR SERPL CREATININE-BSD FRML MDRD: >60 ML/MIN/1.73
GLUCOSE BLD-MCNC: 96 MG/DL (ref 74–99)
GLUCOSE URINE: NEGATIVE MG/DL
HCT VFR BLD CALC: 30.7 % (ref 34–48)
HEMOGLOBIN: 10.1 G/DL (ref 11.5–15.5)
IMMATURE GRANULOCYTES #: 0.01 E9/L
IMMATURE GRANULOCYTES %: 0.2 % (ref 0–5)
KETONES, URINE: NEGATIVE MG/DL
LACTIC ACID: 0.9 MMOL/L (ref 0.5–2.2)
LEUKOCYTE ESTERASE, URINE: ABNORMAL
LYMPHOCYTES ABSOLUTE: 1.46 E9/L (ref 1.5–4)
LYMPHOCYTES RELATIVE PERCENT: 32.1 % (ref 20–42)
MAGNESIUM: 1.9 MG/DL (ref 1.6–2.6)
MCH RBC QN AUTO: 29.2 PG (ref 26–35)
MCHC RBC AUTO-ENTMCNC: 32.9 % (ref 32–34.5)
MCV RBC AUTO: 88.7 FL (ref 80–99.9)
MONOCYTES ABSOLUTE: 0.36 E9/L (ref 0.1–0.95)
MONOCYTES RELATIVE PERCENT: 7.9 % (ref 2–12)
NEUTROPHILS ABSOLUTE: 2.7 E9/L (ref 1.8–7.3)
NEUTROPHILS RELATIVE PERCENT: 59.4 % (ref 43–80)
NITRITE, URINE: NEGATIVE
PDW BLD-RTO: 14.3 FL (ref 11.5–15)
PH UA: 6 (ref 5–9)
PLATELET # BLD: 220 E9/L (ref 130–450)
PMV BLD AUTO: 9.1 FL (ref 7–12)
POTASSIUM REFLEX MAGNESIUM: 3.2 MMOL/L (ref 3.5–5)
PROTEIN UA: NEGATIVE MG/DL
RBC # BLD: 3.46 E12/L (ref 3.5–5.5)
RBC UA: ABNORMAL /HPF (ref 0–2)
SODIUM BLD-SCNC: 137 MMOL/L (ref 132–146)
SPECIFIC GRAVITY UA: 1.01 (ref 1–1.03)
TOTAL PROTEIN: 6.5 G/DL (ref 6.4–8.3)
TRICHOMONAS: PRESENT /HPF
TROPONIN, HIGH SENSITIVITY: <6 NG/L (ref 0–9)
UROBILINOGEN, URINE: 1 E.U./DL
WBC # BLD: 4.6 E9/L (ref 4.5–11.5)
WBC UA: ABNORMAL /HPF (ref 0–5)

## 2023-01-26 PROCEDURE — 6360000002 HC RX W HCPCS: Performed by: PSYCHIATRY & NEUROLOGY

## 2023-01-26 PROCEDURE — 93010 ELECTROCARDIOGRAM REPORT: CPT | Performed by: INTERNAL MEDICINE

## 2023-01-26 PROCEDURE — 95714 VEEG EA 12-26 HR UNMNTR: CPT

## 2023-01-26 PROCEDURE — 83735 ASSAY OF MAGNESIUM: CPT

## 2023-01-26 PROCEDURE — 6370000000 HC RX 637 (ALT 250 FOR IP): Performed by: FAMILY MEDICINE

## 2023-01-26 PROCEDURE — 95720 EEG PHY/QHP EA INCR W/VEEG: CPT | Performed by: PSYCHIATRY & NEUROLOGY

## 2023-01-26 PROCEDURE — 81001 URINALYSIS AUTO W/SCOPE: CPT

## 2023-01-26 PROCEDURE — 1200000000 HC SEMI PRIVATE

## 2023-01-26 PROCEDURE — 85025 COMPLETE CBC W/AUTO DIFF WBC: CPT

## 2023-01-26 PROCEDURE — 36415 COLL VENOUS BLD VENIPUNCTURE: CPT

## 2023-01-26 PROCEDURE — 6360000002 HC RX W HCPCS: Performed by: INTERNAL MEDICINE

## 2023-01-26 PROCEDURE — 2580000003 HC RX 258: Performed by: INTERNAL MEDICINE

## 2023-01-26 PROCEDURE — 83605 ASSAY OF LACTIC ACID: CPT

## 2023-01-26 PROCEDURE — 6370000000 HC RX 637 (ALT 250 FOR IP): Performed by: INTERNAL MEDICINE

## 2023-01-26 PROCEDURE — 84484 ASSAY OF TROPONIN QUANT: CPT

## 2023-01-26 PROCEDURE — 93005 ELECTROCARDIOGRAM TRACING: CPT | Performed by: INTERNAL MEDICINE

## 2023-01-26 PROCEDURE — 80053 COMPREHEN METABOLIC PANEL: CPT

## 2023-01-26 RX ORDER — POTASSIUM CHLORIDE 20 MEQ/1
40 TABLET, EXTENDED RELEASE ORAL 2 TIMES DAILY
Status: DISCONTINUED | OUTPATIENT
Start: 2023-01-26 | End: 2023-01-26

## 2023-01-26 RX ADMIN — POTASSIUM BICARBONATE 40 MEQ: 782 TABLET, EFFERVESCENT ORAL at 20:41

## 2023-01-26 RX ADMIN — ACETAMINOPHEN 650 MG: 325 TABLET ORAL at 07:30

## 2023-01-26 RX ADMIN — ONDANSETRON 4 MG: 2 INJECTION INTRAMUSCULAR; INTRAVENOUS at 19:23

## 2023-01-26 RX ADMIN — Medication 6.25 MG: at 12:52

## 2023-01-26 RX ADMIN — SODIUM CHLORIDE, PRESERVATIVE FREE 10 ML: 5 INJECTION INTRAVENOUS at 08:18

## 2023-01-26 RX ADMIN — ENOXAPARIN SODIUM 40 MG: 100 INJECTION SUBCUTANEOUS at 08:18

## 2023-01-26 RX ADMIN — POTASSIUM BICARBONATE 40 MEQ: 782 TABLET, EFFERVESCENT ORAL at 10:44

## 2023-01-26 RX ADMIN — SODIUM CHLORIDE, PRESERVATIVE FREE 10 ML: 5 INJECTION INTRAVENOUS at 20:41

## 2023-01-26 RX ADMIN — ACETAMINOPHEN 650 MG: 325 TABLET ORAL at 19:23

## 2023-01-26 RX ADMIN — ONDANSETRON 4 MG: 2 INJECTION INTRAMUSCULAR; INTRAVENOUS at 07:30

## 2023-01-26 ASSESSMENT — PAIN DESCRIPTION - DESCRIPTORS: DESCRIPTORS: THROBBING

## 2023-01-26 ASSESSMENT — PAIN SCALES - GENERAL
PAINLEVEL_OUTOF10: 0
PAINLEVEL_OUTOF10: 7
PAINLEVEL_OUTOF10: 0

## 2023-01-26 ASSESSMENT — PAIN DESCRIPTION - LOCATION: LOCATION: HEAD

## 2023-01-26 NOTE — PROGRESS NOTES
Patient's continuous started at 1500, patient had HV and photic after started per Dr. Kory Farr.   Alondra Burt 1/26/2023

## 2023-01-26 NOTE — PROGRESS NOTES
Occupational Therapy  OT SESSION ATTEMPT     Date:2023  Patient Name: Pankaj Lee  MRN: [id number]  : 1980  Room: [address] [place], [place] [address]     Attempted OT session this date:    [] unavailable due to other medical staff currently with pt   [x] on hold d/t multiple seizures this AM   [] on hold per nursing staff secondary to lab / radiology results    [] declined treatment  this date due to ____. Benefits of participation in therapy reviewed with pt.    [] off unit   [] Other:     Will reattempt OT eval at a later time.     ANABELL SalcedoR/L #[id number]

## 2023-01-26 NOTE — PROGRESS NOTES
Patient had two 30 second seizures witnessed by nursing staff about two minutes apart. Patient was then placed on tele, and put on seizure precautions.  Dr Sherry Rice notified through PS

## 2023-01-26 NOTE — CARE COORDINATION
01/26/23 Transition of Care: patient admitted due to c/o vomiting and abdominal pain. Patient with episode of seizure activity witnessed in the ER and on the floor last pm. She also had a seizure this am on the unit. She is being followed by neurology. She is ordered iv phenergan. She is on telemetry. PT and OT are pending. She resides with her son per notes. She follows with Kelsy Choi MD and her pharmacy is [facility]. Her [employer]  is Krishna Hebert at [phone]. She also has medicaid. Will do a full assessment when she is more awake. Will continue to follow for needs prior to discharge. Electronically signed by Iza Scott RN CM on 1/26/2023 at 3:27 PM

## 2023-01-26 NOTE — PROGRESS NOTES
Interim EEG Progress Note    EEG was reviewed from 1507 through 1754, 1/26/2023     Normal background noted during this period. One clinical event marked at 1. This was characterized by side to side shaking of the body, moaning vocalizations, and the patient was noted to reach with her right arm to press the nursing call button during the beginning of the event. No seizure activity was noted during this event. On video review this appears consistent with a non-epileptic event.      Anastasia Yuan DO, 5:51 PM, 1/26/2023

## 2023-01-26 NOTE — PLAN OF CARE
Problem: Pain  Goal: Verbalizes/displays adequate comfort level or baseline comfort level  1/26/2023 0005 by Jere Montalvo RN  Outcome: Progressing     Problem: Safety - Adult  Goal: Free from fall injury  1/26/2023 0005 by Jere Montalvo RN  Outcome: Progressing

## 2023-01-26 NOTE — CONSULTS
[facility]  Neurology Consult    Date:  1/26/2023  Patient Name:  Gonzalo Thorne  YOB: 1980  MRN: [id number]     PCP:  Wesley Vargas MD   Referring:  No ref. provider found      Chief Complaint: seizures    History obtained from: patient, family, chart    Assessment  Gonzalo Thorne is a 43 y.o. female admitted with concern for seizure. Video recording of event as well as video-EEG monitoring have captured spells consisting of arrhythmic, asynchronous shaking of all of her limbs with associated unresponsiveness. These both clinically and on EEG recording are consistent with functional non-epileptic spells. Plan  Avoid benzodiazepines  Supportive care  Will need outpatient referral for behavioral therapy  Will follow        History of Present Illness: Gonzalo Thorne is a 43 y.o. right handed female presenting for evaluation of seizures. The patient states that she began throwing up the day prior to admission. She began having tingling in both of her arms which spread all over and then lost consciousness for about 10 minutes. No tongue bite, +urinary incontinence. There is no family history of epilepsy. No history of TBI/concussion, CNS infections, stroke, or CNS neoplasm. She reportedly had one seizure 21-22 years ago around the time her daughter was born. She was recently placed on Macrobid for a UTI.        Medical History:   Past Medical History:   Diagnosis Date    Anxiety and depression     Breast disorder     Cancer ([address]     Cervical    Cervical cancer ([address]     pt doesn't remember when diagnosed, pt states she has been ca free for more than 5 years    Depression     Eclampsia 03/2011    Herpes simplex virus (HSV) infection     Hypothyroid     Mental disorder     Seizures ([address]     Thyroid disease         Surgical History:   Past Surgical History:   Procedure Laterality Date    BREAST LUMPECTOMY Bilateral     CERVIX SURGERY      DILATION AND CURETTAGE OF UTERUS      INNER EAR SURGERY Left     LEG SURGERY Right         Family History:   History reviewed. No pertinent family history.     Social History:  Social History     Tobacco Use    Smoking status: Some Days     Types: Cigarettes    Smokeless tobacco: Never   Vaping Use    Vaping Use: Never used   Substance Use Topics    Alcohol use: Not Currently     Comment: socially-holidays    Drug use: No        Current Medications:      Current Facility-Administered Medications   Medication Dose Route Frequency Provider Last Rate Last Admin    promethazine (PHENERGAN) 6.25 mg in sodium chloride 0.9% 50 mL IVPB  6.25 mg IntraVENous Q6H PRN Angelina Reese, DO   Stopped at 01/26/23 1321    potassium bicarb-citric acid (EFFER-K) effervescent tablet 40 mEq  40 mEq Oral BID Adam Guo MD   40 mEq at 01/26/23 1044    sodium chloride flush 0.9 % injection 10 mL  10 mL IntraVENous PRN Antonio Khan, DO   10 mL at 65/84/73 1529    sodium chloride flush 0.9 % injection 10 mL  10 mL IntraVENous 2 times per day Tova Aguila MD   10 mL at 01/26/23 0818    sodium chloride flush 0.9 % injection 10 mL  10 mL IntraVENous PRN Mau Field MD        0.9 % sodium chloride infusion   IntraVENous PRN Mau Field MD        enoxaparin (LOVENOX) injection 40 mg  40 mg SubCUTAneous Daily Mau Field MD   40 mg at 01/26/23 0818    ondansetron (ZOFRAN-ODT) disintegrating tablet 4 mg  4 mg Oral Q8H PRN Mau Field MD        Or    ondansetron [facility]) injection 4 mg  4 mg IntraVENous Q6H PRN aMu Field MD   4 mg at 01/26/23 0730    magnesium hydroxide (MILK OF MAGNESIA) 400 MG/5ML suspension 30 mL  30 mL Oral Daily PRN Tova Aguila MD        acetaminophen (TYLENOL) tablet 650 mg  650 mg Oral Q6H PRN Mau Field MD   650 mg at 01/26/23 0730    Or    acetaminophen (TYLENOL) suppository 650 mg  650 mg Rectal Q6H PRN Mau Field MD            Allergies:      No Known Allergies     Physical Examination  Vitals   Vitals:    01/26/23 0749 01/26/23 0915 01/26/23 1145 01/26/23 1734   BP: 101/70 114/72 (!) 141/77 (!) 101/56   Pulse: 77 95 (!) 122 100   Resp: 18 18  18   Temp: 98.1 °F (36.7 °C) 97.7 °F (36.5 °C)  97.6 °F (36.4 °C)   TempSrc: Oral Oral  Temporal   SpO2: 100% 100%  100%   Weight:       Height:            General: Patient appears in no acute distress. HEENT: Normocephalic, atraumatic  Chest: Clear to auscultation bilaterally  Heart: No murmurs appreciated  Extremities/Peripheral vascular: No edema/swelling noted. No cold limbs noted. Neurologic Examination    Mental Status  Alert, and oriented to person, place and time. Speech is fluent with intact comprehension. No evidence of memory impairment. Attention and concentration appeared normal.     Cranial Nerves  II. Visual fields full to confrontation bilaterally. Fundoscopic exam: Discs not well visualized. III, IV, VI: Pupils equally round and reactive to light, 3 to 2 mm bilaterally. EOMs: full, no nystagmus. V. Facial sensation intact to light touch bilaterally  VII: Facial movements symmetric and strong  VIII: Hearing intact to voice  IX,X: Palate elevates symmetrically.  No dysarthria  XI: Sternocleidomastoid and trapezius 5/5 bilaterally   XII: Tongue is midline    Motor     Right Left   Right Left   Deltoid 5 5  Hip Flexion 5 5   Biceps 5 5  Knee Extension 5 5   Triceps 5 5  Knee Flexion 5 5   Handgrip 5 5  Ankle Dorsiflexion 5 5       Ankle Plantarflexion 5 5       Pronator drift: absent bilaterally    Sensation  Light Touch: Intact distally in all four limbs    Reflexes     Right Left   Biceps 2 2   Brachioradialis 2 2   Patellar 2 2   Achilles 2 2   ankle clonus none none   Babinski absent absent     Coordination  Rapid alternating movements normal in bilateral upper extremities  Finger to nose testing normal bilaterally    Gait  Deferred for safety/fall consideration      Labs  Recent Labs     01/26/23  0318      K 3.2*      CO2 22   BUN 8   CREATININE 0.8   GLUCOSE 96   CALCIUM 8.2*   PROT 6.5   LABALBU 3.8   BILITOT 0.5   ALKPHOS 66   AST 13   ALT 9   WBC 4.6   RBC 3.46*   HGB 10.1*   HCT 30.7*   MCV 88.7   MCH 29.2   MCHC 32.9   RDW 14.3      MPV 9.1   LACTA 0.9       Imaging  CT ABDOMEN PELVIS W IV CONTRAST Additional Contrast? None   Final Result   1. No evidence of pulmonary embolism on somewhat limited examination of   smaller pulmonary artery branches. 2. No acute cardiopulmonary disease. 3. No acute abnormality within the abdomen or pelvis. CTA PULMONARY W CONTRAST   Final Result   1. No evidence of pulmonary embolism on somewhat limited examination of   smaller pulmonary artery branches. 2. No acute cardiopulmonary disease. 3. No acute abnormality within the abdomen or pelvis. XR CHEST PORTABLE   Final Result   No acute process. CT HEAD WO CONTRAST   Final Result   No acute intracranial abnormality. However there is subtle symmetric basal ganglia density which is rather faint   and nonspecific but can be seen with normal aging brain, abnormal parathyroid   metabolism, or multiple other etiologies. The sella is prominent without mass identified. If further imaging needed consider MRI.                  Electronically signed by Shae Rangel DO on 1/26/2023 at 5:56 PM

## 2023-01-26 NOTE — PROGRESS NOTES
Hospitalist Progress Note      SYNOPSIS: Patient admitted on 2023 for AMS (altered mental status)      SUBJECTIVE:    Patient seen and examined  Records reviewed. Stable overnight. No other overnight issues reported. Temp (24hrs), Av.9 °F (36.6 °C), Min:97.6 °F (36.4 °C), Max:98.1 °F (36.7 °C)    DIET: ADULT DIET; Regular  CODE: Full Code    Intake/Output Summary (Last 24 hours) at 2023 1844  Last data filed at 2023 1642  Gross per 24 hour   Intake 1756.5 ml   Output 1100 ml   Net 656.5 ml       OBJECTIVE:    BP (!) 101/56   Pulse 100   Temp 97.6 °F (36.4 °C) (Temporal)   Resp 18   Ht 5' 11\" (1.803 m)   Wt 193 lb (87.5 kg)   SpO2 100%   BMI 26.92 kg/m²     General appearance: No apparent distress, appears stated age and cooperative. HEENT:  Conjunctivae/corneas clear. Neck: Supple. No jugular venous distention. Respiratory: Clear to auscultation bilaterally, normal respiratory effort  Cardiovascular: Regular rate rhythm, normal S1-S2  Abdomen: Soft, nontender, nondistended  Musculoskeletal: No clubbing, cyanosis, no bilateral lower extremity edema. Brisk capillary refill. Skin:  No rashes  on visible skin  Neurologic: awake, alert and following commands     ASSESSMENT:  - Abdominal pain  - elevated lactic acid  - Seizure like activity   - anxiety and depression   - Hypokalemia    PLAN:  Possible IBS, gastritis vs gastroenteritis. CT abdomen and pelvis negative. Continue to monitor    Replace potassium and monitor    No active seizures observed on video review of jerking movement per neurology      DISPOSITION: Anticipate discharge home in 48 hours.     Medications:  REVIEWED DAILY    Infusion Medications    sodium chloride       Scheduled Medications    potassium bicarb-citric acid  40 mEq Oral BID    sodium chloride flush  10 mL IntraVENous 2 times per day    enoxaparin  40 mg SubCUTAneous Daily     PRN Meds: promethazine, sodium chloride flush, sodium chloride flush, sodium chloride, ondansetron **OR** ondansetron, magnesium hydroxide, acetaminophen **OR** acetaminophen    Labs:     Recent Labs     01/25/23  1238 01/26/23 0318   WBC 6.6 4.6   HGB 12.5 10.1*   HCT 36.6 30.7*    220       Recent Labs     01/25/23  1238 01/26/23 0318    137   K 3.9 3.2*    104   CO2 19* 22   BUN 11 8   CREATININE 0.8 0.8   CALCIUM 10.0 8.2*       Recent Labs     01/25/23  1238 01/26/23 0318   PROT 8.8* 6.5   ALKPHOS 88 66   ALT 14 9   AST 18 13   BILITOT 0.6 0.5   LIPASE 15  --        No results for input(s): INR in the last 72 hours. No results for input(s): [place] in the last 72 hours. Chronic labs:    Lab Results   Component Value Date    CHOL 212 (H) 06/20/2022    TRIG 63 06/20/2022    HDL 49 06/20/2022    LDLCALC 150 (H) 06/20/2022    TSH 0.267 (L) 01/25/2023       Radiology: REVIEWED DAILY    +++++++++++++++++++++++++++++++++++++++++++++++++  Deedee Solis MD  [address]  +++++++++++++++++++++++++++++++++++++++++++++++++  NOTE: This report was transcribed using voice recognition software. Every effort was made to ensure accuracy; however, inadvertent computerized transcription errors may be present.

## 2023-01-27 LAB
ALBUMIN SERPL-MCNC: 3.9 G/DL (ref 3.5–5.2)
ALP BLD-CCNC: 71 U/L (ref 35–104)
ALT SERPL-CCNC: 11 U/L (ref 0–32)
ANION GAP SERPL CALCULATED.3IONS-SCNC: 11 MMOL/L (ref 7–16)
AST SERPL-CCNC: 15 U/L (ref 0–31)
BASOPHILS ABSOLUTE: 0.02 E9/L (ref 0–0.2)
BASOPHILS RELATIVE PERCENT: 0.4 % (ref 0–2)
BILIRUB SERPL-MCNC: 0.2 MG/DL (ref 0–1.2)
BUN BLDV-MCNC: 10 MG/DL (ref 6–20)
CALCIUM SERPL-MCNC: 8.7 MG/DL (ref 8.6–10.2)
CHLORIDE BLD-SCNC: 104 MMOL/L (ref 98–107)
CO2: 23 MMOL/L (ref 22–29)
CREAT SERPL-MCNC: 0.8 MG/DL (ref 0.5–1)
EOSINOPHILS ABSOLUTE: 0.04 E9/L (ref 0.05–0.5)
EOSINOPHILS RELATIVE PERCENT: 0.9 % (ref 0–6)
GFR SERPL CREATININE-BSD FRML MDRD: >60 ML/MIN/1.73
GLUCOSE BLD-MCNC: 82 MG/DL (ref 74–99)
HCT VFR BLD CALC: 30 % (ref 34–48)
HEMOGLOBIN: 9.7 G/DL (ref 11.5–15.5)
IMMATURE GRANULOCYTES #: 0.01 E9/L
IMMATURE GRANULOCYTES %: 0.2 % (ref 0–5)
LYMPHOCYTES ABSOLUTE: 2.68 E9/L (ref 1.5–4)
LYMPHOCYTES RELATIVE PERCENT: 58 % (ref 20–42)
MCH RBC QN AUTO: 28.1 PG (ref 26–35)
MCHC RBC AUTO-ENTMCNC: 32.3 % (ref 32–34.5)
MCV RBC AUTO: 87 FL (ref 80–99.9)
MONOCYTES ABSOLUTE: 0.45 E9/L (ref 0.1–0.95)
MONOCYTES RELATIVE PERCENT: 9.7 % (ref 2–12)
NEUTROPHILS ABSOLUTE: 1.42 E9/L (ref 1.8–7.3)
NEUTROPHILS RELATIVE PERCENT: 30.8 % (ref 43–80)
PDW BLD-RTO: 14.6 FL (ref 11.5–15)
PLATELET # BLD: 235 E9/L (ref 130–450)
PMV BLD AUTO: 9.1 FL (ref 7–12)
POTASSIUM REFLEX MAGNESIUM: 3.7 MMOL/L (ref 3.5–5)
RBC # BLD: 3.45 E12/L (ref 3.5–5.5)
SODIUM BLD-SCNC: 138 MMOL/L (ref 132–146)
TOTAL PROTEIN: 6.9 G/DL (ref 6.4–8.3)
WBC # BLD: 4.6 E9/L (ref 4.5–11.5)

## 2023-01-27 PROCEDURE — 97535 SELF CARE MNGMENT TRAINING: CPT

## 2023-01-27 PROCEDURE — 1200000000 HC SEMI PRIVATE

## 2023-01-27 PROCEDURE — 99232 SBSQ HOSP IP/OBS MODERATE 35: CPT

## 2023-01-27 PROCEDURE — 97530 THERAPEUTIC ACTIVITIES: CPT

## 2023-01-27 PROCEDURE — 97165 OT EVAL LOW COMPLEX 30 MIN: CPT

## 2023-01-27 PROCEDURE — 97161 PT EVAL LOW COMPLEX 20 MIN: CPT

## 2023-01-27 PROCEDURE — 6360000002 HC RX W HCPCS: Performed by: INTERNAL MEDICINE

## 2023-01-27 PROCEDURE — 6370000000 HC RX 637 (ALT 250 FOR IP): Performed by: INTERNAL MEDICINE

## 2023-01-27 PROCEDURE — 80053 COMPREHEN METABOLIC PANEL: CPT

## 2023-01-27 PROCEDURE — 36415 COLL VENOUS BLD VENIPUNCTURE: CPT

## 2023-01-27 PROCEDURE — 2580000003 HC RX 258: Performed by: INTERNAL MEDICINE

## 2023-01-27 PROCEDURE — 85025 COMPLETE CBC W/AUTO DIFF WBC: CPT

## 2023-01-27 RX ADMIN — ONDANSETRON 4 MG: 2 INJECTION INTRAMUSCULAR; INTRAVENOUS at 08:24

## 2023-01-27 RX ADMIN — ONDANSETRON 4 MG: 4 TABLET, ORALLY DISINTEGRATING ORAL at 17:26

## 2023-01-27 RX ADMIN — SODIUM CHLORIDE, PRESERVATIVE FREE 10 ML: 5 INJECTION INTRAVENOUS at 08:24

## 2023-01-27 RX ADMIN — ENOXAPARIN SODIUM 40 MG: 100 INJECTION SUBCUTANEOUS at 08:24

## 2023-01-27 RX ADMIN — ACETAMINOPHEN 650 MG: 325 TABLET ORAL at 12:57

## 2023-01-27 RX ADMIN — ACETAMINOPHEN 650 MG: 325 TABLET ORAL at 21:47

## 2023-01-27 RX ADMIN — SODIUM CHLORIDE, PRESERVATIVE FREE 10 ML: 5 INJECTION INTRAVENOUS at 20:08

## 2023-01-27 ASSESSMENT — PAIN SCALES - GENERAL
PAINLEVEL_OUTOF10: 0
PAINLEVEL_OUTOF10: 8

## 2023-01-27 ASSESSMENT — PAIN DESCRIPTION - LOCATION: LOCATION: HEAD

## 2023-01-27 NOTE — PROCEDURES
[address] Report    MRN: [id number]   PATIENT NAME: Komal Grimm   DATE OF REPORT: 2023  DATE OF SERVICE: 2023 - 2023    PHYSICIAN NAME: José Miguel Nichols DO  Referring Physician: José Miguel Nichols DO       Patient's : 1980   Patient's Age: 43 y.o. Gender: female     PROCEDURE: Routine EEG with video      Clinical Interpretation: This was a normal one day video EEG study. Two clinical events were noted which were characterized by side to side shaking, pelvic thrusting, and non verbal vocalizations. No epileptiform activity was associated with either event. On video review these events appear non-epileptic, likely functional in nature. No epileptic seizures or epileptiform discharges were noted during this study. ____________________________  Electronically signed by: José Miguel Nichols DO, 2023 8:19 AM      Patient Clinical Information   Reason for Study: Patient undergoing evaluation for spells  Patient State: Awake  Primary neurological diagnosis: Spells of uncertain etiology   Primary indication for monitoring: Characterization of spells    Pertinent Medications and Treatments  None    Sedatives administered: No  Intubated: No  Pharmacological paralytic: No    Reporting Period  Start of Study: , 2023  End of Study:  750, 2023      EEG Description  Digital video and scalp EEG monitoring was performed using the standard protocol for this laboratory. Scalp electrodes were applied in the international 10/20 system. Multiple digital montage arrangements were utilized for evaluation. EKG and video were recorded.      Background:      Occipital rhythm (posterior dominant rhythm or PDR): Present   Frequency: 9.5 Hz  Voltage: Medium   Organization: good   Reactivity to eye opening/closure: good    Drowsiness: Present - normal  Sleep: Stage 2 present - normal      Technical and Activation Procedures:  Hyperventilation: Done for 1 minute - no significant build up noted        Photic stimulation: Done - physiologic driving noted        Reactivity to stimulation: Yes    Abnormalities:    I. Seizures? No    One clinical event marked at 1734. This was characterized by side to side shaking of the body, moaning vocalizations, and the patient was noted to reach with her right arm to press the nursing call button during the beginning of the event. A second event was noted at 2234 associated with pelvic thrusting lasting approximately one minute. No electrographic changes were noted with either of these events other than muscle and movement artifact. II. Rhythmic or Periodic Patterns? No    III. Other Abnormalities?         No

## 2023-01-27 NOTE — PROGRESS NOTES
Physical Therapy  Physical Therapy Initial Assessment     Name: Alondra Santamaria  : 1980  MRN: [id number]      Date of Service: 2023    Evaluating PT:  Grecia Mike PT, DPT, [id number]    Room #:  3300/8899-L  Diagnosis:  Abdominal pain, unspecified abdominal location [R10.9]  AMS (altered mental status) [R41.82]  PMHx/PSHx:    Past Medical History:   Diagnosis Date    Anxiety and depression     Breast disorder     Cancer [facility])     Cervical    Cervical cancer ([facility]     pt doesn't remember when diagnosed, pt states she has been ca free for more than 5 years    Depression     Eclampsia 2011    Herpes simplex virus (HSV) infection     Hypothyroid     Mental disorder     Seizures ([facility]     Thyroid disease       Past Surgical History:   Procedure Laterality Date    BREAST LUMPECTOMY Bilateral     CERVIX SURGERY      DILATION AND CURETTAGE OF UTERUS      INNER EAR SURGERY Left     LEG SURGERY Right       Procedure/Surgery:  none this admission  Precautions:  Fall risk, seizure precautions, bed alarm, Purewick  Equipment Needs:  TBD    SUBJECTIVE:    Pt lives with son in a 1 story home with 3 stairs to enter and 1 rail. Bed is on 1 floor and bath is on 1 floor. Pt ambulated with [employer] PTA. OBJECTIVE:   Initial Evaluation  Date: 23 Treatment Short Term/ Long Term   Goals   AM-PAC 6 Clicks 49/45     Was pt agreeable to Eval/treatment? yes     Does pt have pain? No c/o pain     Bed Mobility  Rolling: Armando  Supine to sit: ModA  Sit to supine: ModA  Scooting: Armando  Rolling: Independent   Supine to sit:  Independent   Sit to supine: Independent   Scooting: Independent    Transfers Sit to stand: ModA (partial stand)  Stand to sit: ModA (partial stand)  Stand pivot: NT  Sit to stand: Armando  Stand to sit: Armando  Stand pivot: Armando with AD   Ambulation   NT  >150 feet with AD Armando   Stair negotiation: ascended and descended NT  3 steps with 1 rail Armando   ROM BUE:  refer to OT  BLE:  WFL     Strength BUE:  refer to OT  BLE:  WFL     Balance Sitting EOB:  Armando  Dynamic Standing:  ModA with [employer]  Sitting EOB:  Independent   Dynamic Standing:  Armando with AD     Pt is A & O x 2  Sensation:  Pt denies numbness and tingling to extremities  Edema:  unremarkable    Patient education  Pt educated on role of PT and safe functional mobility    Patient response to education:   Pt verbalized understanding Pt demonstrated skill Pt requires further education in this area   x x x     ASSESSMENT:    Conditions Requiring Skilled Therapeutic Intervention:    [x]Decreased strength     [x]Decreased ROM  [x]Decreased functional mobility  [x]Decreased balance   [x]Decreased endurance   [x]Decreased posture  []Decreased sensation  []Decreased coordination   []Decreased vision  []Decreased safety awareness   []Increased pain       Comments:  Medically cleared by RN for session. Pt was supine in bed upon PT entry and agreeable to participate. Pt began to have seizure like activity during bed mobility, RN notified with call bell. Safety maintained. Pt was incontinent of urine post seizure like activity. Pt given prolonged break to recover. Pt completed log roll R/L for joy care and bedding change. Pt completed transfer from supine<>sit with assistance for trunk and BLEs. Pt was able to maintain fair sitting balance at EOB. Pt cued to sit up tall. Pt requested to attempt to walk to bathroom. Pt completed sit<>stand transfer at EOB with lift assist and verbal cues for hand/foot placement. Once standing pt demonstrated forward flexed posture. Pt was incontinent of urine while standing. Pt assisted back to bed for tye pad change and hygiene. Pt was positioned in supine with all needs met at conclusion of session, call light in reach. RN notified. Pt would benefit from continued PT intervention upon d/c to improve functional mobility, improve quality of life, and decrease fall risk.     Treatment:  Patient practiced and was instructed in the following treatment:    Bed mobility training - pt given verbal and tactile cues to facilitate proper sequencing and safety during rolling and supine>sit as well as provided with physical assistance to complete task   Sitting EOB for >8 minutes for upright tolerance, postural awareness and BLE ROM  Transfer training - pt was given verbal and tactile cues to facilitate proper hand placement, technique and safety during sit to stand and stand to sit as well as provided with physical assistance. Skilled positioning - Pt placed in the supine position with pillows utilized to facilitate upright posture, joint and skin integrity, and interaction with environment. Pt's/ family goals   1. Not stated    Prognosis is good for reaching above PT goals. Patient and or family understand(s) diagnosis, prognosis, and plan of care.   yes    PHYSICAL THERAPY PLAN OF CARE:    PT POC is established based on physician order and patient diagnosis     Referring provider/PT Order:    01/25/23 2045  PT eval and treat  Start:  01/25/23 2045,   End:  01/25/23 2045,   ONE TIME,   Standing Count:  1 Occurrences,   Jennell Kayser, MD     Diagnosis:  Abdominal pain, unspecified abdominal location [R10.9]  AMS (altered mental status) [R41.82]  Specific instructions for next treatment:  improve tolerance to activity    Current Treatment Recommendations:     [x] Strengthening to improve independence with functional mobility   [x] ROM to improve independence with functional mobility   [x] Balance Training to improve static/dynamic balance and to reduce fall risk  [x] Endurance Training to improve activity tolerance during functional mobility   [x] Transfer Training to improve safety and independence with all functional transfers   [x] Gait Training to improve gait mechanics, endurance and assess need for appropriate assistive device  [x] Stair Training in preparation for safe discharge home and/or into the community   [x] Positioning to prevent skin breakdown and contractures  [x] Safety and Education Training   [x] Patient/Caregiver Education   [] HEP  [x] Other     PT long term treatment goals are located in above grid    Frequency of treatments: 2-5x/week x 1-2 weeks. Time in  1027  Time out  1057    Total Treatment Time  15 minutes     Evaluation Time includes thorough review of current medical information, gathering information on past medical history/social history and prior level of function, completion of standardized testing/informal observation of tasks, assessment of data and education on plan of care and goals.     CPT codes:  [x] Low Complexity PT evaluation 20427  [] Moderate Complexity PT evaluation 03596  [] High Complexity PT evaluation 19000  [] PT Re-evaluation 09725  [] Gait training 87663 0 minutes  [] Manual therapy 28762 0 minutes  [x] Therapeutic activities 97858 15 minutes  [] Therapeutic exercises 49070 0 minutes  [] Neuromuscular reeducation 22376 0 minutes     Neli Wheeler PT, DPT  [id number]

## 2023-01-27 NOTE — PROGRESS NOTES
Carlos Castle is a 43 y.o. right handed female     Neurology following for seizures    PMH of anxiety, depression, cervical cancer, hypothyroid, and seizures      Assessment:     Functional nonepileptic spells  Patient states that she began throwing up the day prior to admission. She began having tingling in both of her arms which spread all over and then lost consciousness for about 10 minutes. No tongue bite, +urinary incontinence. Video recording of event as well as video-EEG monitoring have captured spells consisting of arrhythmic, asynchronous shaking of all of her limbs with associated unresponsiveness. Plan:     Avoid benzodiazepines  Supportive care  Needs outpatient referral for behavioral therapy  Neurology will sign off call if needed  Seizure safety precautions for 6 months include : no driving, no tub baths, no swimming alone, no cooking or going near open flame    Subjective:     Patient presented to the ER on 1/25/2023 after experiencing abdominal pain and vomiting. While in the ER she reported feeling tingling which was followed by seizure-like activity. She experienced 3 seizures while in the ER, was placed on seizure precautions, and admitted to telemetry    Her EEG captured spells however these were consistent with an epileptic events. Patient lying in bed sleeping with friends at the bedside. She is drowsy but awakens after conversation. She is able to follow all of my commands. I did explain to her EEG results and her needing a referral for behavioral therapy. She says \"you think I am crazy\". I did explain I did not think she is crazy ,she just needs to find the source of what is causing these spells.       No chest pain or palpitations  No coughing or wheezing    No vertigo, lightheadedness or loss of consciousness  No falls, tripping or stumbling  No incontinence of bowels or bladder  No itching or bruising appreciated  No numbness, tingling or focal arm/leg weakness    ROS otherwise negative      Objective:       /78   Pulse 78   Temp 97.5 °F (36.4 °C) (Temporal)   Resp 18   Ht 5' 11\" (1.803 m)   Wt 193 lb 12.6 oz (87.9 kg)   SpO2 100%   BMI 27.03 kg/m²       General appearance: alert, appears stated age, cooperative and no distress  Head: normocephalic, without obvious abnormality, atraumatic  Eyes: conjunctivae/corneas clear  Neck: no adenopathy,supple, symmetrical, trachea midline and thyroid not enlarged, symmetric, no tenderness/mass/nodules  Lungs: Regular respirations on nasal cannula  Heart: No chest pain or palpitations  Abdomen: soft, non-tender; bowel sounds normal; no masses,  no organomegaly  Extremities:  normal, atraumatic, no cyanosis or edema  Pulses: 2+ and symmetric  Skin: color, texture, turgor normal---no rashes or lesions      Mental Status: Alert, oriented, thought content appropriate, alertness: alert, orientation: time, date, person, place     Appropriate attention/concentration  Intact memories      Speech: Clear  Language: No aphasias    Cranial Nerves:  I: smell NA   II: visual acuity  NA   II: visual fields Full to confrontation   II: pupils CINTIA   III,VII: ptosis None   III,IV,VI: extraocular muscles  Full ROM   V: mastication Normal   V: facial light touch sensation  Normal   V,VII: corneal reflex  Present   VII: facial muscle function - upper  Normal   VII: facial muscle function - lower Normal   VIII: hearing Normal   IX: soft palate elevation  Normal   IX,X: gag reflex Present   XI: trapezius strength  5/5   XI: sternocleidomastoid strength 5/5   XI: neck extension strength  5/5   XII: tongue strength  Normal     Motor:  5/5 throughout  Normal bulk and tone  No drift   No abnormal movements    Sensory:  LT and PP normal  Vibration normal    Coordination:   FN, FFM and ALYSON normal  HS normal    Gait:  Normal      DTR:   2+ throughout    No Babinskis  No Hoang's    No other pathological reflexes    Laboratory/Radiology:     CBC with Differential:    Lab Results   Component Value Date/Time    WBC 4.6 01/27/2023 09:07 AM    RBC 3.45 01/27/2023 09:07 AM    HGB 9.7 01/27/2023 09:07 AM    HCT 30.0 01/27/2023 09:07 AM     01/27/2023 09:07 AM    MCV 87.0 01/27/2023 09:07 AM    MCH 28.1 01/27/2023 09:07 AM    MCHC 32.3 01/27/2023 09:07 AM    RDW 14.6 01/27/2023 09:07 AM    LYMPHOPCT 58.0 01/27/2023 09:07 AM    MONOPCT 9.7 01/27/2023 09:07 AM    BASOPCT 0.4 01/27/2023 09:07 AM    MONOSABS 0.45 01/27/2023 09:07 AM    LYMPHSABS 2.68 01/27/2023 09:07 AM    EOSABS 0.04 01/27/2023 09:07 AM    BASOSABS 0.02 01/27/2023 09:07 AM     CMP:    Lab Results   Component Value Date/Time     01/27/2023 09:07 AM    K 3.7 01/27/2023 09:07 AM     01/27/2023 09:07 AM    CO2 23 01/27/2023 09:07 AM    BUN 10 01/27/2023 09:07 AM    CREATININE 0.8 01/27/2023 09:07 AM    GFRAA >60 06/20/2022 12:00 PM    LABGLOM >60 01/27/2023 09:07 AM    GLUCOSE 82 01/27/2023 09:07 AM    PROT 6.9 01/27/2023 09:07 AM    LABALBU 3.9 01/27/2023 09:07 AM    CALCIUM 8.7 01/27/2023 09:07 AM    BILITOT 0.2 01/27/2023 09:07 AM    ALKPHOS 71 01/27/2023 09:07 AM    AST 15 01/27/2023 09:07 AM    ALT 11 01/27/2023 09:07 AM     TSH:    Lab Results   Component Value Date/Time    TSH 0.267 01/25/2023 09:54 PM     CT head  Negative for acute abnormalities    EEG  EEG was reviewed from 1507 through 021 821 37 16, 1/26/2023      Normal background noted during this period. One clinical event marked at 1. This was characterized by side to side shaking of the body, moaning vocalizations, and the patient was noted to reach with her right arm to press the nursing call button during the beginning of the event. No seizure activity was noted during this event. On video review this appears consistent with a non-epileptic event.         All labs and imaging studies reviewed independently today     CARYL Melgar CNP  2:57 PM  1/27/2023

## 2023-01-27 NOTE — PROGRESS NOTES
Hospitalist Progress Note      SYNOPSIS: Patient admitted on 2023 for AMS (altered mental status)      SUBJECTIVE:    Patient seen and examined. Has a flat affect. Records reviewed. Temp (24hrs), Av.6 °F (36.4 °C), Min:97.5 °F (36.4 °C), Max:97.7 °F (36.5 °C)    DIET: ADULT DIET; Regular  CODE: Full Code    Intake/Output Summary (Last 24 hours) at 2023 1836  Last data filed at 2023 0756  Gross per 24 hour   Intake 240 ml   Output 900 ml   Net -660 ml         OBJECTIVE:    /78   Pulse 78   Temp 97.5 °F (36.4 °C) (Temporal)   Resp 18   Ht 5' 11\" (1.803 m)   Wt 193 lb 12.6 oz (87.9 kg)   SpO2 100%   BMI 27.03 kg/m²     General appearance: No apparent distress, appears stated age and cooperative. HEENT:  Conjunctivae/corneas clear. Neck: Supple. No jugular venous distention. Respiratory: Clear to auscultation bilaterally, normal respiratory effort  Cardiovascular: Regular rate rhythm, normal S1-S2  Abdomen: Soft, nontender, nondistended  Musculoskeletal: No clubbing, cyanosis, no bilateral lower extremity edema. Brisk capillary refill. Skin:  No rashes  on visible skin  Neurologic: awake, alert and following commands     ASSESSMENT:  - Abdominal pain- resolved  - elevated lactic acid  - Seizure-like activity   - anxiety and depression   - Hypokalemia    PLAN:  CT abdomen and pelvis negative. Abdominal pain resolved    Neurology following for suspected seizure-like activity. No active seizures observed as of today. Awaiting neurology for discharge recommendations     Replaced potassium and monitor      DISPOSITION: Anticipate discharge home in 48 hours.     Medications:  REVIEWED DAILY    Infusion Medications    sodium chloride       Scheduled Medications    sodium chloride flush  10 mL IntraVENous 2 times per day    enoxaparin  40 mg SubCUTAneous Daily     PRN Meds: promethazine, sodium chloride flush, sodium chloride flush, sodium chloride, ondansetron **OR** ondansetron, magnesium hydroxide, acetaminophen **OR** acetaminophen    Labs:     Recent Labs     01/25/23  1238 01/26/23  0318 01/27/23  0907   WBC 6.6 4.6 4.6   HGB 12.5 10.1* 9.7*   HCT 36.6 30.7* 30.0*    220 235         Recent Labs     01/25/23  1238 01/26/23  0318 01/27/23  0907    137 138   K 3.9 3.2* 3.7    104 104   CO2 19* 22 23   BUN 11 8 10   CREATININE 0.8 0.8 0.8   CALCIUM 10.0 8.2* 8.7         Recent Labs     01/25/23  1238 01/26/23  0318 01/27/23  0907   PROT 8.8* 6.5 6.9   ALKPHOS 88 66 71   ALT 14 9 11   AST 18 13 15   BILITOT 0.6 0.5 0.2   LIPASE 15  --   --          No results for input(s): INR in the last 72 hours. No results for input(s): Kane Lynch in the last 72 hours. Chronic labs:    Lab Results   Component Value Date    CHOL 212 (H) 06/20/2022    TRIG 63 06/20/2022    HDL 49 06/20/2022    LDLCALC 150 (H) 06/20/2022    TSH 0.267 (L) 01/25/2023       Radiology: REVIEWED DAILY    +++++++++++++++++++++++++++++++++++++++++++++++++  Candida Habermann, MD  [facility] [address] [place] [address]  +++++++++++++++++++++++++++++++++++++++++++++++++  NOTE: This report was transcribed using voice recognition software. Every effort was made to ensure accuracy; however, inadvertent computerized transcription errors may be present.

## 2023-01-27 NOTE — PROGRESS NOTES
Called number on eeg machine to see if they still had a visual on patient or if she was unhooked in any way. Was on a hold for 15 minutes, so I left a message for them to call the floor back. Day shift nurse was made aware.

## 2023-01-27 NOTE — CARE COORDINATION
Per internal med  note from yesterday, Possible IBS, gastritis vs gastroenteritis. CT abdomen and pelvis negative. Continue to monitor. Replace potassium and monitor. No active seizures observed on video review of jerking movement per neurology. DISPOSITION: Anticipate discharge home in 48 hours.  María Lu RN   [phone]

## 2023-01-27 NOTE — PROGRESS NOTES
Occupational Therapy  OCCUPATIONAL THERAPY INITIAL EVALUATION     Perlita Eric [employer] [address] [address]      Date:2023                                                Patient Name: Queen Zackery  MRN: [id number]  : 1980  Room: 8[id number]    Referring Provider: Shaniqua Hamilton MD  Specific Provider Orders/Date: OT eval and treat 23    Diagnosis: Abdominal pain, unspecified abdominal location [R10.9]  AMS (altered mental status) [R41.82]   Pt admitted to hospital on 23 for abdominal pain, n/v    Surgery / Procedure: continuous EEG on      Pertinent Medical History:  has a past medical history of Anxiety and depression, Breast disorder, Cancer ([facility] [address] Cervical cancer ([facility] [address] Depression, Eclampsia, Herpes simplex virus (HSV) infection, Hypothyroid, Mental disorder, Seizures ([facility] [address] and Thyroid disease.        Precautions:  Fall Risk, seizures (see treatment below), Purewick, bed alarm    Assessment of current deficits    [x] Functional mobility  [x]ADLs  [x] Strength               []Cognition    [x] Functional transfers   [x] IADLs         [x] Safety Awareness   [x]Endurance    [] Fine Coordination              [x] Balance      [] Vision/perception   []Sensation     []Gross Motor Coordination  [] ROM  [] Delirium                   [] Motor Control     OT PLAN OF CARE   OT POC based on physician orders, patient diagnosis and results of clinical assessment    Frequency/Duration 1-3 days/wk for 2 weeks PRN   Specific OT Treatment Interventions to include:   * Instruction/training on adapted ADL techniques and AE recommendations to increase functional independence within precautions       * Training on energy conservation strategies, correct breathing pattern and techniques to improve independence/tolerance for self-care routine  * Functional transfer/mobility training/DME recommendations for increased independence, safety, and fall prevention  * Patient/Family education to increase follow through with safety techniques and functional independence  * Recommendation of environmental modifications for increased safety with functional transfers/mobility and ADLs  * Cognitive retraining/development of therapeutic activities to improve problem solving, judgement, memory, and attention for increased safety/participation in ADL/IADL tasks  * Therapeutic exercise to improve motor endurance, ROM, and functional strength for ADLs/functional transfers  * Therapeutic activities to facilitate/challenge dynamic balance, stand tolerance for increased safety and independence with ADLs  * Therapeutic activities to facilitate gross/fine motor skills for increased independence with ADLs      Recommended Adaptive Equipment: TBD     Home Living: Pt lives with son in 1 floor home. Equipment owned: w/w    Prior Level of Function: independent with ADLs , independent with IADLs; ambulated independently w/ w/w   Driving: yes    Pain Level: Pt c/o no pain this session    Cognition: A&O: 2/4; Follows 1 step directions inconsistently  Flat affect, limited verbal communication   Memory:  fair    Sequencing:  fair    Problem solving:  poor   Judgement/safety:  poor     Functional Assessment:  AM-PAC Daily Activity Raw Score: 11/24   Initial Eval Status  Date: 1/27/23 Treatment Status  Date: STGs = LTGs  Time frame: 10-14 days   Feeding Minimal Assist   Modified [place]    Grooming Moderate Assist   Supervision    UB Dressing Moderate Assist   Supervision    LB Dressing Dependent   Minimal Assist    Bathing Maximal Assist  Minimal Assist    Toileting Dependent   Incontinent of urine 2x   Increased assist for hygiene tasks  Minimal Assist    Bed Mobility  Supine to sit: Moderate Assist   Sit to supine: Maximal Assist   Supine to sit: Stand by Assist   Sit to supine: Stand by Assist    Functional Transfers Sit>stand:  Moderate Assist   Stand>sit: Max A (d/t poor safety)  Minimal Assist    Functional Mobility NT  Minimal Assist    Balance Sitting:     Static: Min A    Dynamic:Mod A  Standing: Mod>Max A w/ w/w     Activity Tolerance Fair-  See treatment below  Also limited by extreme fatigue w/ very minimal activity. Frequent, extensive rest breaks facilitated  Good   Visual/  Perceptual Glasses: no                  Hand Dominance R   AROM (PROM) Strength Additional Info:    RUE  WFL Grossly: 4/5 good  and wfl FMC/dexterity noted during ADL tasks       LUE WFL Grossly: 4/5 good  and wfl FMC/dexterity noted during ADL tasks       Hearing: WFL   Sensation:  No c/o numbness or tingling  Tone: WFL   Edema: none noted    Comments: Upon arrival patient lying in bed.  Therapist educated pt on role of OT. RN clearance. At end of session, patient lying in bed (bed alarm on, friend present) with call light and phone within reach, all lines and tubes intact.  Overall patient demonstrated decreased independence and safety during completion of ADL/functional transfer/mobility tasks.  Pt would benefit from continued skilled OT to increase safety and independence with completion of ADL/IADL tasks for functional independence and quality of life.    Treatment: Facilitation of bed mobility (education/cues for body mechanics, sequencing and safety) and unsupported sitting balance (addressing posture (flexed posture), weight shifting, forward gaze and safety to prep for ADL's).  Therapist facilitated self-care retraining: UB self-care tasks and grooming task (seated EOB) while educating/cuing pt on modified techniques, posture, safety and energy conservation techniques.    Facilitated functional transfers (w/ education/cues for safety/hand placement), standing tolerance tasks (addressing posture, balance, safety and activity tolerance impacting ADLs and functional activity. Very limited stand tolerance d/t urinary incontinence).  Incontinent of urine  upon standing - increased assist for hygiene task (completed once assisted to supine position). Facilitated bed repositioning for comfort following rolling/toileting task. **Seizure like activity noted during bed mobility. Call bell rang. RN then present. Safety maintained. Pt incontinent post seizure like activity. JD=[id number] during witnessed activity. Monitored until returned to [facility]. Facilitated bed rolling L/R for hygiene once activity subsided/post extensive rest break. No further seizure like activity noted while present in room. Rehab Potential: Good for established goals     Patient / Family Goal: not stated      Patient and/or family were instructed on functional diagnosis, prognosis/goals and OT plan of care. Demonstrated fair understanding. Eval Complexity: Low    Time In: 10:27  Time Out: 10:56  Total Treatment Time: 13 minutes    Min Units   OT Eval Low 97165  X  1   OT Eval Medium 06860      OT Eval High 46912      OT Re-Eval [id number]       Therapeutic Ex 11273       Therapeutic Activities 77448  5 0    ADL/Self Care 57163  8  1   Orthotic Management 19822       Manual 48450     Neuro Re-Ed 95331       Non-Billable Time          Evaluation Time additionally includes thorough review of current medical information, gathering information on past medical history/social history and prior level of function, interpretation of standardized testing/informal observation of tasks, assessment of data and development of plan of care and goals.         ANABELL SalcedoR/L #[id number]

## 2023-01-27 NOTE — PROGRESS NOTES
Notified Dr. Guanaco Mora of patients panic attack - experiencing chest tightness/SOB. All VS normal. Placed patient on 1L O2 for comfort. No new orders. Will continue to monitor.

## 2023-01-28 PROCEDURE — 1200000000 HC SEMI PRIVATE

## 2023-01-28 PROCEDURE — 6360000002 HC RX W HCPCS: Performed by: INTERNAL MEDICINE

## 2023-01-28 PROCEDURE — 2500000003 HC RX 250 WO HCPCS: Performed by: FAMILY MEDICINE

## 2023-01-28 PROCEDURE — 6360000002 HC RX W HCPCS: Performed by: FAMILY MEDICINE

## 2023-01-28 PROCEDURE — 2580000003 HC RX 258: Performed by: INTERNAL MEDICINE

## 2023-01-28 PROCEDURE — 6370000000 HC RX 637 (ALT 250 FOR IP): Performed by: INTERNAL MEDICINE

## 2023-01-28 RX ORDER — CIPROFLOXACIN 2 MG/ML
400 INJECTION, SOLUTION INTRAVENOUS EVERY 12 HOURS
Status: DISCONTINUED | OUTPATIENT
Start: 2023-01-28 | End: 2023-01-30 | Stop reason: HOSPADM

## 2023-01-28 RX ORDER — METRONIDAZOLE 500 MG/100ML
500 INJECTION, SOLUTION INTRAVENOUS EVERY 12 HOURS
Status: DISCONTINUED | OUTPATIENT
Start: 2023-01-28 | End: 2023-01-30 | Stop reason: HOSPADM

## 2023-01-28 RX ORDER — DEXTROSE, SODIUM CHLORIDE, AND POTASSIUM CHLORIDE 5; .9; .15 G/100ML; G/100ML; G/100ML
INJECTION INTRAVENOUS CONTINUOUS
Status: DISCONTINUED | OUTPATIENT
Start: 2023-01-28 | End: 2023-01-30 | Stop reason: HOSPADM

## 2023-01-28 RX ADMIN — SODIUM CHLORIDE, PRESERVATIVE FREE 10 ML: 5 INJECTION INTRAVENOUS at 21:03

## 2023-01-28 RX ADMIN — ACETAMINOPHEN 650 MG: 325 TABLET ORAL at 16:20

## 2023-01-28 RX ADMIN — ONDANSETRON 4 MG: 4 TABLET, ORALLY DISINTEGRATING ORAL at 12:41

## 2023-01-28 RX ADMIN — CIPROFLOXACIN 400 MG: 2 INJECTION, SOLUTION INTRAVENOUS at 20:48

## 2023-01-28 RX ADMIN — METRONIDAZOLE 500 MG: 500 INJECTION, SOLUTION INTRAVENOUS at 20:48

## 2023-01-28 RX ADMIN — ENOXAPARIN SODIUM 40 MG: 100 INJECTION SUBCUTANEOUS at 08:50

## 2023-01-28 RX ADMIN — SODIUM CHLORIDE, PRESERVATIVE FREE 10 ML: 5 INJECTION INTRAVENOUS at 08:53

## 2023-01-28 RX ADMIN — ACETAMINOPHEN 650 MG: 325 TABLET ORAL at 09:02

## 2023-01-28 RX ADMIN — POTASSIUM CHLORIDE, DEXTROSE MONOHYDRATE AND SODIUM CHLORIDE: 150; 5; 900 INJECTION, SOLUTION INTRAVENOUS at 22:19

## 2023-01-28 ASSESSMENT — PAIN DESCRIPTION - DESCRIPTORS
DESCRIPTORS: CRAMPING;DISCOMFORT
DESCRIPTORS: DISCOMFORT
DESCRIPTORS: CRAMPING;DISCOMFORT

## 2023-01-28 ASSESSMENT — PAIN DESCRIPTION - PAIN TYPE
TYPE: ACUTE PAIN

## 2023-01-28 ASSESSMENT — PAIN SCALES - GENERAL
PAINLEVEL_OUTOF10: 0
PAINLEVEL_OUTOF10: 8
PAINLEVEL_OUTOF10: 8
PAINLEVEL_OUTOF10: 0
PAINLEVEL_OUTOF10: 8
PAINLEVEL_OUTOF10: 0

## 2023-01-28 ASSESSMENT — PAIN DESCRIPTION - ORIENTATION: ORIENTATION: LOWER

## 2023-01-28 ASSESSMENT — PAIN DESCRIPTION - LOCATION
LOCATION: ABDOMEN

## 2023-01-28 ASSESSMENT — PAIN DESCRIPTION - FREQUENCY
FREQUENCY: CONTINUOUS
FREQUENCY: CONTINUOUS

## 2023-01-29 LAB
ANION GAP SERPL CALCULATED.3IONS-SCNC: 8 MMOL/L (ref 7–16)
BASOPHILS ABSOLUTE: 0.01 E9/L (ref 0–0.2)
BASOPHILS RELATIVE PERCENT: 0.2 % (ref 0–2)
BUN BLDV-MCNC: 6 MG/DL (ref 6–20)
CALCIUM SERPL-MCNC: 9 MG/DL (ref 8.6–10.2)
CHLORIDE BLD-SCNC: 104 MMOL/L (ref 98–107)
CO2: 26 MMOL/L (ref 22–29)
CREAT SERPL-MCNC: 0.8 MG/DL (ref 0.5–1)
EOSINOPHILS ABSOLUTE: 0.07 E9/L (ref 0.05–0.5)
EOSINOPHILS RELATIVE PERCENT: 1.4 % (ref 0–6)
GFR SERPL CREATININE-BSD FRML MDRD: >60 ML/MIN/1.73
GLUCOSE BLD-MCNC: 85 MG/DL (ref 74–99)
HCT VFR BLD CALC: 28.7 % (ref 34–48)
HEMOGLOBIN: 9.5 G/DL (ref 11.5–15.5)
IMMATURE GRANULOCYTES #: 0.02 E9/L
IMMATURE GRANULOCYTES %: 0.4 % (ref 0–5)
LYMPHOCYTES ABSOLUTE: 2.31 E9/L (ref 1.5–4)
LYMPHOCYTES RELATIVE PERCENT: 45.9 % (ref 20–42)
MCH RBC QN AUTO: 29.2 PG (ref 26–35)
MCHC RBC AUTO-ENTMCNC: 33.1 % (ref 32–34.5)
MCV RBC AUTO: 88.3 FL (ref 80–99.9)
MONOCYTES ABSOLUTE: 0.41 E9/L (ref 0.1–0.95)
MONOCYTES RELATIVE PERCENT: 8.2 % (ref 2–12)
NEUTROPHILS ABSOLUTE: 2.21 E9/L (ref 1.8–7.3)
NEUTROPHILS RELATIVE PERCENT: 43.9 % (ref 43–80)
PDW BLD-RTO: 13.9 FL (ref 11.5–15)
PLATELET # BLD: 274 E9/L (ref 130–450)
PMV BLD AUTO: 9.1 FL (ref 7–12)
POTASSIUM SERPL-SCNC: 4.2 MMOL/L (ref 3.5–5)
RBC # BLD: 3.25 E12/L (ref 3.5–5.5)
SODIUM BLD-SCNC: 138 MMOL/L (ref 132–146)
WBC # BLD: 5 E9/L (ref 4.5–11.5)

## 2023-01-29 PROCEDURE — 6360000002 HC RX W HCPCS: Performed by: INTERNAL MEDICINE

## 2023-01-29 PROCEDURE — 1200000000 HC SEMI PRIVATE

## 2023-01-29 PROCEDURE — 80048 BASIC METABOLIC PNL TOTAL CA: CPT

## 2023-01-29 PROCEDURE — 36415 COLL VENOUS BLD VENIPUNCTURE: CPT

## 2023-01-29 PROCEDURE — 2500000003 HC RX 250 WO HCPCS: Performed by: FAMILY MEDICINE

## 2023-01-29 PROCEDURE — 85025 COMPLETE CBC W/AUTO DIFF WBC: CPT

## 2023-01-29 PROCEDURE — 87088 URINE BACTERIA CULTURE: CPT

## 2023-01-29 PROCEDURE — 6360000002 HC RX W HCPCS: Performed by: FAMILY MEDICINE

## 2023-01-29 RX ADMIN — METRONIDAZOLE 500 MG: 500 INJECTION, SOLUTION INTRAVENOUS at 20:52

## 2023-01-29 RX ADMIN — CIPROFLOXACIN 400 MG: 2 INJECTION, SOLUTION INTRAVENOUS at 20:47

## 2023-01-29 RX ADMIN — CIPROFLOXACIN 400 MG: 2 INJECTION, SOLUTION INTRAVENOUS at 08:25

## 2023-01-29 RX ADMIN — ENOXAPARIN SODIUM 40 MG: 100 INJECTION SUBCUTANEOUS at 08:23

## 2023-01-29 RX ADMIN — METRONIDAZOLE 500 MG: 500 INJECTION, SOLUTION INTRAVENOUS at 08:23

## 2023-01-29 ASSESSMENT — PAIN SCALES - GENERAL
PAINLEVEL_OUTOF10: 0
PAINLEVEL_OUTOF10: 0

## 2023-01-29 NOTE — PROGRESS NOTES
Hospitalist Progress Note      SYNOPSIS: Patient admitted on 2023 for AMS (altered mental status)      SUBJECTIVE:    Patient seen and examined. Has a flat affect. Records reviewed. Temp (24hrs), Av.7 °F (36.5 °C), Min:97.6 °F (36.4 °C), Max:97.8 °F (36.6 °C)    DIET: ADULT DIET; Regular  CODE: Full Code    Intake/Output Summary (Last 24 hours) at 2023 1903  Last data filed at 2023 1445  Gross per 24 hour   Intake 900 ml   Output --   Net 900 ml         OBJECTIVE:    BP 98/69   Pulse 64   Temp 97.8 °F (36.6 °C) (Oral)   Resp 17   Ht 5' 11\" (1.803 m)   Wt 193 lb 6.4 oz (87.7 kg)   SpO2 98%   BMI 26.97 kg/m²     General appearance: No apparent distress, appears stated age and cooperative. HEENT:  Conjunctivae/corneas clear. Neck: Supple. No jugular venous distention. Respiratory: Clear to auscultation bilaterally, normal respiratory effort  Cardiovascular: Regular rate rhythm, normal S1-S2  Abdomen: Soft, nontender, nondistended  Musculoskeletal: No clubbing, cyanosis, no bilateral lower extremity edema. Brisk capillary refill. Skin:  No rashes  on visible skin  Neurologic: awake, alert and following commands     ASSESSMENT:  - Abdominal pain- resolved  - Seizure-like activity  - UTI  - Bacterial Vaginosis   - anxiety and depression   - Hypokalemia    PLAN:  CT abdomen and pelvis negative. Abdominal pain resolved    Neurology following for suspected seizure-like activity. No active seizures observed as of today. Patient reports dysuria, urine analysis is positive for UTI. Will start IV ciprofloxacin. Send urine cultures. Start IV hydration. Patient also has trichomoniasis, will start Flagyl. Replaced potassium and monitor      DISPOSITION: Anticipate discharge home in 48 hours.     Medications:  REVIEWED DAILY    Infusion Medications    dextrose 5% and 0.9% NaCl with KCl 20 mEq      sodium chloride       Scheduled Medications    ciprofloxacin  400 mg IntraVENous Q12H    metroNIDAZOLE  500 mg IntraVENous Q12H    sodium chloride flush  10 mL IntraVENous 2 times per day    enoxaparin  40 mg SubCUTAneous Daily     PRN Meds: promethazine, sodium chloride flush, sodium chloride flush, sodium chloride, ondansetron **OR** ondansetron, magnesium hydroxide, acetaminophen **OR** acetaminophen    Labs:     Recent Labs     01/26/23 0318 01/27/23  0907   WBC 4.6 4.6   HGB 10.1* 9.7*   HCT 30.7* 30.0*    235         Recent Labs     01/26/23 0318 01/27/23  0907    138   K 3.2* 3.7    104   CO2 22 23   BUN 8 10   CREATININE 0.8 0.8   CALCIUM 8.2* 8.7         Recent Labs     01/26/23 0318 01/27/23  0907   PROT 6.5 6.9   ALKPHOS 66 71   ALT 9 11   AST 13 15   BILITOT 0.5 0.2         No results for input(s): INR in the last 72 hours. No results for input(s): Shani Castro in the last 72 hours. Chronic labs:    Lab Results   Component Value Date    CHOL 212 (H) 06/20/2022    TRIG 63 06/20/2022    HDL 49 06/20/2022    LDLCALC 150 (H) 06/20/2022    TSH 0.267 (L) 01/25/2023       Radiology: REVIEWED DAILY    +++++++++++++++++++++++++++++++++++++++++++++++++  Marlon Pablo MD  [facility] [address] [address]  +++++++++++++++++++++++++++++++++++++++++++++++++  NOTE: This report was transcribed using voice recognition software. Every effort was made to ensure accuracy; however, inadvertent computerized transcription errors may be present.

## 2023-01-29 NOTE — PROGRESS NOTES
Hospitalist Progress Note      SYNOPSIS: Patient admitted on 2023 for AMS (altered mental status)      SUBJECTIVE:    Patient seen and examined. Has a flat affect. Records reviewed. Temp (24hrs), Av.5 °F (36.4 °C), Min:97.4 °F (36.3 °C), Max:97.6 °F (36.4 °C)    DIET: ADULT DIET; Regular  CODE: Full Code  No intake or output data in the 24 hours ending 23 1709      OBJECTIVE:    BP 89/61   Pulse 58   Temp 97.4 °F (36.3 °C) (Oral)   Resp 18   Ht 5' 11\" (1.803 m)   Wt 192 lb 10.9 oz (87.4 kg)   SpO2 97%   BMI 26.87 kg/m²     General appearance: No apparent distress, appears stated age and cooperative. HEENT:  Conjunctivae/corneas clear. Neck: Supple. No jugular venous distention. Respiratory: Clear to auscultation bilaterally, normal respiratory effort  Cardiovascular: Regular rate rhythm, normal S1-S2  Abdomen: Soft, nontender, nondistended  Musculoskeletal: No clubbing, cyanosis, no bilateral lower extremity edema. Brisk capillary refill. Skin:  No rashes  on visible skin  Neurologic: awake, alert and following commands     ASSESSMENT:  - Abdominal pain- resolved  - Seizure-like activity  - UTI  - Bacterial Vaginosis   - anxiety and depression   - Hypokalemia    PLAN:  CT abdomen and pelvis negative. Abdominal pain resolved    Neurology following for suspected seizure-like activity. No active seizures observed as of today. Patient reports dysuria, urine analysis is positive for UTI. Continue IV ciprofloxacin. Awaiting urine cultures. Continue IV hydration. Patient also has trichomoniasis, started Flagyl. Replaced potassium and monitor      DISPOSITION: Anticipate discharge home in 48 hours.     Medications:  REVIEWED DAILY    Infusion Medications    dextrose 5% and 0.9% NaCl with KCl 20 mEq 125 mL/hr at 23 8289    sodium chloride       Scheduled Medications    ciprofloxacin  400 mg IntraVENous Q12H    metroNIDAZOLE  500 mg IntraVENous Q12H    sodium chloride flush  10 mL IntraVENous 2 times per day    enoxaparin  40 mg SubCUTAneous Daily     PRN Meds: promethazine, sodium chloride flush, sodium chloride flush, sodium chloride, ondansetron **OR** ondansetron, magnesium hydroxide, acetaminophen **OR** acetaminophen    Labs:     Recent Labs     01/27/23  0907 01/29/23  1120   WBC 4.6 5.0   HGB 9.7* 9.5*   HCT 30.0* 28.7*    274         Recent Labs     01/27/23  0907 01/29/23  1120    138   K 3.7 4.2    104   CO2 23 26   BUN 10 6   CREATININE 0.8 0.8   CALCIUM 8.7 9.0         Recent Labs     01/27/23  0907   PROT 6.9   ALKPHOS 71   ALT 11   AST 15   BILITOT 0.2         No results for input(s): INR in the last 72 hours. No results for input(s): Lattie Juvenal in the last 72 hours. Chronic labs:    Lab Results   Component Value Date    CHOL 212 (H) 06/20/2022    TRIG 63 06/20/2022    HDL 49 06/20/2022    LDLCALC 150 (H) 06/20/2022    TSH 0.267 (L) 01/25/2023       Radiology: REVIEWED DAILY    +++++++++++++++++++++++++++++++++++++++++++++++++  Andrés Mason MD  [address]  +++++++++++++++++++++++++++++++++++++++++++++++++  NOTE: This report was transcribed using voice recognition software. Every effort was made to ensure accuracy; however, inadvertent computerized transcription errors may be present.

## 2023-01-30 VITALS
SYSTOLIC BLOOD PRESSURE: 100 MMHG | TEMPERATURE: 97.9 F | HEIGHT: 71 IN | HEART RATE: 64 BPM | RESPIRATION RATE: 16 BRPM | BODY MASS INDEX: 28.74 KG/M2 | OXYGEN SATURATION: 99 % | DIASTOLIC BLOOD PRESSURE: 71 MMHG | WEIGHT: 205.3 LBS

## 2023-01-30 LAB
ANION GAP SERPL CALCULATED.3IONS-SCNC: 11 MMOL/L (ref 7–16)
ATYPICAL LYMPHOCYTE RELATIVE PERCENT: 0.9 % (ref 0–4)
BASOPHILS ABSOLUTE: 0.05 E9/L (ref 0–0.2)
BASOPHILS RELATIVE PERCENT: 0.9 % (ref 0–2)
BUN BLDV-MCNC: 7 MG/DL (ref 6–20)
CALCIUM SERPL-MCNC: 9.1 MG/DL (ref 8.6–10.2)
CHLORIDE BLD-SCNC: 104 MMOL/L (ref 98–107)
CO2: 26 MMOL/L (ref 22–29)
CREAT SERPL-MCNC: 0.8 MG/DL (ref 0.5–1)
EOSINOPHILS ABSOLUTE: 0.25 E9/L (ref 0.05–0.5)
EOSINOPHILS RELATIVE PERCENT: 4.3 % (ref 0–6)
GFR SERPL CREATININE-BSD FRML MDRD: >60 ML/MIN/1.73
GLUCOSE BLD-MCNC: 85 MG/DL (ref 74–99)
HCT VFR BLD CALC: 32.4 % (ref 34–48)
HEMOGLOBIN: 10.7 G/DL (ref 11.5–15.5)
LYMPHOCYTES ABSOLUTE: 2.96 E9/L (ref 1.5–4)
LYMPHOCYTES RELATIVE PERCENT: 51.3 % (ref 20–42)
MCH RBC QN AUTO: 29.5 PG (ref 26–35)
MCHC RBC AUTO-ENTMCNC: 33 % (ref 32–34.5)
MCV RBC AUTO: 89.3 FL (ref 80–99.9)
MONOCYTES ABSOLUTE: 0.28 E9/L (ref 0.1–0.95)
MONOCYTES RELATIVE PERCENT: 5.2 % (ref 2–12)
NEUTROPHILS ABSOLUTE: 2.11 E9/L (ref 1.8–7.3)
NEUTROPHILS RELATIVE PERCENT: 37.4 % (ref 43–80)
OVALOCYTES: ABNORMAL
PDW BLD-RTO: 14 FL (ref 11.5–15)
PLATELET # BLD: 309 E9/L (ref 130–450)
PMV BLD AUTO: 9.1 FL (ref 7–12)
POIKILOCYTES: ABNORMAL
POTASSIUM SERPL-SCNC: 4.3 MMOL/L (ref 3.5–5)
RBC # BLD: 3.63 E12/L (ref 3.5–5.5)
SODIUM BLD-SCNC: 141 MMOL/L (ref 132–146)
WBC # BLD: 5.7 E9/L (ref 4.5–11.5)

## 2023-01-30 PROCEDURE — 36415 COLL VENOUS BLD VENIPUNCTURE: CPT

## 2023-01-30 PROCEDURE — 6360000002 HC RX W HCPCS: Performed by: INTERNAL MEDICINE

## 2023-01-30 PROCEDURE — 97530 THERAPEUTIC ACTIVITIES: CPT

## 2023-01-30 PROCEDURE — 2500000003 HC RX 250 WO HCPCS: Performed by: FAMILY MEDICINE

## 2023-01-30 PROCEDURE — 6360000002 HC RX W HCPCS: Performed by: FAMILY MEDICINE

## 2023-01-30 PROCEDURE — 97535 SELF CARE MNGMENT TRAINING: CPT

## 2023-01-30 PROCEDURE — 80048 BASIC METABOLIC PNL TOTAL CA: CPT

## 2023-01-30 PROCEDURE — 85025 COMPLETE CBC W/AUTO DIFF WBC: CPT

## 2023-01-30 RX ORDER — CIPROFLOXACIN 500 MG/1
500 TABLET, FILM COATED ORAL 2 TIMES DAILY
Qty: 10 TABLET | Refills: 0 | Status: SHIPPED | OUTPATIENT
Start: 2023-01-30 | End: 2023-02-04

## 2023-01-30 RX ORDER — METRONIDAZOLE 500 MG/1
500 TABLET ORAL 2 TIMES DAILY
Qty: 14 TABLET | Refills: 0 | Status: SHIPPED | OUTPATIENT
Start: 2023-01-30 | End: 2023-02-06

## 2023-01-30 RX ADMIN — METRONIDAZOLE 500 MG: 500 INJECTION, SOLUTION INTRAVENOUS at 08:50

## 2023-01-30 RX ADMIN — ENOXAPARIN SODIUM 40 MG: 100 INJECTION SUBCUTANEOUS at 08:47

## 2023-01-30 RX ADMIN — POTASSIUM CHLORIDE, DEXTROSE MONOHYDRATE AND SODIUM CHLORIDE: 150; 5; 900 INJECTION, SOLUTION INTRAVENOUS at 04:47

## 2023-01-30 RX ADMIN — CIPROFLOXACIN 400 MG: 2 INJECTION, SOLUTION INTRAVENOUS at 08:53

## 2023-01-30 ASSESSMENT — PAIN SCALES - GENERAL: PAINLEVEL_OUTOF10: 0

## 2023-01-30 NOTE — PROGRESS NOTES
Message sent to Dr. Day Faria to notify of patient's daughter concern of patient needing MRI of left knee as she thinks this is why patient scored low with therapy.

## 2023-01-30 NOTE — PROGRESS NOTES
Spoke to Dr. Narendra Alvarado regarding MRI request.  Per Dr. Narendra Alvarado, patient had MRI 4/2022 and can follow up with orthopedic as an outpatient, daughter notified.

## 2023-01-30 NOTE — PROGRESS NOTES
Physical Therapy  Physical Therapy Treatment    Name: Swapnil Marcum  : 1980  MRN: [id number]      Date of Service: 2023    Evaluating PT:  Ashley Ramos, PT, DPT, [id number]    Room #:  2870/2214-K  Diagnosis:  Abdominal pain, unspecified abdominal location [R10.9]  AMS (altered mental status) [R41.82]  PMHx/PSHx:    Past Medical History:   Diagnosis Date    Anxiety and depression     Breast disorder     Cancer [facility])     Cervical    Cervical cancer ([facility]     pt doesn't remember when diagnosed, pt states she has been ca free for more than 5 years    Depression     Eclampsia 2011    Herpes simplex virus (HSV) infection     Hypothyroid     Mental disorder     Seizures ([facility]     Thyroid disease       Past Surgical History:   Procedure Laterality Date    BREAST LUMPECTOMY Bilateral     CERVIX SURGERY      DILATION AND CURETTAGE OF UTERUS      INNER EAR SURGERY Left     LEG SURGERY Right       Procedure/Surgery:  none this admission  Precautions:  Fall risk, seizure precautions, bed alarm, Purewick  Equipment Needs:  TBD    SUBJECTIVE:    Pt lives with son in a 1 story home with 3 stairs to enter and 1 rail. Bed is on 1 floor and bath is on 1 floor. Pt ambulated with [employer] PTA. OBJECTIVE:   Initial Evaluation  Date: 23 Treatment  Date: 23 Short Term/ Long Term   Goals   AM-PAC 6 Clicks     Was pt agreeable to Eval/treatment? yes yes    Does pt have pain? No c/o pain L knee pain    Bed Mobility  Rolling: Armando  Supine to sit: ModA  Sit to supine: ModA  Scooting: Armando Rolling: NT  Supine to sit: Armando  Sit to supine: Armando  Scooting: Armando Rolling: Independent   Supine to sit:  Independent   Sit to supine: Independent   Scooting: Independent    Transfers Sit to stand: ModA (partial stand)  Stand to sit: ModA (partial stand)  Stand pivot: NT Sit to stand: Armando  Stand to sit: Armando  Stand pivot: Armando with [employer] Sit to stand: Armando  Stand to sit: Armando  Stand pivot: Armando with AD   Ambulation   NT 25' x2 with [employer] Armando  Partial to NWB on LLE d/t pain, with increased weight throughout BUE >150 feet with AD Armando   Stair negotiation: ascended and descended NT NT 3 steps with 1 rail Armando   ROM BUE:  refer to OT  BLE:  WFL RLE:  WFL  LLE: limited d/t pain    Strength BUE:  refer to OT  BLE:  WFL RLE: 4-/5  LLE: 3/5    Balance Sitting EOB:  Armando  Dynamic Standing:  ModA with [employer] Sitting EOB:  SBA  Dynamic Standing:  Armando with [employer] Sitting EOB:  Independent   Dynamic Standing:  Armando with AD   Pt is A & O x not formally assessed; able to follow multi step directions  Sensation:  Pt denies numbness and tingling to extremities  Edema:  unremarkable    Patient education  Pt educated on role of PT, safe functional mobility, and use of AD upon return home for increased stability. Patient response to education:   Pt verbalized understanding Pt demonstrated skill Pt requires further education in this area   x x x     ASSESSMENT:    Comments:  Pt was supine in bed upon PT entry and agreeable to participate. Increased time required for functional mobility d/t L knee pain. Pt was able to complete supine<>sit transfer from EOB with slight assist for trunk. Pt was able to maintain fair sitting balance at EOB throughout MMT. Pt completed sit<>stand transfer with verbal cues for hand/foot placement. Pt ambulated short distance to bathroom with no AD, noted decreased balance, antalgic gait pattern, and slowed pace. Pt was able to maintain fair standing balance at sink for hand hygiene for ~1-2 minutes. Pt was given [employer] for further ambulation, noted increased stability, slowed pace, partial to NWB on LLE, and verbal/visual cues for [employer] approximation. Pt was returned to bed and positioned in supine with all needs met at conclusion of session, call light in reach. RN notified. Pt would benefit from continued skilled PT services upon d/c for improved safe/independent functional mobility and decrease fall risk.     Treatment:  Patient practiced and was instructed in the following treatment:    Bed mobility training - pt given verbal and tactile cues to facilitate proper sequencing and safety during rolling and supine>sit as well as provided with physical assistance to complete task   Sitting EOB for >5 minutes for upright tolerance, postural awareness and BLE ROM  Transfer training - pt was given verbal and tactile cues to facilitate proper hand placement, technique and safety during sit to stand and stand to sit as well as provided with physical assistance. Gait training- pt was given verbal and tactile cues to facilitate balance, WW approximation, and safety during ambulation as well as provided with physical assistance. PLAN:    Patient is making fair progress towards established goals. Will continue with current POC.       Time in  1350  Time out  1413    Total Treatment Time  23 minutes     CPT codes:  [] Gait training 50925 0 minutes  [] Manual therapy 56676 0 minutes  [x] Therapeutic activities 36643 23 minutes  [] Therapeutic exercises 77131 0 minutes  [] Neuromuscular reeducation [address] 0 minutes    Samara Borges PT, DPT  [id number]

## 2023-01-30 NOTE — PROGRESS NOTES
CLINICAL PHARMACY NOTE: MEDS TO BEDS    Total # of Prescriptions Filled: 2   The following medications were delivered to the patient:  FLAGYL 500 MG  CIPRO 500 MG    Additional Documentation:   DELIVERED TO PT @ 12:31PM  PT INSTRUCTED TO NOT USE ALCOHOL WHILE TAKING FLAGYL

## 2023-01-30 NOTE — PROGRESS NOTES
[address] [place] [address],[address] [place] [address] [address] [place], [place]      ZAUM:  Patient Name: Huey Schlatter  MRN: [id number]  : 1980  Room: [address] [place] [address]     Per OT Eval:     Evaluating [facility], OTR/L #[id number]     Referring Provider: Ngoc Morse MD  Specific Provider Orders/Date: OT eval and treat 23     Diagnosis: Abdominal pain, unspecified abdominal location [R10.9]  AMS (altered mental status) [R41.82]   Pt admitted to hospital on 23 for abdominal pain, n/v     Surgery / Procedure: continuous EEG on       Pertinent Medical History:  has a past medical history of Anxiety and depression, Breast disorder, Cancer ([facility] Cervical cancer ([facility] Depression, Eclampsia, Herpes simplex virus (HSV) infection, Hypothyroid, Mental disorder, Seizures ([facility] and Thyroid disease.          Precautions:  Fall Risk, seizures (see treatment below), Purewick, bed alarm     Assessment of current deficits    [x] Functional mobility         [x]ADLs           [x] Strength                  []Cognition    [x] Functional transfers       [x] IADLs         [x] Safety Awareness   [x]Endurance    [] Fine Coordination                      [x] Balance      [] Vision/perception   []Sensation      []Gross Motor Coordination          [] ROM           [] Delirium                   [] Motor Control      OT PLAN OF CARE   OT POC based on physician orders, patient diagnosis and results of clinical assessment     Frequency/Duration 1-3 days/wk for 2 weeks PRN   Specific OT Treatment Interventions to include:   * Instruction/training on adapted ADL techniques and AE recommendations to increase functional independence within precautions       * Training on energy conservation strategies, correct breathing pattern and techniques to improve independence/tolerance for self-care routine  * Functional transfer/mobility training/DME recommendations for increased independence, safety, and fall prevention  * Patient/Family education to increase follow through with safety techniques and functional independence  * Recommendation of environmental modifications for increased safety with functional transfers/mobility and ADLs  * Cognitive retraining/development of therapeutic activities to improve problem solving, judgement, memory, and attention for increased safety/participation in ADL/IADL tasks  * Therapeutic exercise to improve motor endurance, ROM, and functional strength for ADLs/functional transfers  * Therapeutic activities to facilitate/challenge dynamic balance, stand tolerance for increased safety and independence with ADLs  * Therapeutic activities to facilitate gross/fine motor skills for increased independence with ADLs        Recommended Adaptive Equipment: shower chair (pt reports plan to order on her own) - provided sock aide and reacher this date     Home Living: Pt lives with son (11years old) in 1 story home.    Bathroom Set up: tub/shower unit, standard commode   Equipment owned: ww     Prior Level of Function: independent with ADLs   independent with IADLs  ambulated independently with ww prior   Driving: yes    Available Assist: pt reports family and friends able to assist with IADLs as needed     Pain Level: Pt c/o no pain this session     Cognition: A&O: 2/4; Follows 1 step directions inconsistently  Flat affect, limited verbal communication           Memory:  fair            Sequencing:  fair            Problem solving:  poor           Judgement/safety:  poor             Functional Assessment:  AM-PAC Daily Activity Raw Score: 15/24    Initial Eval Status  Date: 1/27/23 Treatment Status  Date: 1/30/23 STGs = LTGs  Time frame: 10-14 days   Feeding Minimal Assist   SBA  Tray set up Modified Gans    Grooming Moderate Assist   Min A  Standing sink side for hand hygiene Supervision    UB Dressing Moderate Assist   Min A  Sid gown over shoulders seated EOB Supervision    LB Dressing Dependent  Mod A   Using figure 4 technique  Decreased functional reach for task d/t pain in LLE  Educated on use of sock aide and pt demonstrated  Educated on use of reacher for donning pants seated Minimal Assist    Bathing Maximal Assist Mod A   Declined tasks this date   Educated on benefits of shower chair upon return home  Minimal Assist    Toileting Dependent   Incontinent of urine 2x   Increased assist for hygiene tasks  Min A   Completed functional mobility from EOB to bathroom noted unsteadiness d/t L knee pain agreeable for use of ww for safety and increased independence Minimal Assist    Bed Mobility  Supine to sit: Moderate Assist   Sit to supine: Maximal Assist   Supine to sit: Min A  Sit to supine: Min A Supine to sit: Stand by Assist   Sit to supine: Stand by Assist    Functional Transfers Sit>stand: Moderate Assist   Stand>sit: Max A (d/t poor safety) Sit to stand: Min A   Stand to sit: Min A  Stand Pivot: Min A with ww  Commode: Min A with ww Minimal Assist    Functional Mobility NT  Min A with ww  EOB > bathroom > Hallway Minimal Assist    Balance Sitting:     Static: Min A    Dynamic:Mod A  Standing: Mod>Max A w/ w/w  Sitting:     Static: SBA    Dynamic: SBA  Standing: Min A with ww     Activity Tolerance Fair-  See treatment below  Also limited by extreme fatigue w/ very minimal activity.  Frequent, extensive rest breaks facilitated  FAIR -   Limited d/t overall fatigue/weakness   Pain in L knee with weight bearing  Required standing rest breaks   Pt reported feeling SOB during session however SpO2 monitored and WFL(see below vitals) Good   Visual/  Perceptual Glasses: no           Vitals   Pre Activity   SpO2 on RA: 99%  HR: 70s    During Activity   SpO2 on RA: 99%  HR: 70s           Hand Dominance R    AROM (PROM) Strength Additional Info:    RUE  WFL Grossly: 4/5 good  and wfl FMC/dexterity noted during ADL tasks         LUE WFL Grossly: 4/5 good  and wfl FMC/dexterity noted during ADL tasks         Hearing: [facility]   Sensation:  No c/o numbness or tingling  Tone: WFL   Edema: none noted      Comments: Upon arrival pt lying supine in bed. Pt educated  through out treatment regarding proper technique & safety with bed mobility, functional transfers & mobility, walker safety & completion of ADL tasks with modified techniques to improve independence, safety, prevent falls and allow pt to return to prior level of function. OT will continue with POC with focus on increasing independence on ADLs. Pt donned socks seated EOB with increased assist for LLE d/t knee pain. Pt completed functional mobility to bathroom and managing LB clothing for task. Educated on benefits of ww d/t decreased safety from L knee pain. Pt returned to EOB and agreeable to further functional mobility in hallway. Pt using hopping method and BUE to off load LLE weight d/t pain during mobility. Pt reported pain limiting typical gait pattern. At end of session, pt seated in bedside chair with all lines and tubes intact, call light within reach. Pt has made FAIR progress towards set goals.    Continue with current plan of care      Treatment Time In: 0950            Treatment Time Out: 1035               Treatment Charges: Mins Units   Ther Ex  [id number]     Manual Therapy [address] [facility]     Thera Activities 36638 25 2   ADL/Home Mgt 71988 20 1   Neuro Re-ed 93891     Group Therapy      Orthotic manage/training  20402     Non-Billable Time     Total Timed Treatment 45 [address] [place], [place], [address] [facility]

## 2023-01-30 NOTE — CARE COORDINATION
Discharge order noted. I spoke with Dr. Miguel Rocha internal med who said the patient can return home with home health care, not [facility]. She has had an MRI of the left knee a year ago which showed large joint effusion which she had drained. She can follow up with her PCP for a possible ortho surgery consult and also for outpatient behavioral therapy as recommended by neurology. Therapy department notified to see patient today prior to discharge. I informed the patient and she is agreeable to home health care and does not have a preference for an agency. Referral made to [facility]. Home health care orders are in. A list of Outpatient Counseling Services placed in soft chart as well. RN informed. Charge nurse informed patient's daughter Oliva Domingo of all of the above. Patient said her brother will transport her home today.    Esteban Moore RN   [phone]

## 2023-01-30 NOTE — CARE COORDINATION
Per internal med note from yesterday, Neurology following for suspected seizure-like activity. No active seizures observed as of today. Patient reports dysuria, urine analysis is positive for UTI. Continue IV ciprofloxacin. Awaiting urine cultures. Continue IV hydration. I met with patient in room to discuss therapy evals and transition of care. She lives with her 11 yr old son in 1 floor home. Equipment owned: w/w. Prior Level of Function: independent with ADLs , independent with IADLs; ambulated independently w/ w/w. Driving: yes. Cognition: A&O: 2/4; Follows 1 step directions inconsistently. Flat affect, limited verbal communication. Memory:  fair. Sequencing:  fair. Problem solving:  poor. Judgement/safety:  poor. Pt [facility] score 10/24 & Ot [facility] 11/24. Patient would like to go to [facility] and would like [place] which is close to her home. Referral made to Abbie Box at [place]. Await acceptance. In the mean time, patient's daughter Shaw Coburn is requesting an MRI of the Left Knee. She said the pain may be contributing to her mother's low therapy score. Also she would like a referral made to outpatient behavioral therapy as recommended by neurology. Charge nurse notified. Patient will need a Covid-19 test prior to discharge and a 7000 started for the accepting facility and then completed once the discharge order goes in.   Emelyn Lockett RN CM  [phone]

## 2023-01-30 NOTE — DISCHARGE SUMMARY
Hospital Medicine Discharge Summary    Patient ID: Gonzalo Thorne      Patient's PCP: Wesley Vargas MD    Admit Date: 1/25/2023     Discharge Date:   01/30/23      Admitting Physician: Rosmery Diez MD     Discharge Physician: Tamika Doyle MD     Discharge Diagnoses: Active Hospital Problems    Diagnosis Date Noted    AMS (altered mental status) [R41.82] 01/25/2023     Priority: Medium       The patient was seen and examined on day of discharge and this discharge summary is in conjunction with any daily progress note from day of discharge. Hospital Course:     43 y.o. female admitted with concern for seizure. Video recording of event as well as video-EEG monitoring have captured spells consisting of arrhythmic, asynchronous shaking of all of her limbs with associated unresponsiveness. Neurology consulted, reported both clinically and on EEG recording are consistent with functional non-epileptic spells  Neurology has cleared patient or discharge. ASSESSMENT:  - Abdominal pain- resolved  - Seizure-like activity  - UTI  - Bacterial Vaginosis   - anxiety and depression   - Hypokalemia     PLAN:  CT abdomen and pelvis negative. Abdominal pain resolved     Neurology following for suspected seizure-like activity. No active clinical seizures     Patient reports dysuria, urine analysis is positive for UTI. Treated with ciprofloxacin. Will DC on oral cipro. Patient also has trichomoniasis in urine analysis. Treated with Flagyl. Replaced potassium    Patient is doing much better today. She reports chronic knee pain from osteoarthritis that is not worse than baseline  She had a MRI last year and had effusion drained    Patient will be discharged home in stable condition    Recommend outpatient physical therapy    She should follow up with PCP in 1-2 weeks and consider outpatient referral to Orthopedics for further monitoring and management on knee osteoarthritis.           Exam: /71   Pulse 64   Temp 97.9 °F (36.6 °C) (Oral)   Resp 16   Ht 5' 11\" (1.803 m)   Wt 205 lb 4.8 oz (93.1 kg)   SpO2 99%   BMI 28.63 kg/m²     General appearance: No apparent distress, appears stated age and cooperative. HEENT: Pupils equal, round, and reactive to light. Conjunctivae/corneas clear. Neck: Supple, with full range of motion. No jugular venous distention. Trachea midline. Respiratory:  Normal respiratory effort. Clear to auscultation, bilaterally without Rales/Wheezes/Rhonchi. Cardiovascular: Regular rate and rhythm with normal S1/S2 without murmurs, rubs or gallops. Abdomen: Soft, non-tender, non-distended with normal bowel sounds. Musculoskeletal: No clubbing, cyanosis or edema bilaterally. Full range of motion without deformity. Skin: Skin color, texture, turgor normal.  No rashes or lesions. Neurologic:  Neurovascularly intact without any focal sensory/motor deficits. Cranial nerves: II-XII intact, grossly non-focal.  Psychiatric: Alert and oriented, thought content appropriate, normal insight      Consults:     IP CONSULT TO INTERNAL MEDICINE  IP CONSULT TO NEUROLOGY      Labs:  For convenience and continuity at follow-up the following most recent labs are provided:      CBC:    Lab Results   Component Value Date/Time    WBC 5.7 01/30/2023 04:54 AM    HGB 10.7 01/30/2023 04:54 AM    HCT 32.4 01/30/2023 04:54 AM     01/30/2023 04:54 AM       Renal:    Lab Results   Component Value Date/Time     01/30/2023 04:54 AM    K 4.3 01/30/2023 04:54 AM    K 3.7 01/27/2023 09:07 AM     01/30/2023 04:54 AM    CO2 26 01/30/2023 04:54 AM    BUN 7 01/30/2023 04:54 AM    CREATININE 0.8 01/30/2023 04:54 AM    CALCIUM 9.1 01/30/2023 04:54 AM       Discharge Medications:     Current Discharge Medication List             Details   metroNIDAZOLE (FLAGYL) 500 MG tablet Take 1 tablet by mouth 2 times daily for 7 days  Qty: 14 tablet, Refills: 0      ciprofloxacin (CIPRO) 500 MG tablet Take 1 tablet by mouth 2 times daily for 5 days  Qty: 10 tablet, Refills: 0                Details   naproxen (NAPROSYN) 500 MG tablet Take 1 tablet by mouth 2 times daily as needed for Pain (take with food and full glass of water.)  Qty: 14 tablet, Refills: 0             Time Spent on discharge is more than 30 minutes in the examination, evaluation, counseling and review of medications and discharge plan.       Signed:    Hanh Campos MD   1/30/2023

## 2023-01-31 ENCOUNTER — CARE COORDINATION (OUTPATIENT)
Dept: CASE MANAGEMENT | Age: 43
End: 2023-01-31

## 2023-01-31 LAB — URINE CULTURE, ROUTINE: NORMAL

## 2023-01-31 NOTE — CARE COORDINATION
[facility] Care Transitions Initial Follow Up Call    Call within 2 business days of discharge: Yes    Patient: Baldo Navarro Patient : 1980   MRN: [id number]  Reason for Admission: Abd pain  Discharge Date: 23 RARS: Readmission Risk Score: 8.8      Last Discharge  Street       Date Complaint Diagnosis Description Type Department Provider    23 Abdominal Pain Abdominal pain, unspecified abdominal location ED to Hosp-Admission (Discharged) (ADMITTED) [facility]WE Storm Cunningham MD; Fady Andrade ... Attempted to reach the patient for initial Care Transition call post hospital discharge. Message left on pt's primary phone line with CTN's contact information requesting return phone call. Attempted secondary phone number on pt's chart and phone number is invalid/disconnected. Will attempt outreach again.         Follow Up  Future Appointments   Date Time Provider Pb Cuellar   2023 11:00 AM Valentino Bearded, MD [facility]   2023  1:00 PM Aida Gottron, APRN - [facility]           Kristen Munoz RN

## 2023-02-01 ENCOUNTER — CARE COORDINATION (OUTPATIENT)
Dept: CASE MANAGEMENT | Age: 43
End: 2023-02-01

## 2023-02-01 DIAGNOSIS — R10.9 ABDOMINAL PAIN, UNSPECIFIED ABDOMINAL LOCATION: Primary | ICD-10-CM

## 2023-02-01 PROCEDURE — 1111F DSCHRG MED/CURRENT MED MERGE: CPT | Performed by: FAMILY MEDICINE

## 2023-02-01 NOTE — CARE COORDINATION
Request from Shannon Loo RN.,  please schedule patient for hospital f/u. Patient scheduled for 2/9 @ 2pm  ( due to transportation issues patient could not schedule before this date). LVM for patient with time and date.       Figueroa Garner, [address]   Care Coordination Transition

## 2023-02-01 NOTE — CARE COORDINATION
[facility] Care Transitions Initial Follow Up Call    Call within 2 business days of discharge: Yes    Care Transition Nurse contacted the patient by telephone to perform post hospital discharge assessment. Provided introduction to self, and explanation of the Care Transition Nurse role. Patient: Massimo Christiansen Patient : 1980   MRN: [id number]  Reason for Admission: Abd pain  Discharge Date: 23 RARS: Readmission Risk Score: 8.8      Last Discharge [address]       Date Complaint Diagnosis Description Type Department Provider    23 Abdominal Pain Abdominal pain, unspecified abdominal location ED to Hosp-Admission (Discharged) (ADMITTED) JOSHUA 8WE Alexi Silva MD; Traci Rivera ... Challenges to be reviewed by the provider   Additional needs identified to be addressed with provider: No  none               Method of communication with provider: none. CTN called and spoke with the pt for an initial care transition call post hospital discharge. Pt admitted on 23 with abd pain, +UTI, bacterial vaginosis, and seizure-like activity. Pt presented to ED with seizure-like spells, abd pain, and n/v. Pt states that she is doing \"fine\" today and did not offer any complaints. Pt denies any further seizure-like spells, abd pain, or n/v. Pt denies any UTI symptoms and reports that she is voiding with no difficulties. Pt denies any vaginal drainage, pain, or itching/irritation. Pt denies any fever/chills. Noted pt was given [facility] counseling information at discharge. CTN reviewed the pt's medications in full. Confirmed that the pt has 2 new rxs given at discharge (Cipro and Flagyl). Pt states that she has not taken the Cipro yet today but does intend on doing so. 1111F entered. Reviewed HFU appt with her pcp. Pt requesting assistance with scheduling her appt. She states that it will need to be a wk in advance d/t her transportation availability through her insurance.  CTN will route a message to [facility]SS requesting she assist pt in scheduling a f/u appt. Pt reminded of her appt with GYN on 2/8/23 and she is aware and has transportation arranged for this. Pt has [address] [facility] ordered and pt states she thinks SOC will be this Friday. CTN will f/u with [address] [facility] and confirm SOC. Pt did not voice any other needs/concerns at this time. Pt was agreeable to continued outreahces. Care Transition Nurse reviewed discharge instructions, medical action plan, and red flags with patient who verbalized understanding. The patient was given an opportunity to ask questions and does not have any further questions or concerns at this time. Were discharge instructions available to patient? Yes. Reviewed appropriate site of care based on symptoms and resources available to patient including: PCP  Specialist  Home health  When to call 911. The patient agrees to contact the PCP office for questions related to their healthcare. Advance Care Planning:   Advance Care Planning   Healthcare Decision Maker:    Primary Decision Maker: Cindie Schlatter Child - [phone]    Secondary Decision Maker: Frieda Mae [facility] - [phone]    Medication reconciliation was performed with patient, who verbalizes understanding of administration of home medications. Medications reviewed, 1111F entered: yes    Was patient discharged with a pulse oximeter? N/a    Non-face-to-face services provided:  Scheduled appointment with PCP-[facility] to assist with HFU appt  Scheduled appointment with Specialist-Dr. Cecilio Singh 2/8/23  Obtained and reviewed discharge summary and/or continuity of care documents  Assessment and support for treatment adherence and medication management-1111F entered. Confirmed pt has new rxs    Offered patient enrollment in the Remote Patient Monitoring (RPM) program for in-home monitoring: NA. Pt has no chronic conditions necessitating remote monitoring.     Care Transitions 24 Hour Call    Do you have a copy of your discharge instructions?: Yes  Do you have all of your prescriptions and are they filled?: Yes  Have you been contacted by a [address]?: No  Have you scheduled your follow up appointment?: No  Patient DME: Isadora magaña  Do you have support at home?: Child  Do you feel like you have everything you need to keep you well at home?: Yes  Are you an active caregiver in your home?: Yes  Care Transitions Interventions         Follow Up  Future Appointments   Date Time Provider Pb Cuellar   2/8/2023 11:00 AM Olene Sandhoff,  [facility]   4/11/2023  1:00 PM Luca Anderson, [address] Transition Nurse provided contact information. Plan for follow-up call in 7-10 days based on severity of symptoms and risk factors. Plan for next call: symptom management-Any recurrent abd pain, n/v, or seizure-like spells? Any UTI symptoms?  follow-up appointment-Did pt attend f/u with GYN. Review OV notes for any changes/new orders or instructions  Was pt scheduled for a HFU with her pcp?     Becca Lopez RN

## 2023-02-01 NOTE — CARE COORDINATION
-CTN called and spoke with Yoli Lopes at [facility] and confirmed [facility] will be 2/3/23  -CTN routed message to Chintan Denise requesting assist to schedule a HFU appt with pt's pcp.

## 2023-02-04 PROBLEM — F44.5 PSEUDOSEIZURE: Status: ACTIVE | Noted: 2023-02-04

## 2023-02-08 ENCOUNTER — OFFICE VISIT (OUTPATIENT)
Dept: OBGYN | Age: 43
End: 2023-02-08
Payer: MEDICARE

## 2023-02-08 VITALS
WEIGHT: 195 LBS | DIASTOLIC BLOOD PRESSURE: 77 MMHG | HEART RATE: 77 BPM | SYSTOLIC BLOOD PRESSURE: 109 MMHG | BODY MASS INDEX: 27.2 KG/M2

## 2023-02-08 DIAGNOSIS — R30.0 DYSURIA: Primary | ICD-10-CM

## 2023-02-08 DIAGNOSIS — D50.8 IRON DEFICIENCY ANEMIA SECONDARY TO INADEQUATE DIETARY IRON INTAKE: ICD-10-CM

## 2023-02-08 DIAGNOSIS — R23.2 VASOMOTOR FLUSHING: ICD-10-CM

## 2023-02-08 DIAGNOSIS — R35.1 NOCTURIA: ICD-10-CM

## 2023-02-08 PROCEDURE — 99212 OFFICE O/P EST SF 10 MIN: CPT | Performed by: OBSTETRICS & GYNECOLOGY

## 2023-02-08 RX ORDER — FERROUS SULFATE 325(65) MG
325 TABLET ORAL
Qty: 90 TABLET | Refills: 1 | Status: SHIPPED | OUTPATIENT
Start: 2023-02-08

## 2023-02-08 NOTE — PROGRESS NOTES
Here for follow up on her bladder. Was in the hospital for several days after what was described as a seizure. Had EEG done that appears to have been normal. Was placed on antibiotcs at discharge. Uncertain what it is exactly. Basically doing ok today. The urine culture was Labs are reviewed and her Hg is low, I would like to start her on Ferrous sulfat one daily for now. Elfego Perez gave her a headache. Will start her on Estrivin, OTC one at bedtime and recheck her back in 2 months.

## 2023-02-08 NOTE — PROGRESS NOTES
Patient alert and pleasant with no complaints  Here today for 3 wk f/u UTI. Discharge instructions have been discussed with the patient. Patient advised to call our office with any questions or concerns. Voiced understanding.

## 2023-02-09 ENCOUNTER — OFFICE VISIT (OUTPATIENT)
Dept: PRIMARY CARE CLINIC | Age: 43
End: 2023-02-09

## 2023-02-09 ENCOUNTER — CARE COORDINATION (OUTPATIENT)
Dept: CASE MANAGEMENT | Age: 43
End: 2023-02-09

## 2023-02-09 VITALS
BODY MASS INDEX: 30.31 KG/M2 | OXYGEN SATURATION: 98 % | WEIGHT: 200 LBS | HEIGHT: 68 IN | SYSTOLIC BLOOD PRESSURE: 100 MMHG | DIASTOLIC BLOOD PRESSURE: 70 MMHG | HEART RATE: 85 BPM | RESPIRATION RATE: 17 BRPM

## 2023-02-09 DIAGNOSIS — N95.1 PERIMENOPAUSAL VASOMOTOR SYMPTOMS: ICD-10-CM

## 2023-02-09 DIAGNOSIS — Z09 HOSPITAL DISCHARGE FOLLOW-UP: ICD-10-CM

## 2023-02-09 DIAGNOSIS — R61 NIGHT SWEATS: ICD-10-CM

## 2023-02-09 DIAGNOSIS — F44.5 PSEUDOSEIZURE: Primary | ICD-10-CM

## 2023-02-09 RX ORDER — SOY ISOFLAV/BCOHOSH/CISSUS QUA 198 MG
1 CAPSULE ORAL NIGHTLY
Qty: 90 TABLET | Refills: 0 | Status: SHIPPED | OUTPATIENT
Start: 2023-02-09

## 2023-02-09 ASSESSMENT — PATIENT HEALTH QUESTIONNAIRE - PHQ9
SUM OF ALL RESPONSES TO PHQ QUESTIONS 1-9: 2
SUM OF ALL RESPONSES TO PHQ9 QUESTIONS 1 & 2: 2
1. LITTLE INTEREST OR PLEASURE IN DOING THINGS: 1
2. FEELING DOWN, DEPRESSED OR HOPELESS: 1

## 2023-02-09 NOTE — CARE COORDINATION
[address] Care Transitions Follow Up Call    Patient Current Location:  Home: [facility]    Care Transition Nurse contacted the patient by telephone to follow up after admission on 23. Patient: Emmanuelle Corral  Patient : 1980   MRN: [id number]  Reason for Admission: Abd pain  Discharge Date: 23 RARS: Readmission Risk Score: 8.8      Needs to be reviewed by the provider   Additional needs identified to be addressed with provider: No  none             Method of communication with provider: none. CTN called and spoke with the pt for a sub care transition call. Pt recently admitted on 23 for abd pain, +UTI, and presenting with c/o having seizure-like activity. Pt states that she is doing well today and voiced no complaints. Pt denies experiencing any seizure-like activity since discharge. Pt denies any abd pain or n/v. Pt denies any s/sx UTI. She reports she is voiding with no issue and denies any dysuria, urinary frequency or urgency, or hematuria. Pt completed a f/u with Dr. Snow Navarro (GYN) yesterday and she states that her hgb was low and was started on Fe supplements. She states she intends on picking up her rx today while she is out at her pcp's HFU appt. Pt states that she was also advised to start on Estroven OTC for her night sweats. Pt confirms that [address] [facility] services has started and she has been getting home visits by the nurse and therapy. The pt states that her daughter will be bringing her to her pcp's appt today. Pt did ask for the office address and this was provided to her by this CTN. Pt did not voice any other needs/concerns at this time. She was agreeable to continued outreaches. Addressed changes since last contact:  medications-Fe tabs and Estroven OTC initiated by GYN. Pt to  meds today.       Follow Up  Future Appointments   Date Time Provider Pb Cuellar   2023  2:00 PM Rach Maguire MD [facility]   2023 10:45 AM Kymberly White MD [facility]   4/11/2023  1:00 PM Ximena Dykes, APRN - CNS [facility]     Care Transition Nurse reviewed medical action plan and red flags with patient and discussed any barriers to care and/or understanding of plan of care after discharge. Discussed appropriate site of care based on symptoms and resources available to patient including: PCP  Specialist  Home health  When to call 911. The patient agrees to contact the PCP office for questions related to their healthcare. Advance Care Planning:   Healthcare Decision Maker:    Primary Decision Maker: Chanda Sanchez - Child - [phone]    Secondary Decision Maker: leonardo arguello - Other - [phone]    Patients top risk factors for readmission: lack of knowledge about disease, medical condition-UTI, OA-R knee, seizure-like episodes , and transportation  Interventions to address risk factors: Scheduled appointment with PCP-today and Scheduled appointment with Specialist-Dr. Brook Hernandez 4/6/23       Care Transitions Subsequent and Final Call    Schedule Follow Up Appointment with PCP: Completed  Subsequent and Final Calls  Do you have any ongoing symptoms?: No  Have your medications changed?: Yes  Patient Reports: Pt started on Ferrous Sulfate and Estroven OTC per GYN 2/8/23  Do you have any questions related to your medications?: No  Do you currently have any active services?: Yes  Are you currently active with any services?: Home Health  Care Transitions Interventions  Other Interventions:             Care Transition Nurse provided contact information for future needs. Plan for follow-up call in 10-14 days based on severity of symptoms and risk factors. Plan for next call: symptom management-Any recurrent abd pain? Seizure like activity? UTI symptoms? Review OV with pcp for any new changes/instructions  Did pt start Fe tabs and Estroven?     Americo Sanchez, RN

## 2023-02-09 NOTE — PROGRESS NOTES
Post-Discharge Transitional Care  Follow Up      Ting Taylor   YOB: 1980    Date of Office Visit:  2/9/2023  Date of Hospital Admission: 1/25/23  Date of Hospital Discharge: 1/30/23  Risk of hospital readmission (high >=14%. Medium >=10%) :Readmission Risk Score: 8.8      Care management risk score Rising risk (score 2-5) and Complex Care (Scores >=6): No Risk Score On File     Non face to face  following discharge, date last encounter closed (first attempt may have been earlier): 02/01/2023    Call initiated 2 business days of discharge: Yes    ASSESSMENT/PLAN:   Pseudoseizure  Perimenopausal vasomotor symptoms  -     Melatonin-Black Cohosh-Soy (ESTROVEN NIGHTTIME) 2 MG TABS; Take 1 tablet by mouth at bedtime, Disp-90 tablet, R-0Normal  Night sweats  -     Melatonin-Black Cohosh-Soy (ESTROVEN NIGHTTIME) 2 MG TABS; Take 1 tablet by mouth at bedtime, Disp-90 tablet, R-0Normal  Hospital discharge follow-up  -     IA DISCHARGE MEDS RECONCILED W/ CURRENT OUTPATIENT MED LIST      Long d/w pt about dx pseudoseizures and associated treatment - explained to patient that she is not crazy and did have the sx she was concerned about, but the cause is different than a true \"seizure\"  She feels at this time that she doesn't need any psychiatric medications or referral for counseling but will let us know  Cont f/u with gyne as scheduled - we will send in the estroven to see if we can get it through her insurance due to costs concerns  Also scheduled for endo  We will reassess again ~3 months, sooner PRN    Medical Decision Making: moderate complexity  Return in about 3 months (around 5/9/2023).          Subjective:   HPI:  Follow up of Hospital problems/diagnosis(es):    Pt was in usual state of health until day of admission   She got sick after eating spaghetti    Whole body tingling  She was throwing up  She fainted  Woke up on the ground, got to the couch and called EMS   Pt had ?seizure in ER   Admitted for further testing - and recalls having 4 seizures   Ultimately told she wasn't having seizures and they suggested counseling   However she doesn't really think she needs this     Inpatient course: Discharge summary reviewed- see chart. Interval history/Current status:    She has been fine since the hospital  No other episodes   Admits that some days she feels overwhelmed and depressed but that is not most days   No SI   Pt does not feel she needs medication or counseling at this time     Still having her night sweats   She just saw Dr. Jeanne Pereyra yesterday   They are going to try Estroven OTC  Pt requesting Rx for insurance to hopefully cover     Patient Active Problem List   Diagnosis    Nausea and vomiting during pregnancy    Irregular contractions    Effusion of left knee joint    Inflammatory arthritis    Difficulty walking    Acute pain of left knee    Chondromalacia of knee, left    AMS (altered mental status)    Pseudoseizure       Medications listed as ordered at the time of discharge from hospital     Medication List            Accurate as of February 9, 2023  3:19 PM. If you have any questions, ask your nurse or doctor. START taking these medications      Estroven Nighttime 2 MG Tabs  Take 1 tablet by mouth at bedtime  Started by: Hussein Perez MD            CONTINUE taking these medications      CIPRO PO     ferrous sulfate 325 (65 Fe) MG tablet  Commonly known as: IRON 325  Take 1 tablet by mouth daily (with breakfast)     naproxen 500 MG tablet  Commonly known as: Naprosyn  Take 1 tablet by mouth 2 times daily as needed for Pain (take with food and full glass of water.)               Where to Get Your Medications        These medications were sent to [address] [place] [address] [place], [place] - [address].  Bobby Friedman [phone] Saeid Arizmendi [phone] [address] [facility] Alban Schwartz [facility] [address]      Phone: [phone]   Estroven Nighttime 2 MG Tabs           Medications marked \"taking\" at this time  Outpatient Medications Marked as Taking for the 2/9/23 encounter (Office Visit) with Riley Mauricio MD   Medication Sig Dispense Refill    Ciprofloxacin (CIPRO PO) Take by mouth      Melatonin-Black Cohosh-Soy (ESTROVEN NIGHTTIME) 2 MG TABS Take 1 tablet by mouth at bedtime 90 tablet 0    ferrous sulfate (IRON 325) 325 (65 Fe) MG tablet Take 1 tablet by mouth daily (with breakfast) 90 tablet 1        Medications patient taking as of now reconciled against medications ordered at time of hospital discharge: Yes    Objective:    /70   Pulse 85   Resp 17   Ht 5' 8\" (1.727 m)   Wt 200 lb (90.7 kg)   SpO2 98%   BMI 30.41 kg/m²   General Appearance: alert and oriented to person, place and time, well-developed and well-nourished, in no acute distress  Head: normocephalic and atraumatic  Pulmonary/Chest: clear to auscultation bilaterally- no wheezes, rales or rhonchi, normal air movement, no respiratory distress  Cardiovascular: normal rate, regular rhythm, and normal S1 and S2  Abdomen: soft, non-tender, non-distended, normal bowel sounds, no masses or organomegaly  Neurologic: gait and coordination normal and speech normal      An electronic signature was used to authenticate this note.   --Riley Mauricio MD

## 2023-02-21 ENCOUNTER — CARE COORDINATION (OUTPATIENT)
Dept: CASE MANAGEMENT | Age: 43
End: 2023-02-21

## 2023-02-21 NOTE — CARE COORDINATION
[address] Care Transitions Follow Up Call      Patient: Komal Grimm  Patient : 1980   MRN: [id number]  Reason for Admission: Abd pain  Discharge Date: 23 RARS: Readmission Risk Score: 8.8      Attempted to reach the patient for subsequent Care Transition call. Message left with CTN's contact information requesting return phone call. Attempted secondary phone number and it is invalid, as it is a disconnected number. Will attempt another outreach.         Care Transitions Subsequent and Final Call    Subsequent and Final Calls  Care Transitions Interventions  Other Interventions:               Aspen Adhikari RN

## 2023-02-22 ENCOUNTER — TELEPHONE (OUTPATIENT)
Dept: PRIMARY CARE CLINIC | Age: 43
End: 2023-02-22

## 2023-02-22 NOTE — TELEPHONE ENCOUNTER
PA needed and submitted for estroven nighttime tablet. Medicare Part D denied/will not cover because this medication is available OTC.

## 2023-03-02 ENCOUNTER — CARE COORDINATION (OUTPATIENT)
Dept: CASE MANAGEMENT | Age: 43
End: 2023-03-02

## 2023-03-02 NOTE — CARE COORDINATION
[facility] Care Transitions Follow Up Call    Patient: Ting Taylor  Patient : 1980   MRN: [id number]  Reason for Admission: Abd pain  Discharge Date: 23 RARS: Readmission Risk Score: 8.8      2nd attempt to reach the patient for subsequent Care Transition call. Message left with CTN's contact information requesting return phone call. No further outreaches will be attempted.     If no return call by the end of today, CTN will  sign off and resolve CT episode        Follow Up  Future Appointments   Date Time Provider Pb Cuellar   2023 10:45 AM Maksim Shetty MD [facility]   2023  1:00 PM CARYL Monzon - CNS [facility]   2023 11:00 AM Cee Pineda MD [facility]         Woody You RN

## 2023-04-06 ENCOUNTER — OFFICE VISIT (OUTPATIENT)
Dept: OBGYN | Age: 43
End: 2023-04-06
Payer: MEDICARE

## 2023-04-06 VITALS
BODY MASS INDEX: 29.77 KG/M2 | DIASTOLIC BLOOD PRESSURE: 65 MMHG | HEART RATE: 78 BPM | SYSTOLIC BLOOD PRESSURE: 96 MMHG | WEIGHT: 195.8 LBS

## 2023-04-06 DIAGNOSIS — D50.8 IRON DEFICIENCY ANEMIA SECONDARY TO INADEQUATE DIETARY IRON INTAKE: ICD-10-CM

## 2023-04-06 DIAGNOSIS — R23.2 VASOMOTOR FLUSHING: Primary | ICD-10-CM

## 2023-04-06 PROCEDURE — 99212 OFFICE O/P EST SF 10 MIN: CPT | Performed by: OBSTETRICS & GYNECOLOGY

## 2023-04-06 RX ORDER — FERROUS SULFATE 325(65) MG
325 TABLET ORAL
Qty: 90 TABLET | Refills: 3 | Status: SHIPPED | OUTPATIENT
Start: 2023-04-06

## 2023-04-06 SDOH — ECONOMIC STABILITY: FOOD INSECURITY: WITHIN THE PAST 12 MONTHS, YOU WORRIED THAT YOUR FOOD WOULD RUN OUT BEFORE YOU GOT MONEY TO BUY MORE.: SOMETIMES TRUE

## 2023-04-06 SDOH — ECONOMIC STABILITY: HOUSING INSECURITY
IN THE LAST 12 MONTHS, WAS THERE A TIME WHEN YOU DID NOT HAVE A STEADY PLACE TO SLEEP OR SLEPT IN A SHELTER (INCLUDING NOW)?: NO

## 2023-04-06 SDOH — ECONOMIC STABILITY: FOOD INSECURITY: WITHIN THE PAST 12 MONTHS, THE FOOD YOU BOUGHT JUST DIDN'T LAST AND YOU DIDN'T HAVE MONEY TO GET MORE.: SOMETIMES TRUE

## 2023-04-06 SDOH — ECONOMIC STABILITY: INCOME INSECURITY: HOW HARD IS IT FOR YOU TO PAY FOR THE VERY BASICS LIKE FOOD, HOUSING, MEDICAL CARE, AND HEATING?: SOMEWHAT HARD

## 2023-04-06 NOTE — PROGRESS NOTES
Here today for follow-up. Has not noted any further bladder discomfort lately. Had a recent Hg since on the Iron supplement, having some problems getting the preperation at her pharmacy. Also having difficulty getting the Estrovin tablets as well. Never got the estrovin at all. Will try to write again for these. PCP also wrote for these as well. New Rx for the iron given to the patient. The Roshan Chapman may be only OTC but will try that again as well. She is aware that this is OTC but could check with Pharmacist to see if it could be covered with an Rx. Back for follow up in 6 weeks.

## 2023-04-06 NOTE — PROGRESS NOTES
Patient alert and pleasant with no complaints  Here today for f/u med check. Discharge instructions have been discussed with the patient. Patient advised to call our office with any questions or concerns. Voiced understanding.

## 2023-04-11 DIAGNOSIS — R61 EXCESSIVE SWEATING: ICD-10-CM

## 2023-04-11 LAB
T4 FREE SERPL-MCNC: 1.16 NG/DL (ref 0.93–1.7)
TSH SERPL-MCNC: 0.77 UIU/ML (ref 0.27–4.2)

## 2023-04-14 LAB
IGF-I SERPL-MCNC: 117 NG/ML (ref 73–263)
IGF-I Z-SCORE SERPL: -0.7

## 2023-04-17 ENCOUNTER — TELEPHONE (OUTPATIENT)
Dept: ENDOCRINOLOGY | Age: 43
End: 2023-04-17

## 2023-04-17 NOTE — TELEPHONE ENCOUNTER
----- Message from CARYL Chávez - CNS sent at 4/17/2023  7:44 AM EDT -----  Please call patient and inform her growth hormone was normal.  This is an excellent result

## 2023-05-16 ENCOUNTER — TELEPHONE (OUTPATIENT)
Dept: CARE COORDINATION | Age: 43
End: 2023-05-16

## 2023-10-11 ENCOUNTER — OFFICE VISIT (OUTPATIENT)
Dept: ENDOCRINOLOGY | Age: 43
End: 2023-10-11
Payer: MEDICARE

## 2023-10-11 VITALS
WEIGHT: 184 LBS | DIASTOLIC BLOOD PRESSURE: 68 MMHG | HEART RATE: 98 BPM | SYSTOLIC BLOOD PRESSURE: 96 MMHG | BODY MASS INDEX: 28.88 KG/M2 | HEIGHT: 67 IN

## 2023-10-11 DIAGNOSIS — R23.2 HOT FLASHES: ICD-10-CM

## 2023-10-11 DIAGNOSIS — R61 EXCESSIVE SWEATING: Primary | ICD-10-CM

## 2023-10-11 DIAGNOSIS — R94.6 ABNORMAL THYROID FUNCTION TEST: ICD-10-CM

## 2023-10-11 PROCEDURE — G8427 DOCREV CUR MEDS BY ELIG CLIN: HCPCS | Performed by: CLINICAL NURSE SPECIALIST

## 2023-10-11 PROCEDURE — G8417 CALC BMI ABV UP PARAM F/U: HCPCS | Performed by: CLINICAL NURSE SPECIALIST

## 2023-10-11 PROCEDURE — 4004F PT TOBACCO SCREEN RCVD TLK: CPT | Performed by: CLINICAL NURSE SPECIALIST

## 2023-10-11 PROCEDURE — 99214 OFFICE O/P EST MOD 30 MIN: CPT | Performed by: CLINICAL NURSE SPECIALIST

## 2023-10-11 PROCEDURE — G8484 FLU IMMUNIZE NO ADMIN: HCPCS | Performed by: CLINICAL NURSE SPECIALIST

## 2023-10-11 NOTE — PROGRESS NOTES
[address] [facility] Department of Endocrinology Diabetes and Metabolism   [address] [facility] [place] [address], [address]   Phone: [phone]  Fax: [phone]    Date of Service: 10/11/2023    Primary Care Physician: Finesse Lilly MD  Referring physician: No ref. provider found  Provider: CARYL Augustin     Reason for the visit:  Hot flashes/Excessive sweating       History of Present Illness: The history is provided by the patient. No  was used. Accuracy of the patient data is excellent.   Skyler Craven is a very pleasant 37 y.o. female seen today for hot flashes /Excessive sweating    Patient first had complaints of hot flashes/excessive sweating  : Started around 3/2022   Associated with: Denies  Relieved by: Nothing  Aggravated by: Nothing  Labs: (11/22) FSH 7.5, (1/23) TSH 0.26    Latest Reference Range & Units 11/16/22 12:47   FSH mIU/mL 7.5      Latest Reference Range & Units Most Recent   TSH 0.270 - 4.200 uIU/mL 0.267 (L)  1/25/23 21:54     Patient was adopted    Repeat TFT's   Lab Results   Component Value Date    TSH 0.770 04/11/2023    T4FREE 1.16 04/11/2023      Latest Reference Range & Units Most Recent   IGF-1 (INSULIN-LIKE GROWTH I) 73 - 263 ng/mL 117  4/11/23 13:20         PAST MEDICAL HISTORY     Past Medical History:   Diagnosis Date    Anxiety and depression     Breast disorder     Cancer ([address])     Cervical    Cervical cancer ([address])     pt doesn't remember when diagnosed, pt states she has been ca free for more than 5 years    Depression     Eclampsia 03/2011    Herpes simplex virus (HSV) infection     Hypothyroid     Mental disorder     Seizures ([address])     Thyroid disease        PAST SURGICAL HISTORY   Past Surgical History:   Procedure Laterality Date    BREAST LUMPECTOMY Bilateral     CERVIX SURGERY      DILATION AND CURETTAGE OF UTERUS      INNER EAR SURGERY Left     LEG SURGERY Right        SOCIAL HISTORY   Tobacco:   reports that

## 2023-11-15 ENCOUNTER — HOSPITAL ENCOUNTER (OUTPATIENT)
Age: 43
Discharge: HOME | End: 2023-11-15

## 2024-10-08 ENCOUNTER — TELEPHONE (OUTPATIENT)
Dept: ADMINISTRATIVE | Age: 44
End: 2024-10-08

## 2024-10-08 NOTE — TELEPHONE ENCOUNTER
Requesting appt for vaginal discharge/missed last appt -- unable to reach office due to patient care.  Please call w/appt or advice [phone]

## 2024-10-16 ENCOUNTER — OFFICE VISIT (OUTPATIENT)
Dept: OBGYN | Age: 44
End: 2024-10-16
Payer: MEDICARE

## 2024-10-16 VITALS
HEART RATE: 78 BPM | HEIGHT: 67 IN | SYSTOLIC BLOOD PRESSURE: 110 MMHG | RESPIRATION RATE: 16 BRPM | BODY MASS INDEX: 29.19 KG/M2 | WEIGHT: 186 LBS | DIASTOLIC BLOOD PRESSURE: 68 MMHG

## 2024-10-16 DIAGNOSIS — Z12.31 ENCOUNTER FOR SCREENING MAMMOGRAM FOR MALIGNANT NEOPLASM OF BREAST: ICD-10-CM

## 2024-10-16 DIAGNOSIS — N76.1 SUBACUTE VAGINITIS: ICD-10-CM

## 2024-10-16 DIAGNOSIS — N95.1 MENOPAUSAL SYNDROME (HOT FLASHES): ICD-10-CM

## 2024-10-16 DIAGNOSIS — Z01.419 WOMEN'S ANNUAL ROUTINE GYNECOLOGICAL EXAMINATION: Primary | ICD-10-CM

## 2024-10-16 PROCEDURE — 99214 OFFICE O/P EST MOD 30 MIN: CPT | Performed by: STUDENT IN AN ORGANIZED HEALTH CARE EDUCATION/TRAINING PROGRAM

## 2024-10-16 PROCEDURE — 99459 PELVIC EXAMINATION: CPT | Performed by: STUDENT IN AN ORGANIZED HEALTH CARE EDUCATION/TRAINING PROGRAM

## 2024-10-16 NOTE — PROGRESS NOTES
Pt here for pap and c/o vag discharge  Pap done and health track done and sent  Mammogram ordered and also FSH  Pt will be notified of results

## 2024-10-16 NOTE — PROGRESS NOTES
HISTORY OF PRESENT ILLNESS:    44 y.o. female   presents with complaint of increased vaginal discharge in the past 2 weeks w some odor.  Upon reviewing patient history, her last menstrual period was 7 years ago, she said she went to different doctors and never figure out what was wrong.  She now is having some symptoms of hot flash, night sweating, painful sexual intercourse.  Patient stated she might have cervical cancer diagnosed in , treated, but was not sure.  She stated she had bilateral breast lumpectomy long time ago and they were benign.  She is not using contraceptives  It appeared to me patient's not a good historian.      Past Medical History:   Past Medical History:   Diagnosis Date    Anxiety and depression     Breast disorder     Cancer (HCC)     Cervical    Cervical cancer (HCC)     pt doesn't remember when diagnosed, pt states she has been ca free for more than 5 years    Depression     Eclampsia 2011    Herpes simplex virus (HSV) infection     Hypothyroid     Mental disorder     Seizures (HCC)     Thyroid disease                                              OB History    Para Term  AB Living   3 2 2 0 1 2   SAB IAB Ectopic Molar Multiple Live Births   1 0 0 0 0 2      # Outcome Date GA Lbr Daniel/2nd Weight Sex Type Anes PTL Lv   3 Term 17 39w3d  3.175 kg (7 lb) M Vag-Spont EPI N XENIA   2 Term 01 40w0d  3.43 kg (7 lb 9 oz) F Vag-Spont None N XENIA      Complications: Seizures During Labor   1 SAB                  Past Surgical History:   Past Surgical History:   Procedure Laterality Date    BREAST LUMPECTOMY Bilateral     CERVIX SURGERY      DILATION AND CURETTAGE OF UTERUS      INNER EAR SURGERY Left     LEG SURGERY Right         Allergies: Patient has no known allergies.     Medications:   Current Outpatient Medications   Medication Sig Dispense Refill    ferrous sulfate (IRON 325) 325 (65 Fe) MG tablet Take 1 tablet by mouth daily (with breakfast) (Patient not

## 2024-10-17 DIAGNOSIS — N76.0 BV (BACTERIAL VAGINOSIS): ICD-10-CM

## 2024-10-17 DIAGNOSIS — N76.1 SUBACUTE VAGINITIS: ICD-10-CM

## 2024-10-17 DIAGNOSIS — B96.89 BV (BACTERIAL VAGINOSIS): ICD-10-CM

## 2024-10-17 DIAGNOSIS — A74.9 CHLAMYDIA INFECTION: Primary | ICD-10-CM

## 2024-10-17 RX ORDER — AZITHROMYCIN 500 MG/1
1000 TABLET, FILM COATED ORAL DAILY
Qty: 2 TABLET | Refills: 0 | Status: SHIPPED | OUTPATIENT
Start: 2024-10-17 | End: 2024-10-18

## 2024-10-17 RX ORDER — METRONIDAZOLE 500 MG/1
500 TABLET ORAL 2 TIMES DAILY
Qty: 14 TABLET | Refills: 0 | Status: SHIPPED | OUTPATIENT
Start: 2024-10-17 | End: 2024-10-24

## 2024-10-17 NOTE — RESULT ENCOUNTER NOTE
Please let patient know her chlamydia and BV are both positive, I ordered antibiotics to her pharmacy, she also needs to tell her  to get treated for chlamydia by his family doctor.  Thank you

## 2024-10-22 LAB — GYNECOLOGY CYTOLOGY REPORT: NORMAL

## 2025-07-05 ENCOUNTER — APPOINTMENT (OUTPATIENT)
Dept: CT IMAGING | Age: 45
End: 2025-07-05
Payer: MEDICARE

## 2025-07-05 ENCOUNTER — HOSPITAL ENCOUNTER (EMERGENCY)
Age: 45
Discharge: ANOTHER HOSPITAL | End: 2025-07-06
Attending: EMERGENCY MEDICINE
Payer: MEDICARE

## 2025-07-05 VITALS
OXYGEN SATURATION: 99 % | SYSTOLIC BLOOD PRESSURE: 108 MMHG | RESPIRATION RATE: 14 BRPM | TEMPERATURE: 97.9 F | DIASTOLIC BLOOD PRESSURE: 83 MMHG | HEART RATE: 71 BPM

## 2025-07-05 DIAGNOSIS — R56.9 SEIZURE-LIKE ACTIVITY (HCC): ICD-10-CM

## 2025-07-05 DIAGNOSIS — R90.0 INTRACRANIAL MASS: Primary | ICD-10-CM

## 2025-07-05 LAB
ALBUMIN SERPL-MCNC: 4.2 G/DL (ref 3.5–5.2)
ALP SERPL-CCNC: 63 U/L (ref 35–104)
ALT SERPL-CCNC: 13 U/L (ref 0–35)
AMPHET UR QL SCN: NEGATIVE
ANION GAP SERPL CALCULATED.3IONS-SCNC: 19 MMOL/L (ref 7–16)
APAP SERPL-MCNC: <5 UG/ML (ref 10–30)
AST SERPL-CCNC: 21 U/L (ref 0–35)
BARBITURATES UR QL SCN: NEGATIVE
BASOPHILS # BLD: 0 K/UL (ref 0–0.2)
BASOPHILS NFR BLD: 0 % (ref 0–2)
BENZODIAZ UR QL: NEGATIVE
BILIRUB SERPL-MCNC: <0.2 MG/DL (ref 0–1.2)
BUN SERPL-MCNC: 14 MG/DL (ref 6–20)
BUPRENORPHINE UR QL: NEGATIVE
CALCIUM SERPL-MCNC: 9.3 MG/DL (ref 8.6–10)
CANNABINOIDS UR QL SCN: NEGATIVE
CHLORIDE SERPL-SCNC: 106 MMOL/L (ref 98–107)
CO2 SERPL-SCNC: 19 MMOL/L (ref 22–29)
COCAINE UR QL SCN: NEGATIVE
CREAT SERPL-MCNC: 0.8 MG/DL (ref 0.5–1)
EKG ATRIAL RATE: 86 BPM
EKG P AXIS: 67 DEGREES
EKG P-R INTERVAL: 160 MS
EKG Q-T INTERVAL: 406 MS
EKG QRS DURATION: 84 MS
EKG QTC CALCULATION (BAZETT): 485 MS
EKG R AXIS: 36 DEGREES
EKG T AXIS: 40 DEGREES
EKG VENTRICULAR RATE: 86 BPM
EOSINOPHIL # BLD: 0.07 K/UL (ref 0.05–0.5)
EOSINOPHILS RELATIVE PERCENT: 1 % (ref 0–6)
ERYTHROCYTE [DISTWIDTH] IN BLOOD BY AUTOMATED COUNT: 13.6 % (ref 11.5–15)
ETHANOLAMINE SERPL-MCNC: <10 MG/DL (ref 0–0.08)
FENTANYL UR QL: NEGATIVE
GFR, ESTIMATED: 87 ML/MIN/1.73M2
GLUCOSE SERPL-MCNC: 114 MG/DL (ref 74–99)
HCG, URINE, POC: NEGATIVE
HCT VFR BLD AUTO: 34.2 % (ref 34–48)
HGB BLD-MCNC: 11.4 G/DL (ref 11.5–15.5)
LACTATE BLDV-SCNC: 1.3 MMOL/L (ref 0.5–2.2)
LACTATE BLDV-SCNC: 7.2 MMOL/L (ref 0.5–2.2)
LYMPHOCYTES NFR BLD: 4.37 K/UL (ref 1.5–4)
LYMPHOCYTES RELATIVE PERCENT: 54 % (ref 20–42)
Lab: NORMAL
MAGNESIUM SERPL-MCNC: 2.1 MG/DL (ref 1.6–2.6)
MCH RBC QN AUTO: 29 PG (ref 26–35)
MCHC RBC AUTO-ENTMCNC: 33.3 G/DL (ref 32–34.5)
MCV RBC AUTO: 87 FL (ref 80–99.9)
METHADONE UR QL: NEGATIVE
MONOCYTES NFR BLD: 0.49 K/UL (ref 0.1–0.95)
MONOCYTES NFR BLD: 6 % (ref 2–12)
NEGATIVE QC PASS/FAIL: NORMAL
NEUTROPHILS NFR BLD: 39 % (ref 43–80)
NEUTS SEG NFR BLD: 3.17 K/UL (ref 1.8–7.3)
OPIATES UR QL SCN: NEGATIVE
OXYCODONE UR QL SCN: NEGATIVE
PCP UR QL SCN: NEGATIVE
PLATELET # BLD AUTO: 332 K/UL (ref 130–450)
PMV BLD AUTO: 9.1 FL (ref 7–12)
POSITIVE QC PASS/FAIL: NORMAL
POTASSIUM SERPL-SCNC: 3.5 MMOL/L (ref 3.5–5.1)
PROT SERPL-MCNC: 7.4 G/DL (ref 6.4–8.3)
RBC # BLD AUTO: 3.93 M/UL (ref 3.5–5.5)
RBC # BLD: ABNORMAL 10*6/UL
SALICYLATES SERPL-MCNC: <0.5 MG/DL (ref 0–30)
SODIUM SERPL-SCNC: 143 MMOL/L (ref 136–145)
TEST INFORMATION: NORMAL
TOXIC TRICYCLIC SC,BLOOD: NEGATIVE
TROPONIN I SERPL HS-MCNC: <6 NG/L (ref 0–14)
WBC OTHER # BLD: 8.1 K/UL (ref 4.5–11.5)

## 2025-07-05 PROCEDURE — 99285 EMERGENCY DEPT VISIT HI MDM: CPT

## 2025-07-05 PROCEDURE — 96374 THER/PROPH/DIAG INJ IV PUSH: CPT

## 2025-07-05 PROCEDURE — 84484 ASSAY OF TROPONIN QUANT: CPT

## 2025-07-05 PROCEDURE — 96375 TX/PRO/DX INJ NEW DRUG ADDON: CPT

## 2025-07-05 PROCEDURE — 2580000003 HC RX 258

## 2025-07-05 PROCEDURE — 80179 DRUG ASSAY SALICYLATE: CPT

## 2025-07-05 PROCEDURE — 6370000000 HC RX 637 (ALT 250 FOR IP)

## 2025-07-05 PROCEDURE — 70450 CT HEAD/BRAIN W/O DYE: CPT

## 2025-07-05 PROCEDURE — 96376 TX/PRO/DX INJ SAME DRUG ADON: CPT

## 2025-07-05 PROCEDURE — G0480 DRUG TEST DEF 1-7 CLASSES: HCPCS

## 2025-07-05 PROCEDURE — 80307 DRUG TEST PRSMV CHEM ANLYZR: CPT

## 2025-07-05 PROCEDURE — 6360000002 HC RX W HCPCS

## 2025-07-05 PROCEDURE — 80143 DRUG ASSAY ACETAMINOPHEN: CPT

## 2025-07-05 PROCEDURE — 85025 COMPLETE CBC W/AUTO DIFF WBC: CPT

## 2025-07-05 PROCEDURE — 80053 COMPREHEN METABOLIC PANEL: CPT

## 2025-07-05 PROCEDURE — 93010 ELECTROCARDIOGRAM REPORT: CPT | Performed by: INTERNAL MEDICINE

## 2025-07-05 PROCEDURE — 93005 ELECTROCARDIOGRAM TRACING: CPT

## 2025-07-05 PROCEDURE — 83735 ASSAY OF MAGNESIUM: CPT

## 2025-07-05 PROCEDURE — 83605 ASSAY OF LACTIC ACID: CPT

## 2025-07-05 PROCEDURE — 6360000002 HC RX W HCPCS: Performed by: STUDENT IN AN ORGANIZED HEALTH CARE EDUCATION/TRAINING PROGRAM

## 2025-07-05 RX ORDER — FENTANYL CITRATE 50 UG/ML
INJECTION, SOLUTION INTRAMUSCULAR; INTRAVENOUS
Status: COMPLETED
Start: 2025-07-05 | End: 2025-07-05

## 2025-07-05 RX ORDER — LORAZEPAM 1 MG/1
1 TABLET ORAL ONCE
Status: COMPLETED | OUTPATIENT
Start: 2025-07-05 | End: 2025-07-05

## 2025-07-05 RX ORDER — MIDAZOLAM HYDROCHLORIDE 1 MG/ML
INJECTION, SOLUTION INTRAMUSCULAR; INTRAVENOUS
Status: COMPLETED
Start: 2025-07-05 | End: 2025-07-05

## 2025-07-05 RX ORDER — 0.9 % SODIUM CHLORIDE 0.9 %
1000 INTRAVENOUS SOLUTION INTRAVENOUS ONCE
Status: COMPLETED | OUTPATIENT
Start: 2025-07-05 | End: 2025-07-05

## 2025-07-05 RX ORDER — LEVETIRACETAM 500 MG/5ML
1500 INJECTION, SOLUTION, CONCENTRATE INTRAVENOUS ONCE
Status: COMPLETED | OUTPATIENT
Start: 2025-07-05 | End: 2025-07-05

## 2025-07-05 RX ORDER — FENTANYL CITRATE 50 UG/ML
50 INJECTION, SOLUTION INTRAMUSCULAR; INTRAVENOUS ONCE
Status: COMPLETED | OUTPATIENT
Start: 2025-07-05 | End: 2025-07-05

## 2025-07-05 RX ORDER — LEVETIRACETAM 500 MG/5ML
INJECTION, SOLUTION, CONCENTRATE INTRAVENOUS
Status: COMPLETED
Start: 2025-07-05 | End: 2025-07-05

## 2025-07-05 RX ORDER — MIDAZOLAM HYDROCHLORIDE 2 MG/2ML
2 INJECTION, SOLUTION INTRAMUSCULAR; INTRAVENOUS ONCE
Status: DISCONTINUED | OUTPATIENT
Start: 2025-07-05 | End: 2025-07-05

## 2025-07-05 RX ADMIN — MIDAZOLAM HYDROCHLORIDE 2 MG: 1 INJECTION, SOLUTION INTRAMUSCULAR; INTRAVENOUS at 13:52

## 2025-07-05 RX ADMIN — SODIUM CHLORIDE 1000 ML: 0.9 INJECTION, SOLUTION INTRAVENOUS at 11:52

## 2025-07-05 RX ADMIN — LORAZEPAM 1 MG: 1 TABLET ORAL at 11:29

## 2025-07-05 RX ADMIN — FENTANYL CITRATE 50 MCG: 50 INJECTION INTRAMUSCULAR; INTRAVENOUS at 21:38

## 2025-07-05 RX ADMIN — LEVETIRACETAM 1500 MG: 500 INJECTION, SOLUTION, CONCENTRATE INTRAVENOUS at 13:52

## 2025-07-05 RX ADMIN — LEVETIRACETAM 1500 MG: 100 INJECTION INTRAVENOUS at 13:52

## 2025-07-05 RX ADMIN — FENTANYL CITRATE 50 MCG: 50 INJECTION INTRAMUSCULAR; INTRAVENOUS at 12:10

## 2025-07-05 RX ADMIN — FENTANYL CITRATE 50 MCG: 50 INJECTION, SOLUTION INTRAMUSCULAR; INTRAVENOUS at 12:10

## 2025-07-05 RX ADMIN — MIDAZOLAM HYDROCHLORIDE 2 MG: 2 INJECTION, SOLUTION INTRAMUSCULAR; INTRAVENOUS at 13:52

## 2025-07-05 ASSESSMENT — LIFESTYLE VARIABLES
HOW MANY STANDARD DRINKS CONTAINING ALCOHOL DO YOU HAVE ON A TYPICAL DAY: PATIENT DOES NOT DRINK
HOW OFTEN DO YOU HAVE A DRINK CONTAINING ALCOHOL: NEVER

## 2025-07-05 ASSESSMENT — PAIN DESCRIPTION - LOCATION: LOCATION: HEAD

## 2025-07-05 ASSESSMENT — PAIN DESCRIPTION - DESCRIPTORS: DESCRIPTORS: THROBBING

## 2025-07-05 ASSESSMENT — PAIN DESCRIPTION - ORIENTATION: ORIENTATION: ANTERIOR

## 2025-07-09 ENCOUNTER — CARE COORDINATION (OUTPATIENT)
Dept: CARE COORDINATION | Age: 45
End: 2025-07-09

## 2025-07-09 NOTE — CARE COORDINATION
Care Transitions Note    Initial Call - Call within 2 business days of discharge: Yes    Attempted to reach patient for transitions of care follow up. Unable to reach patient.    Outreach Attempts:   HIPAA compliant voicemail left for patient.     Patient: Wilmer De La Vega    Patient : 1980   MRN: [id number]    Reason for Admission: intracranial mass and seizure-like acitvity  Discharge Date: 25  RURS: No data recorded  Last Discharge Facility       Date Complaint Diagnosis Description Type Department Provider    25 Seizures Intracranial mass ... ED (TRANSFER) O[facility], MD            Was this an external facility discharge? Yes. Discharge Date: 25. Facility Name: [facility]    Attempted to contact patient today 25 for LANI/hospital discharge follow up. Left message on home/mobile number requesting a return call back to CTN and provided contact information. CTN will continue with patient outreach.     CARYL Sevilla

## 2025-07-09 NOTE — ED PROVIDER NOTES
[facility] EMERGENCY DEPARTMENT  EMERGENCY DEPARTMENT ENCOUNTER        Pt Name: Wilmer De La Vega  MRN: [id number]  Birthdate 1980  Date of evaluation: 7/5/2025  Provider: Abdoulaye Mercado II, DO  PCP: Edith Hinojosa MD  Note Started: 9:53 AM EDT 7/9/25    CHIEF COMPLAINT       Chief Complaint   Patient presents with    Seizures     Pt had active seizure in car on way to hospital, has a brain mass and supposed to have surgery at [place] on July 29th        HISTORY OF PRESENT ILLNESS: 1 or more Elements            Wilmer De La Vega is a 45 y.o. female for history of seizure-like disorder, no intracranial mass, brought in by EMS from home for seizure-like activity.  Patient had witnessed episode of generalized seizure-like activity by family, had 2 separate episodes of same seizure-like activity with EMS.  Patient did not appear to be postictal on EMS arrival.  Patient states that she has been worked up for seizure-like activity in the past, follows with neurosurgery at [facility], scheduled for mass removal in several weeks.  Patient not currently on any antiepileptic medication.  Denies any nausea or vomiting, no recent falls or head injuries.  She is not anticoagulated.     Nursing Notes were all reviewed and agreed with or any disagreements were addressed in the HPI.      REVIEW OF EXTERNAL NOTE :       Records reviewed for medical hx, surgical, hx, and medication lists      Chart Review/External Note Review    Last Echo reviewed by Me:  No results found for: \"LVEF\", \"LVEFMODE\"          Controlled Substance Monitoring:    Acute and Chronic Pain Monitoring:   RX Monitoring Periodic Controlled Substance Monitoring   11/16/2022   1:45 PM Possible medication side effects, risk of tolerance/dependence & alternative treatments discussed.;No signs of potential drug abuse or diversion identified.           REVIEW OF SYSTEMS :      Positives and Pertinent negatives as per HPI. 
        [facility] EMERGENCY DEPARTMENT  EMERGENCY DEPARTMENT ENCOUNTER        Pt Name: Wilmer De La Vega  MRN: [id number]  Birthdate 1980  Date of evaluation: 7/5/2025  Provider: Abdoulaye Mercado II, DO  PCP: Edith Hinojosa MD  Note Started: 10:45 AM EDT 7/5/25    CHIEF COMPLAINT       No chief complaint on file.      HISTORY OF PRESENT ILLNESS: 1 or more Elements        {Limitations to history (Optional):78333}    Wilmer De La Vega is a 45 y.o. female for history of seizure-like disorder, no intracranial mass, brought in by EMS from home for seizure-like activity.  Patient had witnessed episode of generalized seizure-like activity by family, had 2 separate episodes of same seizure-like activity with EMS.  Patient did not appear to be postictal on EMS arrival.  Patient states that she has been worked up for seizure-like activity in the past, follows with neurosurgery at [facility], scheduled for mass removal in several weeks.  Patient not currently on any antiepileptic medication.  Denies any nausea or vomiting, no recent falls or head injuries.  She is not anticoagulated.    Nursing Notes were all reviewed and agreed with or any disagreements were addressed in the HPI.      REVIEW OF EXTERNAL NOTE :       Records reviewed for medical hx, surgical, hx, and medication lists      Chart Review/External Note Review    Last Echo reviewed by Me:  No results found for: \"LVEF\", \"LVEFMODE\"          Controlled Substance Monitoring:    Acute and Chronic Pain Monitoring:   RX Monitoring Periodic Controlled Substance Monitoring   11/16/2022   1:45 PM Possible medication side effects, risk of tolerance/dependence & alternative treatments discussed.;No signs of potential drug abuse or diversion identified.           REVIEW OF SYSTEMS :      Positives and Pertinent negatives as per HPI.     SURGICAL HISTORY     Past Surgical History:   Procedure Laterality Date   • BREAST LUMPECTOMY Bilateral    • 
PM      PATIENT REFERRED TO:  No follow-up provider specified.    DISCHARGE MEDICATIONS:  New Prescriptions    No medications on file       DISCONTINUED MEDICATIONS:  Discontinued Medications    No medications on file              (Please note that portions of this note were completed with a voice recognition program.  Efforts were made to edit the dictations but occasionally words are mis-transcribed.)    Abdoulaye Mercado II, DO (electronically signed)

## 2025-07-10 ENCOUNTER — CARE COORDINATION (OUTPATIENT)
Dept: CARE COORDINATION | Age: 45
End: 2025-07-10

## 2025-07-10 DIAGNOSIS — F44.5 PSYCHOGENIC NONEPILEPTIC SEIZURE: Primary | ICD-10-CM

## 2025-07-10 PROCEDURE — 1111F DSCHRG MED/CURRENT MED MERGE: CPT | Performed by: FAMILY MEDICINE

## 2025-07-10 RX ORDER — HYDROCORTISONE 10 MG/1
10 TABLET ORAL DAILY
COMMUNITY

## 2025-07-10 RX ORDER — HYDROXYZINE PAMOATE 25 MG/1
25 CAPSULE ORAL 4 TIMES DAILY PRN
COMMUNITY

## 2025-07-10 RX ORDER — HYDROCORTISONE 5 MG/1
5 TABLET ORAL DAILY
COMMUNITY

## 2025-07-10 RX ORDER — LEVOTHYROXINE SODIUM 50 UG/1
50 TABLET ORAL DAILY
COMMUNITY

## 2025-07-10 NOTE — CARE COORDINATION
Care Transitions Note    Initial Call - Call within 2 business days of discharge: Yes    Attempted to reach patient for transitions of care follow up. Unable to reach patient.    Outreach Attempts:   Multiple attempts to contact patient at phone numbers on file.     Patient: Wilmer De La Vega    Patient : 1980   MRN: [id number]    Reason for Admission: Intracranial mass and seizure-like acivity  Discharge Date: 25  RURS: No data recorded  Last Discharge Facility       Date Complaint Diagnosis Description Type Department Provider    25 Seizures Intracranial mass ... ED (TRANSFER) O[facility]iel Archer ED, MD            Was this an external facility discharge? Yes. Discharge Date: 2025. Facility Name: [facility]    Second attempt made today 7/10/25 to contact patient for TCM/hospital discharge follow up. Left message on home/mobile number requesting a return call back to CTN and provided contact information. CTN signing off if no return call.     CARYL Sevilla

## 2025-07-10 NOTE — CARE COORDINATION
Care Transitions Note    Initial Call - Call within 2 business days of discharge: Yes    Patient Current Location:  [place]    Care Transition Nurse contacted the patient by telephone to perform post hospital discharge assessment, verified name and  as identifiers.  Provided introduction to self, and explanation of the Care Transition Nurse role.    Patient: Wilmer De La Vega    Patient : 1980   MRN: [id number]    Reason for Admission: Intracranial mass/seizure-like activity  Discharge Date: 25  RURS: No data recorded    Last Discharge Facility       Date Complaint Diagnosis Description Type Department Provider    25 Seizures Intracranial mass ... ED (TRANSFER) [facility] Otoniel Pichardo MD            Was this an external facility discharge? Yes. Discharge Date: 25. Facility Name: [facility]    Additional needs identified to be addressed with provider   No needs identified             Method of communication with provider: none.    Patients top risk factors for readmission: lack of knowledge about disease, level of motivation, and polypharmacy    Interventions to address risk factors:   Education: Advised to seek immediate medical attention for any seizures or seizure-like activity    Care Summary Note: Spoke with patient today 7/10/25 for initial LANI/hospital discharge follow up. Confirmed patient was seen at [facility] on 25 for intracranial mass/seizure-like activity and transferred to [facility] and subsequently discharged on 25.     Patient states she is feeling well since hosp d/c and offers no complaints. Denies any seizures or seizure-like activity. Denies any shortness of breath, chest pain or chest discomfort. Denies any nausea, vomiting, chills or fever.     Patient denies any headaches, visual changes or confusion/AMS. Patient states she is ambulating independently.     CTN spoke with Sugar with Dr. Almanzar (neurosurgery) at [facility] who

## 2025-07-10 NOTE — CARE COORDINATION
Received a call back from Gissel with [facility] office who advises patient is scheduled for TCM/HFU appt with Mc Hinojosa (PCP) on 7/16/25 at 2 pm. CTN called patient back with update and she v/u. Patient states she is satying with DTR in [place] and will have transportation to appt. Patient denies any further questions or needs. CTN will continue to follow.

## 2025-07-15 SDOH — ECONOMIC STABILITY: FOOD INSECURITY: WITHIN THE PAST 12 MONTHS, THE FOOD YOU BOUGHT JUST DIDN'T LAST AND YOU DIDN'T HAVE MONEY TO GET MORE.: SOMETIMES TRUE

## 2025-07-15 SDOH — ECONOMIC STABILITY: FOOD INSECURITY: WITHIN THE PAST 12 MONTHS, YOU WORRIED THAT YOUR FOOD WOULD RUN OUT BEFORE YOU GOT MONEY TO BUY MORE.: NEVER TRUE

## 2025-07-15 SDOH — ECONOMIC STABILITY: INCOME INSECURITY: IN THE LAST 12 MONTHS, WAS THERE A TIME WHEN YOU WERE NOT ABLE TO PAY THE MORTGAGE OR RENT ON TIME?: YES

## 2025-07-15 SDOH — ECONOMIC STABILITY: TRANSPORTATION INSECURITY
IN THE PAST 12 MONTHS, HAS LACK OF TRANSPORTATION KEPT YOU FROM MEETINGS, WORK, OR FROM GETTING THINGS NEEDED FOR DAILY LIVING?: NO

## 2025-07-15 SDOH — ECONOMIC STABILITY: TRANSPORTATION INSECURITY
IN THE PAST 12 MONTHS, HAS THE LACK OF TRANSPORTATION KEPT YOU FROM MEDICAL APPOINTMENTS OR FROM GETTING MEDICATIONS?: NO

## 2025-07-16 ENCOUNTER — OFFICE VISIT (OUTPATIENT)
Dept: PRIMARY CARE CLINIC | Age: 45
End: 2025-07-16

## 2025-07-16 VITALS
HEIGHT: 67 IN | RESPIRATION RATE: 18 BRPM | OXYGEN SATURATION: 97 % | SYSTOLIC BLOOD PRESSURE: 94 MMHG | HEART RATE: 68 BPM | BODY MASS INDEX: 30.61 KG/M2 | DIASTOLIC BLOOD PRESSURE: 60 MMHG | TEMPERATURE: 96.9 F | WEIGHT: 195 LBS

## 2025-07-16 DIAGNOSIS — D35.2 PITUITARY MICROADENOMA (HCC): ICD-10-CM

## 2025-07-16 DIAGNOSIS — R55 SYNCOPE AND COLLAPSE: ICD-10-CM

## 2025-07-16 DIAGNOSIS — R00.0 RACING HEART BEAT: Primary | ICD-10-CM

## 2025-07-16 DIAGNOSIS — R07.9 CHEST PAIN, UNSPECIFIED TYPE: ICD-10-CM

## 2025-07-16 DIAGNOSIS — Z09 HOSPITAL DISCHARGE FOLLOW-UP: ICD-10-CM

## 2025-07-16 DIAGNOSIS — F44.5 PSYCHOGENIC NONEPILEPTIC SEIZURE: ICD-10-CM

## 2025-07-16 RX ORDER — HYDROCORTISONE 5 MG/1
5 TABLET ORAL DAILY
Qty: 90 TABLET | Refills: 1 | Status: SHIPPED | OUTPATIENT
Start: 2025-07-16

## 2025-07-16 RX ORDER — LEVOTHYROXINE SODIUM 50 UG/1
50 TABLET ORAL DAILY
Qty: 90 TABLET | Refills: 1 | Status: CANCELLED | OUTPATIENT
Start: 2025-07-16

## 2025-07-16 RX ORDER — HYDROCORTISONE 10 MG/1
10 TABLET ORAL DAILY
Qty: 90 TABLET | Refills: 1 | Status: SHIPPED | OUTPATIENT
Start: 2025-07-16

## 2025-07-16 RX ORDER — HYDROXYZINE PAMOATE 25 MG/1
25 CAPSULE ORAL EVERY 6 HOURS
Qty: 180 CAPSULE | Refills: 1
Start: 2025-07-16

## 2025-07-16 ASSESSMENT — PATIENT HEALTH QUESTIONNAIRE - PHQ9
10. IF YOU CHECKED OFF ANY PROBLEMS, HOW DIFFICULT HAVE THESE PROBLEMS MADE IT FOR YOU TO DO YOUR WORK, TAKE CARE OF THINGS AT HOME, OR GET ALONG WITH OTHER PEOPLE: NOT DIFFICULT AT ALL
3. TROUBLE FALLING OR STAYING ASLEEP: NOT AT ALL
2. FEELING DOWN, DEPRESSED OR HOPELESS: SEVERAL DAYS
5. POOR APPETITE OR OVEREATING: NOT AT ALL
SUM OF ALL RESPONSES TO PHQ QUESTIONS 1-9: 4
1. LITTLE INTEREST OR PLEASURE IN DOING THINGS: SEVERAL DAYS
6. FEELING BAD ABOUT YOURSELF - OR THAT YOU ARE A FAILURE OR HAVE LET YOURSELF OR YOUR FAMILY DOWN: SEVERAL DAYS
9. THOUGHTS THAT YOU WOULD BE BETTER OFF DEAD, OR OF HURTING YOURSELF: NOT AT ALL
4. FEELING TIRED OR HAVING LITTLE ENERGY: SEVERAL DAYS
SUM OF ALL RESPONSES TO PHQ QUESTIONS 1-9: 4
SUM OF ALL RESPONSES TO PHQ QUESTIONS 1-9: 4
8. MOVING OR SPEAKING SO SLOWLY THAT OTHER PEOPLE COULD HAVE NOTICED. OR THE OPPOSITE, BEING SO FIGETY OR RESTLESS THAT YOU HAVE BEEN MOVING AROUND A LOT MORE THAN USUAL: NOT AT ALL
7. TROUBLE CONCENTRATING ON THINGS, SUCH AS READING THE NEWSPAPER OR WATCHING TELEVISION: NOT AT ALL
SUM OF ALL RESPONSES TO PHQ QUESTIONS 1-9: 4

## 2025-07-16 NOTE — PROGRESS NOTES
Post-Discharge Transitional Care  Follow Up      Wilmer De La Vega   YOB: 1980    Date of Office Visit:  7/16/2025  Date of Hospital Admission: 7/5/25  Date of Hospital Discharge: 7/6/25  Risk of hospital readmission (high >=14%. Medium >=10%) :No data recorded    Care management risk score Rising risk (score 2-5) and Complex Care (Scores >=6): No Risk Score On File     Non face to face  following discharge, date last encounter closed (first attempt may have been earlier): 07/10/2025    Call initiated 2 business days of discharge: Yes    ASSESSMENT/PLAN:   Racing heart beat  -     Cardiac event/MCOT monitor; Future  Further recommendations pending results    Hospital discharge follow-up  -     WI DISCHARGE MEDS RECONCILED W/ CURRENT OUTPATIENT MED LIST  Pt's home and hospital medication lists were reconciled in full today and updated in the chart as necessary.    Chest pain, unspecified type  -     Cardiac event/MCOT monitor; Future  Syncope and collapse  -     Cardiac event/MCOT monitor; Future    Pseudoseizure  Will encourage follow-up with psychiatry moving forward if patient amenable.    Pituitary microadenoma (HCC)  Care managers are already following the patient.  We will ask their assistance in figuring out when the patient is due for follow-up with neurosurgery.    Medical Decision Making: Moderate complexity  Return in about 2 months (around 9/16/2025).           Subjective:   HPI:  Follow up of Hospital problems/diagnosis(es): PNES, pituitary microadenoma     Inpatient course: Discharge summary reviewed- see chart.  Patient states that she presented to the emergency room for rapid heart rate and chest pain.  At some point during transport, she blanked out.  She considers this a seizure.  She woke up in the emergency room.  She states that she was told at 1 point she lost some oxygen, and that she would need a heart monitor moving forward.    Per hospital discharge paperwork, the patient

## 2025-07-22 ENCOUNTER — CARE COORDINATION (OUTPATIENT)
Dept: CARE COORDINATION | Age: 45
End: 2025-07-22

## 2025-07-22 NOTE — CARE COORDINATION
Care Transitions Note    Follow Up Call     Attempted to reach patient for transitions of care follow up.  Unable to reach patient.      Outreach Attempts:   HIPAA compliant voicemail left for patient.     Care Summary Note: Attempted to contact patient today 7/22/25 for LANI/hospital discharge follow up sub call. Left message on home/mobile number requesting a return call back to CTN and provided contact information.     Noted patient completed HFU appt with Dr. EVGENY Hinojosa (PCP) on 7/16/25. CTN will continue to [facility].    Follow Up Appointment:   Future Appointments         Provider Specialty Dept Phone    9/17/2025 2:45 PM Edith Hinojosa MD Primary Care [phone]            Plan for follow-up call in 6-10 days based on severity of symptoms and risk factors. Plan for next call: Symptom check    CARYL Sevilla

## 2025-07-23 ENCOUNTER — CARE COORDINATION (OUTPATIENT)
Dept: CARE COORDINATION | Age: 45
End: 2025-07-23

## 2025-07-23 NOTE — CARE COORDINATION
Care Transitions Note    Follow Up Call     Patient Current Location:  [place]    Care Transition Nurse contacted the patient by telephone. Verified name and  as identifiers.    Additional needs identified to be addressed with provider   No needs identified                 Method of communication with provider: none.    Care Summary Note: Spoke with patient today 25 for LANI/hospital discharge follow up sub call for seizure-like activity as she was recently discharged from [facility] on 25. Patient denies any seizures or seizure-like activity. Denies any headaches, confusion/AMS or visual changes. Denies any difficulty ambulating.     Again, reviewed with patient that CTN spoke with Dr. Lisa (neuro surgery) office that surgery on 25 has been canceled d/t wanting patient to be seizure free for 2 months before moving forward with surgery (benign pituitary mass). Patient is aware of f/u appt with Dr. Lisa on 25 at 11 am.     Patient states having \"racing\" heart but no chest pain or shortness of breath and awaiting to get set up with cardiac event monitor per PCP.     CTN confirmed with patient she completed HFU appt with Dr. EVGENY Hinojosa (PCP) on 25 who stopped Levothyroxine and started on Cortef twice daily (am and 3 pm).    Patient states she is currently in [facility] as she has family locally but lives with DTR in [place]. Patient denies any needs or concerns at this time. CTN will continue to follow for Care Transition.     Plan of care updates since last contact:  Education: Reviewed s/s of seizure and advised to seek immediate medical attention for any seizures or seizure like activity       Advance Care Planning:   Does patient have an Advance Directive: reviewed and current.    Medication Review:  Medications changed since last call, reviewed today.     Remote Patient Monitoring:  Offered patient enrollment in the Remote Patient Monitoring (RPM) program for

## 2025-07-31 ENCOUNTER — CARE COORDINATION (OUTPATIENT)
Dept: CARE COORDINATION | Age: 45
End: 2025-07-31

## 2025-07-31 NOTE — CARE COORDINATION
Care Transitions Note    Follow Up Call     Attempted to reach patient for transitions of care follow up.  Unable to reach patient.      Outreach Attempts:   HIPAA compliant voicemail left for patient.     Care Summary Note: LANI follow-up call attempted. UTR.     Follow Up Appointment:   Future Appointments         Provider Specialty Dept Phone    9/17/2025 2:45 PM Edith Hinojosa MD Primary Care [phone]            Plan for follow-up call in 6-10 days based on severity of symptoms and risk factors. Plan for next call: symptom management-any further s/s of seizure-like activity? How is HR and racing heart? Any confusion or headaches?  self management-any need for cardiac monitor? No PCP visit made.     Izabella Mckeon RN